# Patient Record
Sex: MALE | Race: WHITE | Employment: OTHER | URBAN - METROPOLITAN AREA
[De-identification: names, ages, dates, MRNs, and addresses within clinical notes are randomized per-mention and may not be internally consistent; named-entity substitution may affect disease eponyms.]

---

## 2020-05-29 LAB
EKG P AXIS: NORMAL DEG
EKG P-R INTERVAL: NORMAL MS
EKG QRS INTERVAL: 72 MS
EKG QTC INTERVAL: NORMAL MS
HEART RATE: 65 BPM

## 2021-07-06 LAB
EKG P AXIS: NORMAL DEG
EKG P-R INTERVAL: NORMAL MS
EKG QRS INTERVAL: 80 MS
EKG QTC INTERVAL: NORMAL MS
HEART RATE: 73 BPM

## 2021-09-22 NOTE — PROGRESS NOTES
[address] [facility]  [address] [place] [address]  Dept: [phone]  Loc: [phone]   Hematology/Oncology Consult (Clinic)        9/232/21     Michelle Logan   1940     Ricardo Coronado MD [facility]  Shay Bergeron DO PCP is in [facility] [place]. 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 Patient cancelled heme appt  due on 9/8 as he wanted all care closer to home. Otolaryngology care per Dr Ruthy Friend at [facility]. Patient had  Covid on 8/26, outpt mnt and recovered. April noted lump L neck under the jaw. Since core bx has been done, he states the mass is much smaller. Since April ,legs a bit weaker bilaterally but no pain except in knees. Overall describes a sense of wellbeing is stable and unchanged. Denies any drenching sweats fevers or weight loss. Denies any midline back pain or pain in his upper arms or legs. Denies any frequent or severe infections. He had Covid vaccination done in April. ROS:  Review of Systems 14 point negative except as detailed above. PMH:   Past Medical History:   Diagnosis Date    CAD (coronary artery disease)     Enlarged lymph nodes     Heart disease     Hyperlipidemia     Hypertension     Stents x 2 2004. No MI,angina ,chf  Denies history of diabetes or stroke.     Social HX:   Social History     Socioeconomic History    Marital status:      Spouse name: Not on file    Number of children: Not on file    Years of education: Not on file    Highest education level: Not on file   Occupational History    Not on file   Tobacco Use    Smoking status: Former Smoker     Packs/day: 1.00     Years: 37.00     Pack years: 37.00     Types: Never done    Flu vaccine (1) Never done   [employer] Visit (AWV)  Never done        Interests:   Not reviewed    Fam HX:   No Cancer hx    Hospitalizations:   None recent    Allergies:  No Known Allergies     Adult Illness: There is no problem list on file for this patient. see above    Surgery:  Past Surgical History:   Procedure Laterality Date    ABSCESS DRAINAGE      SPIDER BITE    CAROTID STENT PLACEMENT      COLONOSCOPY      HERNIA REPAIR  2016        Medications:  Current Outpatient Medications   Medication Sig Dispense Refill    OMEPRAZOLE PO Take by mouth      cetirizine (ZYRTEC) 10 MG tablet Take 10 mg by mouth daily      Multiple Vitamins-Minerals (THERAPEUTIC MULTIVITAMIN-MINERALS) tablet Take 1 tablet by mouth daily      pravastatin (PRAVACHOL) 80 MG tablet Take 80 mg by mouth daily.  metoprolol (LOPRESSOR) 25 MG tablet Take 25 mg by mouth 2 times daily.  lisinopril (PRINIVIL;ZESTRIL) 5 MG tablet Take 5 mg by mouth daily.  clopidogrel (PLAVIX) 75 MG tablet Take 75 mg by mouth daily.  aspirin 81 MG tablet Take 81 mg by mouth daily. No current facility-administered medications for this visit. EXAM:    BP (!) 169/76 (Site: Right Upper Arm, Position: Sitting, Cuff Size: Medium Adult)   Pulse 78   Temp 98.5 °F (36.9 °C) (Oral)   Resp 16   Ht 5' 10\" (1.778 m)   Wt 206 lb 8 oz (93.7 kg)   SpO2 95%   BMI 29.63 kg/m²      ECO  General: Non-ill appearing. Appears younger than his stated age. HEENT: NC/AT,nonicteric, perrla,eom intact, no mucosal lesions, mildly hard of hearing. Neck: normal thyroid, no masses. pulses nl, no bruits,   Nodes: Left neck at the site of the core biopsy of the left mid mandible shows a vague fullness persisting. Inferior to this is another area of vague fullness consistent with adenopathy. Also some prominence in the preauricular area. No other areas of adenopathy.   Lungs/chest: clear, no rales,rhonchi or wheezing, lung bases clear  CV: rrr, no rubs ,gallops or murmurs  Breasts: normal exam  Abd/Rectal: soft, non-tender,bowel sounds normal , no HSM,no masses  Back: normal curvature, No midline tenderness. flanks nontender  : Not Examined  Extremities: no cyanosis,clubbing or edema. Skin: unremarkable  Neuro: A and O x 4, CN exam nonfocal, Motor- no deficits, Sensory- no deficits, gait-nl, speech- fluent, no ataxia. Devices: none      DATA:    LAB:     None found. To be ordered today. IMAGING:  None thus far. PROCEDURES:  Lymph node bx [facility] 8/24/21  Case Report   Surgical Pathology Report                         Case: [id number]                                   Authorizing Provider: Connie Garcia MD              Collected:           08/24/2021 03:19 PM           Ordering Location:     [facility]               Received:            08/24/2021 04:27 PM           Pathologist:           Bishop Britton MD                                                             Specimens:   A) - SURG PATH, left neck level II LN for lymphoma work up                                          B) - SURG PATH, left neck level II LN                                                       Clinical History      Preop Diagnosis:  Please perform core biopsy of left level II lymph node - will need to send tissue for flow for workup for possible lymphoma. Associated Diagnosis:  Follow-up, Z09. Pathologic Diagnosis      A. Lymph node, left neck level II, biopsy:  · Plasma cell neoplasm, see comment.         Electronically signed by Bishop Britton MD on 8/27/2021 at 11:54 AM    Diagnosis Comments      The biopsy shows effaced lymph node with infiltrations of sheets of mature plasma cells admixed with small lymphocytes. Touch print shows similar morphology. The plasma cells are positive for TzOH52v,  and is Kappa+ light chain restricted (Luc Szymanski). They are negative for CD5, Cyclin D1, Cd20, Cd117, and Lambda-WOJCICEH. The background small lymphocytes are positive for T cells (30%), and a fewer B-cells (10%). The Ki67 show approx. 10-20% positivity and most are those reactive T-cells. The findings are consistent with Plasmacytoma. A differential diagnosis of lymphoplasmacytic lymphoma or marginal zone lymphoma is considered but given the background of few B-cells these findings are unlikely. Correlation with IgH and molecular study is recommended. Correlation with systemic examination and serum test is recommended to rule out multiple myeloma.      All controls show appropriate reactivity.     All immunohistochemistry, in situ hybridization, and histochemical tests were developed by and are performed at the [address] [place] [address], [address], [address] [place] [address]. All tests reported here, except those addressing HER2 overexpression as a predictive marker, have not been cleared by or approved by the [employer] and Drug Administration (FDA). The laboratory is regulated under CLIA as qualified to perform high-complexity testing. The tests are used for clinical purposes. They should not be regarded as investigational or for research.          Addendum      IHC for CD3 highlights T-cells similar to those of Cd5. MUM1 are positive on the plasma cells similar to those of Cd138. The Lambda-WOJCIECH show few plasma cells and K:L ratio>20:1. The findings confirm the final diagnosis.      All controls show appropriate reactivity.     All immunohistochemistry, in situ hybridization, and histochemical tests were developed by and are performed at the [address] [place] [address], [address]. All tests reported here, except those addressing HER2 overexpression as a predictive marker, have not been cleared by or approved by the [employer] and Drug Administration (FDA). The laboratory is regulated under CLIA as qualified to perform high-complexity testing.  The tests are used for clinical purposes. They should not be regarded as investigational or for research. Addendum electronically signed by Enrique Barlow MD on 8/31/2021 at  9:28 AM    Addendum 2      IgH PCR shows predominant monoclonal patterns, consistent with the presence of a clonalm B-cell or plasma cell population. The final diagnosis with differential diagnosis remains unchanged. Addendum electronically signed by Enrique Barlow MD on 9/2/2021 at  8:12 AM    Microscopic Description      A microscopic examination was performed. Synoptic report  Specimen Site: Lymph node, left neck level II  Procedure: Biopsies  Diagnosis:  Plasma cell neoplasm, see comment.     Microscopic description: The H&E section reveal a completely effaced lymphoid tissue with diffuse infiltrate with predominantly plasma cells admixed with small lymphoid cells. There are no large atypical lymphocytes present.      --Ancillary Studies--  Flow cytometry: Not performed  Molecular studies: Submitted for IgH PCR and result will be followed in the addendum. Special stains: Not performed  Immunohistochemistry (IHC)/in situ hybridization (WOJCIECH):     Neoplastic cells are positive for: CD19, , and Kappa WOJCIECH and plasma cell antigen (Vs38C). Neoplastic cells are negative for: CD5, CD10, CD20, PAX5, BCL1, Lambda WOJCIECH, and Ki67 (10% by manual quantification method)  Other IHC findings: see comment. IHC for Kappa and Lambda is unremarkable on both B and plasma cells. CD10 is virtually negative on the biopsy. CD20 and PAX5 show a few B-cell clusters (<10%) and they are negative for Cd5, Cd10, and BCL1.       HEME Mutation Panel  Inactivating and splice site TET2 mutations detected. No MYD88, CARD11, CXCR4, KLF2, NOTCH1, NOTCH2 or any other mutations associated wtih specific lymphoma types are noted.     en cleared by or approved by the [employer] and Drug Administration (FDA).  The laboratory is regulated under CLIA as qualified to perform high-complexity testing. The tests are used for clinical purposes. They should not be regarded as investigational or for research. Images          For Immediate Release to Patient's [employer]? Yes  NoAbnormal         [address]   Lab and Collection        PATHOLOGY:   Clinical History 7/22/21      Mass left parotid. *CYTOLOGIC DIAGNOSIS*      A. LEFT PAROTID MASS, FNA (CYTOLOGY AND CELL BLOCK):      FINAL DIAGNOSIS:  · Non Diagnostic  · Normal Salivary Gland Elements Only        FNA Performed By:  Clinician       Electronically signed by Rasta Dempsey MD on 7/23/2021 at  4:57 PM          GENETICS:  See above    MOLECULAR:  See above    ASSESSMENT/PLAN:    1: Diagnosis: 26-year-old gentleman with a left neck mass on core biopsy at [place] favors a plasmacytoma. Patient needs thorough evaluation to rule out myeloma. He is asymptomatic. 2) Prognosis / Disease Status: Excellent of a solitary plasmacytoma. Definition will require less than 10% plasma cells in the marrow. 3) Work-up:    Labs: Complete set of myeloma parameters and serum and urine today   Imaging: PET/CT   Procedures: Bone marrow aspiration biopsy for ruling out myeloma. Consults: Interventional radiology for bone marrow aspiration and biopsy   Other: Bone marrow aspiration biopsy for myeloma including flow cytometry and FISH multiple myeloma prognostic cytogenetics panel. 4) Symptom Management: None needed      5) Supportive care provided. Level of care is appropriate. Teaching done today. Reviewed the differential plasmacytoma. 6) Treatment goal: Cure      Treatment plan:      Pending completion of staging evaluation and diagnostic work-up. If he truly has a solitary plasmacytoma he will have a very high cure rate with radiation alone. 7) Medications reviewed.    Prescriptions today: None            No orders of the defined types were placed in this encounter. OARRS:  Controlled Substance Monitoring:    Acute and Chronic Pain Monitoring:   No flowsheet data found. 8) Research Options:   None at this time      9) Other:        None    10) Follow Up:  Return in about 3 weeks (around 10/14/2021). I spent 60 minutes face-to-face with the patient and family. Greater than 50% of time spent on counseling and coordinating the patient's care. Face-to-face counseling included prognosis, review of risks and benefits of any treatment along with compliance and risk reduction.     Hugo Aburto MD

## 2021-09-23 ENCOUNTER — OFFICE VISIT (OUTPATIENT)
Dept: ONCOLOGY | Age: 81
End: 2021-09-23
Payer: MEDICARE

## 2021-09-23 ENCOUNTER — HOSPITAL ENCOUNTER (OUTPATIENT)
Dept: INFUSION THERAPY | Age: 81
Discharge: HOME | End: 2021-09-23
Payer: MEDICARE

## 2021-09-23 ENCOUNTER — CLINICAL DOCUMENTATION (OUTPATIENT)
Dept: CASE MANAGEMENT | Age: 81
End: 2021-09-23

## 2021-09-23 VITALS
BODY MASS INDEX: 29.56 KG/M2 | DIASTOLIC BLOOD PRESSURE: 76 MMHG | WEIGHT: 206.5 LBS | RESPIRATION RATE: 16 BRPM | SYSTOLIC BLOOD PRESSURE: 169 MMHG | OXYGEN SATURATION: 95 % | HEART RATE: 78 BPM | HEIGHT: 70 IN | TEMPERATURE: 98.5 F

## 2021-09-23 DIAGNOSIS — C90.20 EXTRAMEDULLARY PLASMACYTOMA NOT HAVING ACHIEVED REMISSION (HCC): Primary | ICD-10-CM

## 2021-09-23 DIAGNOSIS — C90.20 EXTRAMEDULLARY PLASMACYTOMA NOT HAVING ACHIEVED REMISSION (HCC): ICD-10-CM

## 2021-09-23 LAB
ABSOLUTE IMMATURE GRANULOCYTE: 0.03 THOU/MM3 (ref 0–0.07)
ALBUMIN SERPL-MCNC: 4.2 G/DL (ref 3.5–5.1)
ALP BLD-CCNC: 100 U/L (ref 38–126)
ALT SERPL-CCNC: 17 U/L (ref 11–66)
ANION GAP SERPL CALCULATED.3IONS-SCNC: 15 MEQ/L (ref 8–16)
AST SERPL-CCNC: 19 U/L (ref 5–40)
BASINOPHIL, AUTOMATED: 0 % (ref 0–3)
BASOPHILS ABSOLUTE: 0 THOU/MM3 (ref 0–0.1)
BILIRUB SERPL-MCNC: 0.2 MG/DL (ref 0.3–1.2)
BUN BLDV-MCNC: 23 MG/DL (ref 7–22)
CALCIUM IONIZED: 1.28 MMOL/L (ref 1.12–1.32)
CALCIUM SERPL-MCNC: 10.1 MG/DL (ref 8.5–10.5)
CHLORIDE BLD-SCNC: 102 MEQ/L (ref 98–111)
CO2: 24 MEQ/L (ref 23–33)
CREAT SERPL-MCNC: 1.3 MG/DL (ref 0.4–1.2)
EOSINOPHILS ABSOLUTE: 0.2 THOU/MM3 (ref 0–0.4)
EOSINOPHILS RELATIVE PERCENT: 4 % (ref 0–4)
GFR SERPL CREATININE-BSD FRML MDRD: 53 ML/MIN/1.73M2
GLUCOSE BLD-MCNC: 118 MG/DL (ref 70–108)
HCT VFR BLD CALC: 37.9 % (ref 42–52)
HEMOGLOBIN: 12.3 GM/DL (ref 14–18)
IMMATURE GRANULOCYTES: 1 %
LD: 169 U/L (ref 100–190)
LYMPHOCYTES # BLD: 21 % (ref 15–47)
LYMPHOCYTES ABSOLUTE: 1.1 THOU/MM3 (ref 1–4.8)
MCH RBC QN AUTO: 30 PG (ref 26–33)
MCHC RBC AUTO-ENTMCNC: 32.5 GM/DL (ref 32.2–35.5)
MCV RBC AUTO: 92 FL (ref 80–94)
MONOCYTES ABSOLUTE: 0.6 THOU/MM3 (ref 0.4–1.3)
MONOCYTES: 11 % (ref 0–12)
PDW BLD-RTO: 14.1 % (ref 11.5–14.5)
PLATELET # BLD: 240 THOU/MM3 (ref 130–400)
PMV BLD AUTO: 10.1 FL (ref 9.4–12.4)
POTASSIUM SERPL-SCNC: 4.1 MEQ/L (ref 3.5–5.2)
RBC # BLD: 4.1 MILL/MM3 (ref 4.7–6.1)
SEG NEUTROPHILS: 64 % (ref 43–75)
SEGMENTED NEUTROPHILS ABSOLUTE COUNT: 3.5 THOU/MM3 (ref 1.8–7.7)
SODIUM BLD-SCNC: 141 MEQ/L (ref 135–145)
TOTAL PROTEIN: 8.5 G/DL (ref 6.1–8)
URIC ACID: 7.6 MG/DL (ref 3.7–7)
WBC # BLD: 5.4 THOU/MM3 (ref 4.8–10.8)

## 2021-09-23 PROCEDURE — 99205 OFFICE O/P NEW HI 60 MIN: CPT | Performed by: INTERNAL MEDICINE

## 2021-09-23 PROCEDURE — 84550 ASSAY OF BLOOD/URIC ACID: CPT

## 2021-09-23 PROCEDURE — 85025 COMPLETE CBC W/AUTO DIFF WBC: CPT

## 2021-09-23 PROCEDURE — 99211 OFF/OP EST MAY X REQ PHY/QHP: CPT

## 2021-09-23 PROCEDURE — 84156 ASSAY OF PROTEIN URINE: CPT

## 2021-09-23 PROCEDURE — 84155 ASSAY OF PROTEIN SERUM: CPT

## 2021-09-23 PROCEDURE — 4040F PNEUMOC VAC/ADMIN/RCVD: CPT | Performed by: INTERNAL MEDICINE

## 2021-09-23 PROCEDURE — 86335 IMMUNFIX E-PHORSIS/URINE/CSF: CPT

## 2021-09-23 PROCEDURE — 83883 ASSAY NEPHELOMETRY NOT SPEC: CPT

## 2021-09-23 PROCEDURE — 1123F ACP DISCUSS/DSCN MKR DOCD: CPT | Performed by: INTERNAL MEDICINE

## 2021-09-23 PROCEDURE — 82232 ASSAY OF BETA-2 PROTEIN: CPT

## 2021-09-23 PROCEDURE — 83615 LACTATE (LD) (LDH) ENZYME: CPT

## 2021-09-23 PROCEDURE — 82784 ASSAY IGA/IGD/IGG/IGM EACH: CPT

## 2021-09-23 PROCEDURE — 84165 PROTEIN E-PHORESIS SERUM: CPT

## 2021-09-23 PROCEDURE — 82330 ASSAY OF CALCIUM: CPT

## 2021-09-23 PROCEDURE — 36415 COLL VENOUS BLD VENIPUNCTURE: CPT

## 2021-09-23 PROCEDURE — 80053 COMPREHEN METABOLIC PANEL: CPT

## 2021-09-23 PROCEDURE — G8427 DOCREV CUR MEDS BY ELIG CLIN: HCPCS | Performed by: INTERNAL MEDICINE

## 2021-09-23 PROCEDURE — 1036F TOBACCO NON-USER: CPT | Performed by: INTERNAL MEDICINE

## 2021-09-23 PROCEDURE — G8417 CALC BMI ABV UP PARAM F/U: HCPCS | Performed by: INTERNAL MEDICINE

## 2021-09-23 PROCEDURE — 86334 IMMUNOFIX E-PHORESIS SERUM: CPT

## 2021-09-23 RX ORDER — CETIRIZINE HYDROCHLORIDE 10 MG/1
10 TABLET ORAL DAILY
COMMUNITY

## 2021-09-23 RX ORDER — M-VIT,TX,IRON,MINS/CALC/FOLIC 27MG-0.4MG
1 TABLET ORAL DAILY
COMMUNITY

## 2021-09-24 LAB
IGA: 259 MG/DL (ref 70–400)
IGG: 1961 MG/DL (ref 700–1600)
IGM: 93 MG/DL (ref 40–230)

## 2021-09-26 LAB
KAPPA/LAMBDA FREE LIGHT CHAINS: NORMAL
PROTEIN ELECTROPHORESIS, URINE: NORMAL

## 2021-09-27 LAB
BETA-2 MICROGLOBULIN: 3.1 MG/L (ref 0.6–2.4)
IMMUNOFIXATION RESULT, SERUM: NORMAL

## 2021-09-29 ENCOUNTER — HOSPITAL ENCOUNTER (OUTPATIENT)
Dept: CT IMAGING | Age: 81
Discharge: HOME | End: 2021-09-29
Payer: MEDICARE

## 2021-09-29 VITALS
HEART RATE: 82 BPM | DIASTOLIC BLOOD PRESSURE: 60 MMHG | SYSTOLIC BLOOD PRESSURE: 124 MMHG | HEIGHT: 70 IN | RESPIRATION RATE: 118 BRPM | TEMPERATURE: 97.5 F | BODY MASS INDEX: 28.6 KG/M2 | WEIGHT: 199.8 LBS | OXYGEN SATURATION: 96 %

## 2021-09-29 DIAGNOSIS — C90.20 EXTRAMEDULLARY PLASMACYTOMA NOT HAVING ACHIEVED REMISSION (HCC): ICD-10-CM

## 2021-09-29 DIAGNOSIS — C90.30 PLASMACYTOMA, UNSPECIFIED PLASMACYTOMA TYPE, UNSPECIFIED WHETHER REMISSION ACHIEVED (HCC): ICD-10-CM

## 2021-09-29 LAB
CREAT SERPL-MCNC: 1.4 MG/DL (ref 0.4–1.2)
ERYTHROCYTE [DISTWIDTH] IN BLOOD BY AUTOMATED COUNT: 14.6 % (ref 11.5–14.5)
ERYTHROCYTE [DISTWIDTH] IN BLOOD BY AUTOMATED COUNT: 50.6 FL (ref 35–45)
GFR SERPL CREATININE-BSD FRML MDRD: 49 ML/MIN/1.73M2
HCT VFR BLD CALC: 41.3 % (ref 42–52)
HEMOGLOBIN: 12.9 GM/DL (ref 14–18)
MCH RBC QN AUTO: 29.6 PG (ref 26–33)
MCHC RBC AUTO-ENTMCNC: 31.2 GM/DL (ref 32.2–35.5)
MCV RBC AUTO: 94.7 FL (ref 80–94)
PLATELET # BLD: 217 THOU/MM3 (ref 130–400)
PMV BLD AUTO: 10.7 FL (ref 9.4–12.4)
PROTEIN ELECTROPHORESIS, SERUM: NORMAL
RBC # BLD: 4.36 MILL/MM3 (ref 4.7–6.1)
WBC # BLD: 7.4 THOU/MM3 (ref 4.8–10.8)

## 2021-09-29 PROCEDURE — 2580000003 HC RX 258: Performed by: RADIOLOGY

## 2021-09-29 PROCEDURE — 38221 DX BONE MARROW BIOPSIES: CPT

## 2021-09-29 PROCEDURE — 85027 COMPLETE CBC AUTOMATED: CPT

## 2021-09-29 PROCEDURE — 88185 FLOWCYTOMETRY/TC ADD-ON: CPT

## 2021-09-29 PROCEDURE — 36415 COLL VENOUS BLD VENIPUNCTURE: CPT

## 2021-09-29 PROCEDURE — 82565 ASSAY OF CREATININE: CPT

## 2021-09-29 PROCEDURE — 88305 TISSUE EXAM BY PATHOLOGIST: CPT

## 2021-09-29 PROCEDURE — 77012 CT SCAN FOR NEEDLE BIOPSY: CPT

## 2021-09-29 PROCEDURE — 88313 SPECIAL STAINS GROUP 2: CPT

## 2021-09-29 PROCEDURE — 6370000000 HC RX 637 (ALT 250 FOR IP): Performed by: RADIOLOGY

## 2021-09-29 PROCEDURE — 88237 TISSUE CULTURE BONE MARROW: CPT

## 2021-09-29 PROCEDURE — 88184 FLOWCYTOMETRY/ TC 1 MARKER: CPT

## 2021-09-29 PROCEDURE — 6360000002 HC RX W HCPCS: Performed by: RADIOLOGY

## 2021-09-29 RX ORDER — SODIUM CHLORIDE 450 MG/100ML
INJECTION, SOLUTION INTRAVENOUS CONTINUOUS
Status: DISCONTINUED | OUTPATIENT
Start: 2021-09-29 | End: 2021-09-30 | Stop reason: HOSPADM

## 2021-09-29 RX ORDER — BACITRACIN, NEOMYCIN, POLYMYXIN B 400; 3.5; 5 [USP'U]/G; MG/G; [USP'U]/G
OINTMENT TOPICAL ONCE
Status: COMPLETED | OUTPATIENT
Start: 2021-09-29 | End: 2021-09-29

## 2021-09-29 RX ORDER — MIDAZOLAM HYDROCHLORIDE 1 MG/ML
1 INJECTION INTRAMUSCULAR; INTRAVENOUS ONCE
Status: COMPLETED | OUTPATIENT
Start: 2021-09-29 | End: 2021-09-29

## 2021-09-29 RX ORDER — FENTANYL CITRATE 50 UG/ML
50 INJECTION, SOLUTION INTRAMUSCULAR; INTRAVENOUS ONCE
Status: COMPLETED | OUTPATIENT
Start: 2021-09-29 | End: 2021-09-29

## 2021-09-29 RX ADMIN — MIDAZOLAM 1 MG: 1 INJECTION INTRAMUSCULAR; INTRAVENOUS at 11:22

## 2021-09-29 RX ADMIN — FENTANYL CITRATE 50 MCG: 0.05 INJECTION, SOLUTION INTRAMUSCULAR; INTRAVENOUS at 11:22

## 2021-09-29 RX ADMIN — BACITRACIN, NEOMYCIN, POLYMYXIN B 1 G: 400; 3.5; 5 OINTMENT TOPICAL at 11:36

## 2021-09-29 RX ADMIN — SODIUM CHLORIDE: 4.5 INJECTION, SOLUTION INTRAVENOUS at 10:00

## 2021-09-29 ASSESSMENT — PAIN SCALES - GENERAL
PAINLEVEL_OUTOF10: 0
PAINLEVEL_OUTOF10: 0

## 2021-09-29 NOTE — H&P
Formulation and discussion of sedation / procedure plans, risks, benefits, side effects and alternatives with patient and/or responsible adult completed.     Electronically signed by Hunter Arellano DO on 9/29/2021 at 11:07 AM

## 2021-09-29 NOTE — PROGRESS NOTES
1150: Patient back from procedure, patient tolerated well. No pain noted. Vitals as charted. Bandaid clean, dry, intact. Provided with snack and beverage. 1215: Vitals as charted. Bandaid clean, dry, intact. 1230: Vitals as charted. Bandaid clean, dry, intact. 1245: Vitals as charted. Bandaid clean, dry, intact. 1300: Vitals as charted. Bandaid clean, dry, intact. 1330: Vitals as charted. Bandaid clean, dry, intact. 1340: Patient discharged in wheelchair.

## 2021-09-29 NOTE — PROGRESS NOTES
Department of Radiology  Post Procedure Progress Note      Pre-Procedure Diagnosis:  suspected multiple myeloma    Procedure Performed:  CT guided bone marrow biopsy    Anesthesia: local / versed and fentanyl    Findings: successful    Immediate Complications:  None    Estimated Blood Loss: minimal    SEE DICTATED PROCEDURE NOTE FOR COMPLETE DETAILS.     Elvira Hough DO DO, MD  9/29/2021 11:46 AM

## 2021-09-29 NOTE — PROGRESS NOTES
0915: Patient arrived ambulatory for bone marrow biopsy. Patient states he has been NPO since last night. Patient has been off aspirin for 5 days. Patient's wife and daughter here. PT RIGHTS AND RESPONSIBILITIES OFFERED TO PT. Blood work collected and sent to lab. IV fluid infusing. AVS reviewed with patient, voiced understanding.                      _m___ Safety:       (Environmental)   [place] to environment   Ensure ID band is correct and in place/ allergy band as needed   Assess for fall risk   Initiate fall precautions as applicable (fall band, side rails, etc.)   Call light within reach   Bed in low position/ wheels locked    _m___ Pain:        Assess pain level and characteristics   Administer analgesics as ordered   Assess effectiveness of pain management and report to MD as needed    _m___ Knowledge Deficit:   Assess baseline knowledge   Provide teaching at level of understanding   Provide teaching via preferred learning method   Evaluate teaching effectiveness    _m___ Hemodynamic/Respiratory Status:       (Pre and Post Procedure Monitoring)   Assess/Monitor vital signs and LOC   Assess Baseline SpO2 prior to any sedation   Obtain weight/height   Assess vital signs/ LOC until patient meets discharge criteria   Monitor procedure site and notify MD of any issues

## 2021-09-29 NOTE — PROGRESS NOTES
1055 Pt arrived to CT holding. Skin natural for race, warm, dry. Respirations even and unlabored. Denies c/o pain at this time  1058 Dr. Yuvonne Castleman in to evaluate pt and explain procedure. Consent obtained. 1108 Pt positioned prone on CT table. Monitor applied. 1110 Pre scans complete. 9 [facility] present in [facility] Procedure started with Dr. Yuvonne Castleman  [phone] Procedure complete. Bandaid to lower back puncture site. 1139 Post scans complete. 1148 Pt transported to [facility] via cart. Report called to Dilip Cummings RN. Skin natural for race, warm, dry. Respirations even and unlabored. Pt denies c/o pain at this time.

## 2021-09-29 NOTE — H&P
[address] [address]  Sedation/Analgesia History & Physical    Pt Name: Gil Pool  MRN: [id number]  YOB: 1940  Provider Performing Procedure: Remberto Frank DO, MD  Primary Care Physician: Greta Saab DO    PRE-PROCEDURE   DNR-CCA/DNR-CC []Yes [x]No  Brief History/Pre-Procedure Diagnosis: suspected multiple myeloma          MEDICAL HISTORY  [x]CAD/Valve  []Liver Disease  []Lung Disease []Diabetes  [x]Hypertension []Renal Disease  [x]Additional information:       has a past medical history of CAD (coronary artery disease), Enlarged lymph nodes, Heart disease, Hyperlipidemia, and Hypertension. SURGICAL HISTORY   has a past surgical history that includes Abscess Drainage; Colonoscopy; hernia repair (05/18/2016); and Carotid stent placement (2004). Additional information:       ALLERGIES   Allergies as of 09/29/2021    (No Known Allergies)     Additional information:       MEDICATIONS   Coumadin Use Last 5 Days [x]No []Yes  Antiplatelet drug therapy use last 5 days  [x]No []Yes  Other anticoagulant use last 5 days  [x]No []Yes    Current Outpatient Medications:     OMEPRAZOLE PO, Take by mouth, Disp: , Rfl:     cetirizine (ZYRTEC) 10 MG tablet, Take 10 mg by mouth daily, Disp: , Rfl:     Multiple Vitamins-Minerals (THERAPEUTIC MULTIVITAMIN-MINERALS) tablet, Take 1 tablet by mouth daily, Disp: , Rfl:     pravastatin (PRAVACHOL) 80 MG tablet, Take 80 mg by mouth daily. , Disp: , Rfl:     metoprolol (LOPRESSOR) 25 MG tablet, Take 25 mg by mouth 2 times daily. , Disp: , Rfl:     lisinopril (PRINIVIL;ZESTRIL) 5 MG tablet, Take 5 mg by mouth daily. , Disp: , Rfl:     clopidogrel (PLAVIX) 75 MG tablet, Take 75 mg by mouth daily. , Disp: , Rfl:     aspirin 81 MG tablet, Take 81 mg by mouth daily. , Disp: , Rfl:     Current Facility-Administered Medications:     0.45 % sodium chloride infusion, , IntraVENous, Continuous, Remberto Frank DO, Last Rate: 20 mL/hr at 09/29/21 1000, New Bag at 09/29/21 1000    fentaNYL (SUBLIMAZE) injection 50 mcg, 50 mcg, IntraVENous, Once, Hal Rajan DO    midazolam (VERSED) injection 1 mg, 1 mg, IntraVENous, Once, Hal Rajan DO  Prior to Admission medications    Medication Sig Start Date End Date Taking? Authorizing Provider   OMEPRAZOLE PO Take by mouth    Historical Provider, MD   cetirizine (ZYRTEC) 10 MG tablet Take 10 mg by mouth daily    Historical Provider, MD   Multiple Vitamins-Minerals (THERAPEUTIC MULTIVITAMIN-MINERALS) tablet Take 1 tablet by mouth daily    Historical Provider, MD   pravastatin (PRAVACHOL) 80 MG tablet Take 80 mg by mouth daily. Historical Provider, MD   metoprolol (LOPRESSOR) 25 MG tablet Take 25 mg by mouth 2 times daily. Historical Provider, MD   lisinopril (PRINIVIL;ZESTRIL) 5 MG tablet Take 5 mg by mouth daily. Historical Provider, MD   clopidogrel (PLAVIX) 75 MG tablet Take 75 mg by mouth daily. Historical Provider, MD   aspirin 81 MG tablet Take 81 mg by mouth daily.     Historical Provider, MD     Additional information:       VITAL SIGNS   Vitals:    09/29/21 0915   BP: (!) 171/80   Pulse: 85   Resp: 16   Temp: 97.5 °F (36.4 °C)   SpO2: 97%       PHYSICAL:   Heart:  [x]Regular rate and rhythm  []Other:    Lungs:  [x]Clear    []Other:    Abdomen: [x]Soft    []Other:    Mental Status: [x]Alert & Oriented  []Other:      PLANNED PROCEDURE   [x]Biospy []Arteriogram              []Drainage   []Mediport Insertion  []Fistulogram []IV access       []Vertebroplasty / Augmentation  []IVC filter []Dialysis catheter []Biliary drainage  []Other: []CAPD Catheter []Nephrostomy Tube / Stent  SEDATION  Planned agent:[x]Midazolam []Meperidine [x]Sublimaze []Dilaudid []Morphine     []Diazepam  []Other:     ASA Classification:  []1 [x]2 []3 []4 []5  Class 1: A normal healthy patient  Class 2: Pt with mild to moderate systemic disease  Class 3: Severe systemic disease or disturbance  Class 4: Severe systemic disorders that are already life threatening. Class 5: Moribund pt with little chances of survival, for more than 24 hours. Mallampati I Airway Classification:   []1 []2 [x]3 []4    [x]Pre-procedure diagnostic studies complete and results available. Comment:    [x]Previous sedation/anesthesia experiences assessed. Comment:    [x]The patient is an appropriate candidate to undergo the planned procedure sedation and anesthesia. (Refer to nursing sedation/analgesia documentation record)  [x]Formulation and discussion of sedation/procedure plan, risks, and expectations with patient and/or responsible adult completed. [x]Patient examined immediately prior to the procedure.  (Refer to nursing sedation/analgesia documentation record)    Rachel Pallas, DO, DO, MD  Electronically signed 9/29/2021 at 11:05 AM

## 2021-09-30 NOTE — PROGRESS NOTES
0 Spoke to pt's wife for follow-up bone marrow biopsy-states \"He is doing real well. \" Denies any problems at biopsy site. Offers no complaints.

## 2021-10-02 LAB — LEUKEMIA/LYMPHOMA PHENO BONE MARROW: NORMAL

## 2021-10-07 ENCOUNTER — HOSPITAL ENCOUNTER (OUTPATIENT)
Dept: PET IMAGING | Age: 81
Discharge: HOME | End: 2021-10-07
Payer: MEDICARE

## 2021-10-07 DIAGNOSIS — C90.20 EXTRAMEDULLARY PLASMACYTOMA NOT HAVING ACHIEVED REMISSION (HCC): ICD-10-CM

## 2021-10-07 PROCEDURE — 3430000000 HC RX DIAGNOSTIC RADIOPHARMACEUTICAL: Performed by: INTERNAL MEDICINE

## 2021-10-07 PROCEDURE — A9552 F18 FDG: HCPCS | Performed by: INTERNAL MEDICINE

## 2021-10-07 PROCEDURE — 78815 PET IMAGE W/CT SKULL-THIGH: CPT

## 2021-10-07 RX ORDER — FLUDEOXYGLUCOSE F 18 200 MCI/ML
15.6 INJECTION, SOLUTION INTRAVENOUS
Status: COMPLETED | OUTPATIENT
Start: 2021-10-07 | End: 2021-10-07

## 2021-10-07 RX ADMIN — FLUDEOXYGLUCOSE F 18 15.6 MILLICURIE: 200 INJECTION, SOLUTION INTRAVENOUS at 08:51

## 2021-10-13 ENCOUNTER — OFFICE VISIT (OUTPATIENT)
Dept: ONCOLOGY | Age: 81
End: 2021-10-13
Payer: MEDICARE

## 2021-10-13 ENCOUNTER — HOSPITAL ENCOUNTER (OUTPATIENT)
Dept: INFUSION THERAPY | Age: 81
Discharge: HOME | End: 2021-10-13
Payer: MEDICARE

## 2021-10-13 VITALS
TEMPERATURE: 98.4 F | DIASTOLIC BLOOD PRESSURE: 73 MMHG | OXYGEN SATURATION: 96 % | WEIGHT: 205 LBS | HEIGHT: 70 IN | SYSTOLIC BLOOD PRESSURE: 159 MMHG | BODY MASS INDEX: 29.35 KG/M2 | RESPIRATION RATE: 16 BRPM | HEART RATE: 71 BPM

## 2021-10-13 DIAGNOSIS — R59.0 MEDIASTINAL LYMPHADENOPATHY: ICD-10-CM

## 2021-10-13 DIAGNOSIS — C90.20 EXTRAMEDULLARY PLASMACYTOMA NOT HAVING ACHIEVED REMISSION (HCC): Primary | ICD-10-CM

## 2021-10-13 PROCEDURE — 99211 OFF/OP EST MAY X REQ PHY/QHP: CPT

## 2021-10-13 PROCEDURE — 1123F ACP DISCUSS/DSCN MKR DOCD: CPT | Performed by: INTERNAL MEDICINE

## 2021-10-13 PROCEDURE — 1036F TOBACCO NON-USER: CPT | Performed by: INTERNAL MEDICINE

## 2021-10-13 PROCEDURE — G8417 CALC BMI ABV UP PARAM F/U: HCPCS | Performed by: INTERNAL MEDICINE

## 2021-10-13 PROCEDURE — G8484 FLU IMMUNIZE NO ADMIN: HCPCS | Performed by: INTERNAL MEDICINE

## 2021-10-13 PROCEDURE — 4040F PNEUMOC VAC/ADMIN/RCVD: CPT | Performed by: INTERNAL MEDICINE

## 2021-10-13 PROCEDURE — 99214 OFFICE O/P EST MOD 30 MIN: CPT | Performed by: INTERNAL MEDICINE

## 2021-10-13 PROCEDURE — G8427 DOCREV CUR MEDS BY ELIG CLIN: HCPCS | Performed by: INTERNAL MEDICINE

## 2021-10-13 NOTE — PROGRESS NOTES
[address] CANCER [facility] [address] [facility]  Dept: [phone]  Loc: [phone]   Hematology/Oncology Consult (Clinic)        10/13/2021    Shellye Pages   1940     No ref. provider found [facility]  Shay Bergeron DO PCP is in [place]. 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 Bone marrow biopsy-uninvolved. IgA 259/normal [id number]/minimally elevated, IgM 93/normal  Beta-2 microglobulin 3.1/mildly elevated  Serum immunofixation shows normal pattern with no monoclonal proteins. Serum free kappa light chains 84.82, lambda 37.03, ratio elevated at 2.29 (0.26-1.65)    -Abnormal PET scan (10/7/2021):  with multiple FDG avid mediastinal nodes. Representative subcarinal node 1.9 cm short axis with SUV of 4.9. Nonenlarged left hilar node with SUV of 3.2. Left inguinal node measuring 1 cm short axis with SUV of 4.6. Not palpable on exam.  Indeterminate 9 mm left apical lung nodule. No bone lesions.     -Indeterminate 9 mm left apex lung nodule mildly FDG avid with SUV of 1.8. TREATMENT:  -If solitary plasmacytoma confirmed he will receive radiation to that site.  -Need [facility] EBUS for histology of the mediastinal nodes. Subcarinal node likely the best target. Depending on these results he may need thoracic surgery for lymph node biopsy. PARAMETERS:  -PET/CT 10/7/2021  -Left neck exam; node excised. -Myeloma parameters ; serum light chains. SUBJECTIVE: Patient is asymptomatic and feels great. Here for results of his recent bone marrow biopsy and PET scan.     HPI: 79 yo male with L neck mass with recent [facility] bx reveaaling a plasma cell neoplasm/ possible plasmacytoma. Patient cancelled heme appt  due on  as he wanted all care closer to home. Otolaryngology care per Dr Hughes Patient at [facility]. Patient had  Covid on , outpt mngmt and recovered. April noted lump L neck under the jaw. Since core bx has been done, he states the mass is much smaller. Since April ,legs a bit weaker bilaterally but no pain except in knees. Overall describes a sense of wellbeing is stable and unchanged. Denies any drenching sweats fevers or weight loss. Denies any midline back pain or pain in his upper arms or legs. Denies any frequent or severe infections. He had Covid vaccination done in April. ROS:  Review of Systems 14 point negative except as detailed above. PMH:   Past Medical History:   Diagnosis Date    CAD (coronary artery disease)     Enlarged lymph nodes     Heart disease     Hyperlipidemia     Hypertension     Stents x 2 . No MI,angina ,chf  Denies history of diabetes or stroke. Social HX:   Social History     Socioeconomic History    Marital status:      Spouse name: Not on file    Number of children: Not on file    Years of education: Not on file    Highest education level: Not on file   Occupational History    Not on file   Tobacco Use    Smoking status: Former Smoker     Packs/day: 1.00     Years: 37.00     Pack years: 37.00     Types: Cigarettes     Start date: 1964     Quit date: 2001     Years since quittin.0    Smokeless tobacco: Never Used   Substance and Sexual Activity    Alcohol use: Yes    Drug use: Never    Sexual activity: Not on file   Other Topics Concern    Not on file   Social History Narrative    Not on file     Social Determinants of Health     Financial Resource Strain:     Difficulty of Paying Living Expenses:    Food Insecurity:     Worried About Running Out of Food in the Last Year:     [address] in the Last Year:    Transportation Needs:     Lack of Transportation (Medical):      Lack of Transportation (Non-Medical):    Physical Activity:     Days of Exercise per Week:     Minutes of Exercise per Session:    Stress:     Feeling of Stress :    Social Connections:     Frequency of Communication with Friends and Family:     Frequency of Social Gatherings with Friends and Family:     Attends Baptism Services:     Active Member of Clubs or Organizations:     Attends Club or Organization Meetings:     Marital Status:    Intimate Partner Violence:     Fear of Current or Ex-Partner:     Emotionally Abused:     Physically Abused:     Sexually Abused:       [address] [facility] [address]. 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 Tahmina Estrada    Phone: [phone] [facility] [id number]     Employment:  Retired [employer]    Immunizations: There is no immunization history on file for this patient. Covid and pneumonia vaccine    Health Screenings:    C-Scope: 15 yrs  Prostate: ok          Health Maintenance Due   Topic Date Due    Hepatitis C screen  Never done    Lipid screen  Never done    COVID-19 Vaccine (1) Never done    DTaP/Tdap/Td vaccine (1 - Tdap) Never done    Shingles Vaccine (1 of 2) Never done    Pneumococcal 65+ yrs at Risk Vaccine (1 of 2 - PCV13) Never done    Flu vaccine (1) Never done   [employer] Visit (AWV)  Never done        Interests:   Not reviewed    Fam HX:   No Cancer hx    Hospitalizations:   None recent    Allergies:  No Known Allergies     Adult Illness: There is no problem list on file for this patient.    see above    Surgery:  Past Surgical History:   Procedure Laterality Date    ABSCESS DRAINAGE      SPIDER BITE    CAROTID STENT PLACEMENT  2004    COLONOSCOPY      CT BONE MARROW BIOPSY  9/29/2021    CT BONE MARROW BIOPSY 9/29/2021 [facility] CT SCAN    HERNIA REPAIR  05/18/2016        Medications:  Current Outpatient Medications   Medication Sig Dispense Refill    OMEPRAZOLE PO Take by mouth  cetirizine (ZYRTEC) 10 MG tablet Take 10 mg by mouth daily      Multiple Vitamins-Minerals (THERAPEUTIC MULTIVITAMIN-MINERALS) tablet Take 1 tablet by mouth daily      pravastatin (PRAVACHOL) 80 MG tablet Take 80 mg by mouth daily.  metoprolol (LOPRESSOR) 25 MG tablet Take 25 mg by mouth 2 times daily.  lisinopril (PRINIVIL;ZESTRIL) 5 MG tablet Take 5 mg by mouth daily.  clopidogrel (PLAVIX) 75 MG tablet Take 75 mg by mouth daily.  aspirin 81 MG tablet Take 81 mg by mouth daily. No current facility-administered medications for this visit. EXAM:    BP (!) 159/73 (Site: Left Upper Arm, Position: Sitting, Cuff Size: Medium Adult)   Pulse 71   Temp 98.4 °F (36.9 °C) (Oral)   Resp 16   Ht 5' 10\" (1.778 m)   Wt 205 lb (93 kg)   SpO2 96%   BMI 29.41 kg/m²      ECO  General: Non-ill appearing. Appears younger than his stated age. HEENT: NC/AT,nonicteric, perrla,eom intact, no mucosal lesions, mildly hard of hearing. Neck: normal thyroid, no masses. pulses nl, no bruits,   Nodes: Left neck at the site of the core biopsy of the left mid mandible shows a vague fullness persisting. Inferior to this is another area of vague fullness consistent with adenopathy. Also some prominence in the preauricular area. No other areas of adenopathy. Lungs/chest: clear, no rales,rhonchi or wheezing, lung bases clear  CV: rrr, no rubs ,gallops or murmurs  Breasts: normal exam  Abd/Rectal: soft, non-tender,bowel sounds normal , no HSM,no masses  Back: normal curvature, No midline tenderness. flanks nontender  : Not Examined  Extremities: no cyanosis,clubbing or edema. Skin: unremarkable  Neuro: A and O x 4, CN exam nonfocal, Motor- no deficits, Sensory- no deficits, gait-nl, speech- fluent, no ataxia. Devices: none      DATA:    LAB:     None found. To be ordered today.         IMAGING:  PROCEDURE: PET CT SKULL BASE TO MID THIGH    10/7/21       CLINICAL INFORMATION: 51-year-old man with extra medullary plasmacytoma in the left neck; initial treatment strategy.       Radiopharmaceutical: 15.6 mCi F-18 FDG, intravenously.       TECHNIQUE: PET/CT imaging was performed from the skull base to the midthigh levels using routine PET acquisition.       Injection site: Left antecubital fossa.       Time of FDG injection: 8:51 AM.       Serum glucose: 110 mg/dL at 8:49 AM.       Time of imagin:58 AM.       COMPARISON: None       FINDINGS:        Neck: Confusion confluent left-sided level 2 cervical lymph nodes measure 1.2 cm in short axis diameter and are associated with a maximum SUV of 2.3 (image 50).     Chest: Cardiac size is normal. Atherosclerotic calcifications are present in the thoracic aorta and coronary arteries. 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 There is no pleural or    pericardial effusion. Calcified granulomas are present in the bilateral lungs. There is scarring at the bilateral lung apices. An indistinct 0.9 cm density in the left lung apex has a maximum SUV of 1.8 (image 94). Minimal alveolar opacities at the    bilateral lung bases are likely infectious or inflammatory. There are multiple FDG avid mediastinal lymph nodes. A representative subcarinal lymph node measures 1.9 cm in short axis diameter and has a maximum SUV of 4.9 (image 115). A nonenlarged left    hilar lymph node has a maximum SUV of 3.2 (image 108). There is no FDG avid axillary lymphadenopathy. Activity associated with a small hiatal hernia may be infectious or inflammatory. Degenerative changes are present in the thoracic spine without    evidence of hypermetabolic osseous metastatic disease. Periarticular activity at the bilateral shoulders is likely degenerative.       Abdomen/pelvis: Physiologic activity is present in the liver, spleen, urinary collecting system and gastrointestinal tract.  There is a calcification in the lumen of the gallbladder. There is mild fatty replacement of the pancreas. Atherosclerotic    calcific lesions are present in the abdominal aorta without evidence of aneurysm. There are diverticula in sigmoid colon without evidence of diverticulitis. The prostate gland is enlarged and contains calcifications. There is no FDG avid mesenteric,    retroperitoneal or pelvic lymphadenopathy. A left inguinal lymph node which measures 1.0 cm in short axis diameter has a maximum SUV of 4.6 (image 268). Degenerative changes are seen in the lumbar spine and pelvis without evidence of hypermetabolic    osseous metastatic disease.           Impression   1. Confluent mildly FDG avid left cervical lymphadenopathy which likely corresponds to known malignancy. 2. FDG avid mediastinal, left hilar and left inguinal lymphadenopathy suspicious for metastatic disease. 3. Small, mildly FDG avid density at the left lung apex of indeterminant etiology.  Malignancy cannot be excluded.       Final report electronically signed by Dr. 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 THERAPY HYPERTHYROID SUBSEQUENT [address] (STR)  Imaging result study Professional   Order Report     Order Details        PROCEDURES:  Lymph node bx [facility] 8/24/21  Case Report   Surgical Pathology Report                         Case: [id number]                                   Authorizing Provider: Tunde Guzman MD              Collected:           08/24/2021 03:19 PM           Ordering Location:     [facility]               Received:            08/24/2021 04:27 PM           Pathologist:           Anneliese Alejandra MD                                                             Specimens:   A) - SURG PATH, left neck level II LN for lymphoma work up                                          B) - SURG PATH, left neck level II LN                                                       Clinical History      Preop Diagnosis:  Please perform core biopsy of left level II lymph node - will need to send tissue for flow for workup for possible lymphoma. Associated Diagnosis:  Follow-up, Z09. Pathologic Diagnosis      A. Lymph node, left neck level II, biopsy:  · Plasma cell neoplasm, see comment.         Electronically signed by Anneliese Alejandra MD on 8/27/2021 at 11:54 AM    Diagnosis Comments      The biopsy shows effaced lymph node with infiltrations of sheets of mature plasma cells admixed with small lymphocytes. Touch print shows similar morphology. The plasma cells are positive for QaWL28w,  and is Kappa+ light chain restricted (Lucendia Ditch). They are negative for CD5, Cyclin D1, Cd20, Cd117, and Lambda-WOJCIECH. The background small lymphocytes are positive for T cells (30%), and a fewer B-cells (10%). The Ki67 show approx. 10-20% positivity and most are those reactive T-cells. The findings are consistent with Plasmacytoma. A differential diagnosis of lymphoplasmacytic lymphoma or marginal zone lymphoma is considered but given the background of few B-cells these findings are unlikely. Correlation with IgH and molecular study is recommended.   Correlation with systemic examination and serum test is recommended to rule out multiple myeloma.      All controls show appropriate reactivity.     All immunohistochemistry, in situ hybridization, and histochemical tests were developed by and are performed at the [facility], [facility] [address]. All tests reported here, except those addressing HER2 overexpression as a predictive marker, have not been cleared by or approved by the [employer] and Drug Administration (FDA). The laboratory is regulated under CLIA as qualified to perform high-complexity testing. The tests are used for clinical purposes. They should not be regarded as investigational or for research.          Addendum      IHC for CD3 highlights T-cells similar to those of Cd5. MUM1 are positive on the plasma cells similar to those of Cd138. The Lambda-WOJCIECH show few plasma cells and K:L ratio>20:1. The findings confirm the final diagnosis.      All controls show appropriate reactivity.     All immunohistochemistry, in situ hybridization, and histochemical tests were developed by and are performed at the [address] [place] [address], [address]. All tests reported here, except those addressing HER2 overexpression as a predictive marker, have not been cleared by or approved by the [employer] and Drug Administration (FDA). The laboratory is regulated under CLIA as qualified to perform high-complexity testing. The tests are used for clinical purposes. They should not be regarded as investigational or for research. Addendum electronically signed by Sergio Nava MD on 8/31/2021 at  9:28 AM    Addendum 2      IgH PCR shows predominant monoclonal patterns, consistent with the presence of a clonalm B-cell or plasma cell population. The final diagnosis with differential diagnosis remains unchanged. Addendum electronically signed by Sergio Nava MD on 9/2/2021 at  8:12 AM    Microscopic Description      A microscopic examination was performed.    Synoptic report  Specimen Site: Lymph node, left neck level II  Procedure: Biopsies  Diagnosis:  Plasma cell neoplasm, see comment.     Microscopic description: The H&E section reveal a completely effaced lymphoid tissue with diffuse infiltrate with predominantly plasma cells admixed with small lymphoid cells. There are no large atypical lymphocytes present.      --Ancillary Studies--  Flow cytometry: Not performed  Molecular studies: Submitted for IgH PCR and result will be followed in the addendum. Special stains: Not performed  Immunohistochemistry (IHC)/in situ hybridization (WOJCIECH):     Neoplastic cells are positive for: CD19, , and Kappa WOJCIECH and plasma cell antigen (Vs38C). Neoplastic cells are negative for: CD5, CD10, CD20, PAX5, BCL1, Lambda WOJCIECH, and Ki67 (10% by manual quantification method)  Other IHC findings: see comment. IHC for Kappa and Lambda is unremarkable on both B and plasma cells. CD10 is virtually negative on the biopsy. CD20 and PAX5 show a few B-cell clusters (<10%) and they are negative for Cd5, Cd10, and BCL1.       HEME Mutation Panel  Inactivating and splice site TET2 mutations detected. No MYD88, CARD11, CXCR4, KLF2, NOTCH1, NOTCH2 or any other mutations associated wtih specific lymphoma types are noted.     en cleared by or approved by the [employer] and Drug Administration (FDA). The laboratory is regulated under CLIA as qualified to perform high-complexity testing. The tests are used for clinical purposes. They should not be regarded as investigational or for research. Images          For Immediate Release to Patient's MyChart? Yes  NoAb[address]   Lab and Collection        PATHOLOGY:   Clinical History 7/22/21      Mass left parotid. *CYTOLOGIC DIAGNOSIS*      A.  LEFT PAROTID MASS, FNA (CYTOLOGY AND CELL BLOCK):      FINAL DIAGNOSIS:  · Non Diagnostic  · Normal Salivary Gland Elements Only        FNA Performed By:  Clinician       Electronically signed by Ariadne Meza MD on 7/23/2021 at  4:57 PM      Clinical Information: EXTRAMEDULLARY PLASMACYTOMA NOT HAVING ACHIEVED   REMISSION, C90.20 9/29/21    FINAL DIAGNOSIS:   Bone marrow core, clot, and aspirate smear, site unspecified:    Normocellular bone marrow (30-40%) with orderly trilineage   hematopoiesis      and 2% polyclonal mature plasma cells.    No significant morphologic or flow cytometric evidence of a monoclonal       plasma cell neoplasm.    Adequate iron stores.    Peripheral blood: mild anemia. GENETICS:  See above    MOLECULAR:  See above    ASSESSMENT/PLAN:    1: Diagnosis: 47-year-old gentleman with a left neck mass on core biopsy at [facility] favors a plasmacytoma. Patient needs thorough evaluation to rule out myeloma. He is asymptomatic. Bone marrow biopsy is uninvolved histologically and by flow cytometry. PET scan does not show findings suggestive of myeloma in particular no bone lesions. The findings of mediastinal node uptake is unexpected and needs [facility] EBUS to work this up further. I suspect that is not related to the plasmacytoma histology. Indeterminate 9 mm left apical lesion as well. 2) Prognosis / Disease Status: Excellent of a solitary plasmacytoma. Definition will require less than 10% plasma cells in the marrow. Depending the results of his mediastinal lymph node subcarinal biopsy he may and be referred for radiation to the extra medullary plasmacytoma in the neck. Soft tissue plasmacytomas have a higher cure rate than those involving bone. 3) Work-up:    Labs: Reviewed above. None drawn today. Imaging: PET/CT reviewed with radiology today. Procedures: Bone marrow aspiration biopsy for ruling out myeloma. Results reviewed and are negative. Consults: Dr. Azalea Iniguez pulmonary for bronchoscopy EBUS specifically to biopsy the subcarinal node. Other:     4) Symptom Management: None needed      5) Supportive care provided. Level of care is appropriate. Teaching done today. Reviewed the differential plasmacytoma. Reviewed the findings on the PET scan.   It is my opinion at this point that the mediastinal nodes are not plasmacytomas as this would be quite unusual.      6) Treatment goal: Cure      Treatment plan:      Pending completion of staging evaluation and diagnostic work-up. If he truly has a solitary plasmacytoma he will have a very high cure rate with radiation alone. 7) Medications reviewed. Prescriptions today: None            No orders of the defined types were placed in this encounter. OARRS:  Controlled Substance Monitoring:    Acute and Chronic Pain Monitoring:   No flowsheet data found. 8) Research Options:   None at this time      9) Other:        None    10) Follow Up:  Await results of [facility] EBUS. Follow-up with me in 3 weeks. Consult radiation oncology after above work-up done at next follow-up with me.         Porsha Owusu MD

## 2021-10-15 ENCOUNTER — TELEPHONE (OUTPATIENT)
Dept: PULMONOLOGY | Age: 81
End: 2021-10-15

## 2021-10-15 ENCOUNTER — OFFICE VISIT (OUTPATIENT)
Dept: PULMONOLOGY | Age: 81
End: 2021-10-15
Payer: MEDICARE

## 2021-10-15 VITALS
BODY MASS INDEX: 29.2 KG/M2 | SYSTOLIC BLOOD PRESSURE: 122 MMHG | HEART RATE: 69 BPM | WEIGHT: 204 LBS | TEMPERATURE: 97.9 F | DIASTOLIC BLOOD PRESSURE: 80 MMHG | HEIGHT: 70 IN | OXYGEN SATURATION: 98 %

## 2021-10-15 DIAGNOSIS — R59.0 LAD (LYMPHADENOPATHY), MEDIASTINAL: Primary | ICD-10-CM

## 2021-10-15 DIAGNOSIS — D49.89 PLASMA CELL NEOPLASM: ICD-10-CM

## 2021-10-15 DIAGNOSIS — J84.10 GRANULOMATOUS LUNG DISEASE (HCC): ICD-10-CM

## 2021-10-15 DIAGNOSIS — R59.0 LAD (LYMPHADENOPATHY) OF LEFT CERVICAL REGION: ICD-10-CM

## 2021-10-15 DIAGNOSIS — Z87.891 PERSONAL HISTORY OF SMOKING: ICD-10-CM

## 2021-10-15 PROCEDURE — 99204 OFFICE O/P NEW MOD 45 MIN: CPT | Performed by: INTERNAL MEDICINE

## 2021-10-15 PROCEDURE — G8417 CALC BMI ABV UP PARAM F/U: HCPCS | Performed by: INTERNAL MEDICINE

## 2021-10-15 PROCEDURE — G8484 FLU IMMUNIZE NO ADMIN: HCPCS | Performed by: INTERNAL MEDICINE

## 2021-10-15 PROCEDURE — G8427 DOCREV CUR MEDS BY ELIG CLIN: HCPCS | Performed by: INTERNAL MEDICINE

## 2021-10-15 PROCEDURE — 1036F TOBACCO NON-USER: CPT | Performed by: INTERNAL MEDICINE

## 2021-10-15 PROCEDURE — 4040F PNEUMOC VAC/ADMIN/RCVD: CPT | Performed by: INTERNAL MEDICINE

## 2021-10-15 PROCEDURE — 1123F ACP DISCUSS/DSCN MKR DOCD: CPT | Performed by: INTERNAL MEDICINE

## 2021-10-15 ASSESSMENT — ENCOUNTER SYMPTOMS
GASTROINTESTINAL NEGATIVE: 1
EYES NEGATIVE: 1
RESPIRATORY NEGATIVE: 1
ALLERGIC/IMMUNOLOGIC NEGATIVE: 1

## 2021-10-15 NOTE — TELEPHONE ENCOUNTER
I called Dr. Jonathon Reilly office to ask if it's ok to stop plavix for 5 days prior to patients EBUS. Dr. Natalie Burns is cadence til October 26 2021. I called Dr. Ej Vasquez office and asked if it was ok to stop his plavix 5 days prior, they said they would call us back today.

## 2021-10-15 NOTE — TELEPHONE ENCOUNTER
Lorelei from Dr Bergeron's office returned call. She said they have not seen the patient recently and do not prescribe the plavix. They recommended the pulmonary office reach out to whomever prescribes it or the [facility] ENT/Hematology/Oncology department? Call her is need anything further.

## 2021-10-15 NOTE — PROGRESS NOTES
Subjective:      Patient ID: Derek Fernando is a 80 y.o. male. CC: Mediastinal LAD    HPI   Ruth Valle is referred by Dr Aliya Nelson for EBUS. Developed asymptomatic enlargement of station II L cervical LN while in [place], had excision (8/24/21): ---Plasma cell neoplasm    W/U include - PET-CT revealing FDG avid mediastinal LNs: 1.9 cm subcarinal node max SUV 4.9, non enlarged L hilar LN max SUV 3.2, also 1 cm L inguinal LN max SUV 4.6 cm  -Bone marrow biopsy: not revealing    PET-CT also shows extensive calcified mediastinal LAD and calcified parenchymal granulomas. Smoked 1/2 ppd from 5 to 1992. 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 CAD PCI 2 stents, carotid stent placement  On Plavix and ASA. No personal h/o cancer    Feels good, denies any systemic or local symptoms    Review of Systems   Constitutional: Negative. HENT: Negative. Eyes: Negative. Respiratory: Negative. Cardiovascular: Negative. Gastrointestinal: Negative. Endocrine: Negative. Musculoskeletal: Negative. Allergic/Immunologic: Negative. Neurological: Negative.           All other systems reviewed and are negative    Lung Nodule Screening     [] Qualifies    [] Does not qualify   [] Declined   [] Completed  Past Medical History:   Diagnosis Date    CAD (coronary artery disease)     Enlarged lymph nodes     Heart disease     Hyperlipidemia     Hypertension      Past Surgical History:   Procedure Laterality Date    ABSCESS DRAINAGE      SPIDER BITE    CAROTID STENT PLACEMENT  2004    COLONOSCOPY      CT BONE MARROW BIOPSY  9/29/2021    CT BONE MARROW BIOPSY 9/29/2021 [facility] CT SCAN    HERNIA REPAIR  05/18/2016     Social History     Tobacco Use    Smoking status: Former Smoker     Packs/day: 1.00     Years: 37.00     Pack years: 37.00     Types: Cigarettes     Start date: 1964     Quit date: 2001     Years since quittin.0    Smokeless tobacco: Never Used   Substance Use Topics    Alcohol use: Yes    Drug use: Never      No Known Allergies   Family History   Problem Relation Age of Onset    Arthritis Mother     Heart Disease Father      Current Outpatient Medications   Medication Sig Dispense Refill    OMEPRAZOLE PO Take by mouth      cetirizine (ZYRTEC) 10 MG tablet Take 10 mg by mouth daily      Multiple Vitamins-Minerals (THERAPEUTIC MULTIVITAMIN-MINERALS) tablet Take 1 tablet by mouth daily      pravastatin (PRAVACHOL) 80 MG tablet Take 80 mg by mouth daily.  metoprolol (LOPRESSOR) 25 MG tablet Take 25 mg by mouth 2 times daily.  lisinopril (PRINIVIL;ZESTRIL) 5 MG tablet Take 5 mg by mouth daily.  clopidogrel (PLAVIX) 75 MG tablet Take 75 mg by mouth daily.  aspirin 81 MG tablet Take 81 mg by mouth daily. No current facility-administered medications for this visit. LABS - none   There were no vitals taken for this visit. Wt Readings from Last 3 Encounters:   10/13/21 205 lb (93 kg)   21 199 lb 12.8 oz (90.6 kg)   21 206 lb 8 oz (93.7 kg)     Neck Circumference - 16.75 inches   Mallampati Score - 2        Objective:   Physical Exam  Constitutional:       Appearance: Normal appearance. HENT:      Head: Normocephalic. Nose: Nose normal.      Mouth/Throat:      Mouth: Mucous membranes are moist.   Eyes:      Pupils: Pupils are equal, round, and reactive to light. Cardiovascular:      Rate and Rhythm: Normal rate and regular rhythm. Heart sounds: Normal heart sounds. Pulmonary:      Effort: Pulmonary effort is normal.      Breath sounds: Normal breath sounds. Abdominal:      General: Abdomen is flat. Bowel sounds are normal.   Musculoskeletal:         General: Normal range of motion. Cervical back: Normal range of motion and neck supple.    Skin:     Capillary Refill: Capillary refill takes less than 2 seconds. Findings: Rash:      Neurological:      Mental Status: He is alert. FINDINGS:        Neck: Confusion confluent left-sided level 2 cervical lymph nodes measure 1.2 cm in short axis diameter and are associated with a maximum SUV of 2.3 (image 50).     Chest: Cardiac size is normal. Atherosclerotic calcifications are present in the thoracic aorta and coronary arteries. 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 There is no pleural or    pericardial effusion. Calcified granulomas are present in the bilateral lungs. There is scarring at the bilateral lung apices. An indistinct 0.9 cm density in the left lung apex has a maximum SUV of 1.8 (image 94). Minimal alveolar opacities at the    bilateral lung bases are likely infectious or inflammatory. There are multiple FDG avid mediastinal lymph nodes. A representative subcarinal lymph node measures 1.9 cm in short axis diameter and has a maximum SUV of 4.9 (image 115). A nonenlarged left    hilar lymph node has a maximum SUV of 3.2 (image 108). There is no FDG avid axillary lymphadenopathy. Activity associated with a small hiatal hernia may be infectious or inflammatory. Degenerative changes are present in the thoracic spine without    evidence of hypermetabolic osseous metastatic disease. Periarticular activity at the bilateral shoulders is likely degenerative.       Abdomen/pelvis: Physiologic activity is present in the liver, spleen, urinary collecting system and gastrointestinal tract. There is a calcification in the lumen of the gallbladder. There is mild fatty replacement of the pancreas. Atherosclerotic    calcific lesions are present in the abdominal aorta without evidence of aneurysm. There are diverticula in sigmoid colon without evidence of diverticulitis. The prostate gland is enlarged and contains calcifications.  There is no FDG avid mesenteric,    retroperitoneal or pelvic lymphadenopathy. A left inguinal lymph node which measures 1.0 cm in short axis diameter has a maximum SUV of 4.6 (image 268). Degenerative changes are seen in the lumbar spine and pelvis without evidence of hypermetabolic    osseous metastatic disease.           Impression   1. Confluent mildly FDG avid left cervical lymphadenopathy which likely corresponds to known malignancy. 2. FDG avid mediastinal, left hilar and left inguinal lymphadenopathy suspicious for metastatic disease. 3. Small, mildly FDG avid density at the left lung apex of indeterminant etiology. Malignancy cannot be excluded.       Final report electronically signed by Dr. Amy Lynn on 10/7/2021 11:13 AM           Assessment:       Diagnosis Orders   1. LAD (lymphadenopathy), mediastinal  [facility] EBUS GUIDED SAMPL 1/2 NODE STATION/STRUX   2. LAD (lymphadenopathy) of left cervical region  PA [facility] EBUS GUIDED SAMPL 1/2 NODE STATION/STRUX   3. Plasma cell neoplasm     4. Personal history of smoking     5. 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 Plan:      Orders Placed This Encounter   Procedures    TREATMENT CONSENT     Consent for: Bronchoscopy with EBUS and biopsies     Standing Status:   Future     Standing Expiration Date:   10/15/2022    PA [address] EBUS GUIDED SAMPL 1/2 NODE STATION/STRUX     Standing Status:   Future     Standing Expiration Date:   10/15/2022      Viridiana Kim will check with his cardiologist to see if ok to stop Plavix before procedure.

## 2021-10-22 ENCOUNTER — ANESTHESIA EVENT (OUTPATIENT)
Dept: ENDOSCOPY | Age: 81
End: 2021-10-22
Payer: MEDICARE

## 2021-10-22 ENCOUNTER — HOSPITAL ENCOUNTER (OUTPATIENT)
Age: 81
Setting detail: OUTPATIENT SURGERY
Discharge: HOME | End: 2021-10-22
Attending: INTERNAL MEDICINE | Admitting: INTERNAL MEDICINE
Payer: MEDICARE

## 2021-10-22 ENCOUNTER — ANESTHESIA (OUTPATIENT)
Dept: ENDOSCOPY | Age: 81
End: 2021-10-22
Payer: MEDICARE

## 2021-10-22 ENCOUNTER — APPOINTMENT (OUTPATIENT)
Dept: GENERAL RADIOLOGY | Age: 81
End: 2021-10-22
Attending: INTERNAL MEDICINE
Payer: MEDICARE

## 2021-10-22 VITALS
TEMPERATURE: 96.6 F | SYSTOLIC BLOOD PRESSURE: 148 MMHG | WEIGHT: 201.8 LBS | HEIGHT: 70 IN | HEART RATE: 68 BPM | RESPIRATION RATE: 16 BRPM | OXYGEN SATURATION: 99 % | BODY MASS INDEX: 28.89 KG/M2 | DIASTOLIC BLOOD PRESSURE: 74 MMHG

## 2021-10-22 VITALS — TEMPERATURE: 98.6 F | SYSTOLIC BLOOD PRESSURE: 173 MMHG | DIASTOLIC BLOOD PRESSURE: 73 MMHG | OXYGEN SATURATION: 100 %

## 2021-10-22 PROCEDURE — 87102 FUNGUS ISOLATION CULTURE: CPT

## 2021-10-22 PROCEDURE — 3700000001 HC ADD 15 MINUTES (ANESTHESIA): Performed by: INTERNAL MEDICINE

## 2021-10-22 PROCEDURE — 71045 X-RAY EXAM CHEST 1 VIEW: CPT

## 2021-10-22 PROCEDURE — 89051 BODY FLUID CELL COUNT: CPT

## 2021-10-22 PROCEDURE — 7100000011 HC PHASE II RECOVERY - ADDTL 15 MIN: Performed by: INTERNAL MEDICINE

## 2021-10-22 PROCEDURE — 31652 BRONCH EBUS SAMPLNG 1/2 NODE: CPT | Performed by: INTERNAL MEDICINE

## 2021-10-22 PROCEDURE — 2580000003 HC RX 258: Performed by: INTERNAL MEDICINE

## 2021-10-22 PROCEDURE — 87070 CULTURE OTHR SPECIMN AEROBIC: CPT

## 2021-10-22 PROCEDURE — 87116 MYCOBACTERIA CULTURE: CPT

## 2021-10-22 PROCEDURE — 88177 CYTP FNA EVAL EA ADDL: CPT

## 2021-10-22 PROCEDURE — 86356 MONONUCLEAR CELL ANTIGEN: CPT

## 2021-10-22 PROCEDURE — 6360000002 HC RX W HCPCS

## 2021-10-22 PROCEDURE — 87077 CULTURE AEROBIC IDENTIFY: CPT

## 2021-10-22 PROCEDURE — 3700000000 HC ANESTHESIA ATTENDED CARE: Performed by: INTERNAL MEDICINE

## 2021-10-22 PROCEDURE — 7100000001 HC PACU RECOVERY - ADDTL 15 MIN: Performed by: INTERNAL MEDICINE

## 2021-10-22 PROCEDURE — 88173 CYTOPATH EVAL FNA REPORT: CPT

## 2021-10-22 PROCEDURE — 3609020000 HC BRONCHOSCOPY W/EBUS FNA: Performed by: INTERNAL MEDICINE

## 2021-10-22 PROCEDURE — 88172 CYTP DX EVAL FNA 1ST EA SITE: CPT

## 2021-10-22 PROCEDURE — 7100000010 HC PHASE II RECOVERY - FIRST 15 MIN: Performed by: INTERNAL MEDICINE

## 2021-10-22 PROCEDURE — 87205 SMEAR GRAM STAIN: CPT

## 2021-10-22 PROCEDURE — 7100000000 HC PACU RECOVERY - FIRST 15 MIN: Performed by: INTERNAL MEDICINE

## 2021-10-22 PROCEDURE — 2500000003 HC RX 250 WO HCPCS

## 2021-10-22 PROCEDURE — 88305 TISSUE EXAM BY PATHOLOGIST: CPT

## 2021-10-22 PROCEDURE — 88112 CYTOPATH CELL ENHANCE TECH: CPT

## 2021-10-22 PROCEDURE — 31624 DX BRONCHOSCOPE/LAVAGE: CPT | Performed by: INTERNAL MEDICINE

## 2021-10-22 PROCEDURE — 87186 SC STD MICRODIL/AGAR DIL: CPT

## 2021-10-22 PROCEDURE — 2720000010 HC SURG SUPPLY STERILE: Performed by: INTERNAL MEDICINE

## 2021-10-22 RX ORDER — SODIUM CHLORIDE 0.9 % (FLUSH) 0.9 %
5-40 SYRINGE (ML) INJECTION EVERY 12 HOURS SCHEDULED
Status: CANCELLED | OUTPATIENT
Start: 2021-10-22

## 2021-10-22 RX ORDER — ONDANSETRON 2 MG/ML
INJECTION INTRAMUSCULAR; INTRAVENOUS PRN
Status: DISCONTINUED | OUTPATIENT
Start: 2021-10-22 | End: 2021-10-22 | Stop reason: SDUPTHER

## 2021-10-22 RX ORDER — SODIUM CHLORIDE 450 MG/100ML
INJECTION, SOLUTION INTRAVENOUS CONTINUOUS
Status: DISCONTINUED | OUTPATIENT
Start: 2021-10-22 | End: 2021-10-22 | Stop reason: HOSPADM

## 2021-10-22 RX ORDER — ACETAMINOPHEN 325 MG/1
650 TABLET ORAL EVERY 6 HOURS PRN
COMMUNITY

## 2021-10-22 RX ORDER — FENTANYL CITRATE 50 UG/ML
INJECTION, SOLUTION INTRAMUSCULAR; INTRAVENOUS PRN
Status: DISCONTINUED | OUTPATIENT
Start: 2021-10-22 | End: 2021-10-22 | Stop reason: SDUPTHER

## 2021-10-22 RX ORDER — SODIUM CHLORIDE 9 MG/ML
25 INJECTION, SOLUTION INTRAVENOUS PRN
Status: DISCONTINUED | OUTPATIENT
Start: 2021-10-22 | End: 2021-10-22 | Stop reason: HOSPADM

## 2021-10-22 RX ORDER — PROPOFOL 10 MG/ML
INJECTION, EMULSION INTRAVENOUS PRN
Status: DISCONTINUED | OUTPATIENT
Start: 2021-10-22 | End: 2021-10-22 | Stop reason: SDUPTHER

## 2021-10-22 RX ORDER — SODIUM CHLORIDE 0.9 % (FLUSH) 0.9 %
5-40 SYRINGE (ML) INJECTION EVERY 12 HOURS SCHEDULED
Status: DISCONTINUED | OUTPATIENT
Start: 2021-10-22 | End: 2021-10-22 | Stop reason: HOSPADM

## 2021-10-22 RX ORDER — SODIUM CHLORIDE 0.9 % (FLUSH) 0.9 %
5-40 SYRINGE (ML) INJECTION EVERY 12 HOURS SCHEDULED
Status: DISCONTINUED | OUTPATIENT
Start: 2021-10-22 | End: 2021-10-22 | Stop reason: SDUPTHER

## 2021-10-22 RX ORDER — SUCCINYLCHOLINE/SOD CL,ISO/PF 200MG/10ML
SYRINGE (ML) INTRAVENOUS PRN
Status: DISCONTINUED | OUTPATIENT
Start: 2021-10-22 | End: 2021-10-22 | Stop reason: SDUPTHER

## 2021-10-22 RX ORDER — ROCURONIUM BROMIDE 10 MG/ML
INJECTION, SOLUTION INTRAVENOUS PRN
Status: DISCONTINUED | OUTPATIENT
Start: 2021-10-22 | End: 2021-10-22 | Stop reason: SDUPTHER

## 2021-10-22 RX ORDER — LIDOCAINE HYDROCHLORIDE 20 MG/ML
INJECTION, SOLUTION INTRAVENOUS PRN
Status: DISCONTINUED | OUTPATIENT
Start: 2021-10-22 | End: 2021-10-22 | Stop reason: SDUPTHER

## 2021-10-22 RX ORDER — DEXAMETHASONE SODIUM PHOSPHATE 4 MG/ML
INJECTION, SOLUTION INTRA-ARTICULAR; INTRALESIONAL; INTRAMUSCULAR; INTRAVENOUS; SOFT TISSUE PRN
Status: DISCONTINUED | OUTPATIENT
Start: 2021-10-22 | End: 2021-10-22 | Stop reason: SDUPTHER

## 2021-10-22 RX ORDER — SODIUM CHLORIDE 0.9 % (FLUSH) 0.9 %
5-40 SYRINGE (ML) INJECTION PRN
Status: DISCONTINUED | OUTPATIENT
Start: 2021-10-22 | End: 2021-10-22 | Stop reason: HOSPADM

## 2021-10-22 RX ADMIN — PROPOFOL 150 MG: 10 INJECTION, EMULSION INTRAVENOUS at 13:05

## 2021-10-22 RX ADMIN — ONDANSETRON HYDROCHLORIDE 4 MG: 4 INJECTION, SOLUTION INTRAMUSCULAR; INTRAVENOUS at 14:00

## 2021-10-22 RX ADMIN — LIDOCAINE HYDROCHLORIDE 100 MG: 20 INJECTION, SOLUTION INTRAVENOUS at 12:57

## 2021-10-22 RX ADMIN — SODIUM CHLORIDE: 4.5 INJECTION, SOLUTION INTRAVENOUS at 11:45

## 2021-10-22 RX ADMIN — DEXAMETHASONE SODIUM PHOSPHATE 8 MG: 4 INJECTION, SOLUTION INTRAMUSCULAR; INTRAVENOUS at 13:10

## 2021-10-22 RX ADMIN — Medication 120 MG: at 12:57

## 2021-10-22 RX ADMIN — FENTANYL CITRATE 50 MCG: 50 INJECTION, SOLUTION INTRAMUSCULAR; INTRAVENOUS at 12:50

## 2021-10-22 RX ADMIN — PROPOFOL 150 MG: 10 INJECTION, EMULSION INTRAVENOUS at 12:57

## 2021-10-22 RX ADMIN — SUGAMMADEX 200 MG: 100 INJECTION, SOLUTION INTRAVENOUS at 14:00

## 2021-10-22 RX ADMIN — FENTANYL CITRATE 50 MCG: 50 INJECTION, SOLUTION INTRAMUSCULAR; INTRAVENOUS at 13:15

## 2021-10-22 RX ADMIN — ROCURONIUM BROMIDE 30 MG: 10 INJECTION INTRAVENOUS at 13:05

## 2021-10-22 ASSESSMENT — PULMONARY FUNCTION TESTS
PIF_VALUE: 24
PIF_VALUE: 30
PIF_VALUE: 24
PIF_VALUE: 18
PIF_VALUE: 16
PIF_VALUE: 2
PIF_VALUE: 21
PIF_VALUE: 15
PIF_VALUE: 24
PIF_VALUE: 16
PIF_VALUE: 19
PIF_VALUE: 16
PIF_VALUE: 23
PIF_VALUE: 24
PIF_VALUE: 18
PIF_VALUE: 19
PIF_VALUE: 29
PIF_VALUE: 23
PIF_VALUE: 22
PIF_VALUE: 23
PIF_VALUE: 17
PIF_VALUE: 22
PIF_VALUE: 23
PIF_VALUE: 16
PIF_VALUE: 18
PIF_VALUE: 21
PIF_VALUE: 17
PIF_VALUE: 22
PIF_VALUE: 23
PIF_VALUE: 24
PIF_VALUE: 24
PIF_VALUE: 13
PIF_VALUE: 24
PIF_VALUE: 3
PIF_VALUE: 22
PIF_VALUE: 20
PIF_VALUE: 17
PIF_VALUE: 19
PIF_VALUE: 0
PIF_VALUE: 19
PIF_VALUE: 19
PIF_VALUE: 17
PIF_VALUE: 21
PIF_VALUE: 29
PIF_VALUE: 18
PIF_VALUE: 25
PIF_VALUE: 15
PIF_VALUE: 25
PIF_VALUE: 17
PIF_VALUE: 24
PIF_VALUE: 21
PIF_VALUE: 16
PIF_VALUE: 22
PIF_VALUE: 19
PIF_VALUE: 24
PIF_VALUE: 26
PIF_VALUE: 16
PIF_VALUE: 24
PIF_VALUE: 16
PIF_VALUE: 15
PIF_VALUE: 1
PIF_VALUE: 14
PIF_VALUE: 23
PIF_VALUE: 19
PIF_VALUE: 29
PIF_VALUE: 18
PIF_VALUE: 24
PIF_VALUE: 1
PIF_VALUE: 17

## 2021-10-22 ASSESSMENT — PAIN SCALES - GENERAL
PAINLEVEL_OUTOF10: 0

## 2021-10-22 ASSESSMENT — PAIN - FUNCTIONAL ASSESSMENT: PAIN_FUNCTIONAL_ASSESSMENT: 0-10

## 2021-10-22 NOTE — PROGRESS NOTES
ebus completed ebus scope 277 used     120 passes done in station 7    Switched to bronchcoscopy and BAL collected    Bronch scope bf 001 used    Specimens and slides sent with pathologist     Patient taken to pacu and report given to pacu rn    Tolerated well

## 2021-10-22 NOTE — PROGRESS NOTES
1415  Pt. Resting quietly on adm. To pacu. Pt. Responds to name easily. Pt. Oriented. Pt. Denies pain. 1420  Pt. Awake and oriented. o2 discontinued. 1445  pacu criteria met. Transfer to endoscopy.

## 2021-10-22 NOTE — PROGRESS NOTES
Bruce admitted to [facility] for EBUS with Dr. Victor Hugo Barillas. Consent form signed and verified.

## 2021-10-22 NOTE — ANESTHESIA PRE PROCEDURE
Department of Anesthesiology  Preprocedure Note       Name:  Pasha Saavedra   Age:  80 y.o.  :  1940                                          MRN:  [id number]         Date:  10/22/2021      Surgeon: Jessie Mohs):  Husam Lazcano MD    Procedure: Procedure(s):  BRONCHOSCOPY W/EBUS FNA    Medications prior to admission:   Prior to Admission medications    Medication Sig Start Date End Date Taking? Authorizing Provider   acetaminophen (TYLENOL) 325 MG tablet Take 650 mg by mouth every 6 hours as needed for Pain   Yes Historical Provider, MD   OMEPRAZOLE PO Take by mouth   Yes Historical Provider, MD   cetirizine (ZYRTEC) 10 MG tablet Take 10 mg by mouth daily   Yes Historical Provider, MD   Multiple Vitamins-Minerals (THERAPEUTIC MULTIVITAMIN-MINERALS) tablet Take 1 tablet by mouth daily   Yes Historical Provider, MD   pravastatin (PRAVACHOL) 80 MG tablet Take 80 mg by mouth daily. Yes Historical Provider, MD   metoprolol (LOPRESSOR) 25 MG tablet Take 25 mg by mouth 2 times daily. Yes Historical Provider, MD   lisinopril (PRINIVIL;ZESTRIL) 5 MG tablet Take 5 mg by mouth daily. Yes Historical Provider, MD   aspirin 81 MG tablet Take 81 mg by mouth daily.     Historical Provider, MD       Current medications:    Current Facility-Administered Medications   Medication Dose Route Frequency Provider Last Rate Last Admin    0.45 % sodium chloride infusion   IntraVENous Continuous Husam Lazcano  mL/hr at 10/22/21 1145 New Bag at 10/22/21 1145    sodium chloride flush 0.9 % injection 5-40 mL  5-40 mL IntraVENous 2 times per day Husam aLzcano MD        sodium chloride flush 0.9 % injection 5-40 mL  5-40 mL IntraVENous PRN Husam Lazcano MD        0.9 % sodium chloride infusion  25 mL IntraVENous PRN Husam Lazcano MD        0.45 % sodium chloride infusion   IntraVENous Continuous Husam Lazcano MD         Facility-Administered Medications Ordered in Other Encounters   Medication Dose Route Frequency Provider Last Rate Last Admin    fentaNYL (SUBLIMAZE) injection   IntraVENous PRN Tray Priestly, APRN - CRNA   50 mcg at 10/22/21 1315    propofol injection   IntraVENous PRN Tray Priestly, APRN - CRNA   150 mg at 10/22/21 1305    rocuronium (ZEMURON) injection   IntraVENous PRN Tray Priestly, APRN - CRNA   30 mg at 10/22/21 1305    Dexamethasone Sodium Phosphate injection   IntraVENous PRN Tray Priestly, APRN - CRNA   8 mg at 10/22/21 1310       Allergies:  No Known Allergies    Problem List:  There is no problem list on file for this patient.       Past Medical History:        Diagnosis Date    CAD (coronary artery disease)     Enlarged lymph nodes     Heart disease     Hyperlipidemia     Hypertension        Past Surgical History:        Procedure Laterality Date    ABSCESS DRAINAGE      SPIDER BITE    CAROTID STENT PLACEMENT      COLONOSCOPY      CT BONE MARROW BIOPSY  2021    CT BONE MARROW BIOPSY 2021 [facility] CT SCAN    HERNIA REPAIR  2016       Social History:    Social History     Tobacco Use    Smoking status: Former Smoker     Packs/day: 1.00     Years: 37.00     Pack years: 37.00     Types: Cigarettes     Start date: 1964     Quit date: 2001     Years since quittin.0    Smokeless tobacco: Never Used   Substance Use Topics    Alcohol use: Yes     Comment: beer every so often                                 Counseling given: Not Answered      Vital Signs (Current):   Vitals:    10/22/21 1120   BP: (!) 159/74   Pulse: 72   Resp: 18   Temp: 36.2 °C (97.1 °F)   TempSrc: Temporal   SpO2: 98%   Weight: 201 lb 12.8 oz (91.5 kg)   Height: 5' 10\" (1.778 m)                                              BP Readings from Last 3 Encounters:   10/22/21 (!) 159/74   10/22/21 (!) 190/91   10/15/21 122/80       NPO Status: Time of last liquid consumption: 2200                        Time of last solid consumption: 1700 Date of last liquid consumption: 10/21/21                        Date of last solid food consumption: 10/21/21    BMI:   Wt Readings from Last 3 Encounters:   10/22/21 201 lb 12.8 oz (91.5 kg)   10/15/21 204 lb (92.5 kg)   10/13/21 205 lb (93 kg)     Body mass index is 28.96 kg/m². CBC:   Lab Results   Component Value Date    WBC 7.4 09/29/2021    RBC 4.36 09/29/2021    HGB 12.9 09/29/2021    HCT 41.3 09/29/2021    MCV 94.7 09/29/2021    RDW 14.1 09/23/2021     09/29/2021       CMP:   Lab Results   Component Value Date     09/23/2021    K 4.1 09/23/2021     09/23/2021    CO2 24 09/23/2021    BUN 23 09/23/2021    CREATININE 1.4 09/29/2021    LABGLOM 49 09/29/2021    GLUCOSE 118 09/23/2021    PROT 8.5 09/23/2021    CALCIUM 10.1 09/23/2021    BILITOT 0.2 09/23/2021    ALKPHOS 100 09/23/2021    AST 19 09/23/2021    ALT 17 09/23/2021       POC Tests: No results for input(s): POCGLU, POCNA, POCK, POCCL, POCBUN, POCHEMO, POCHCT in the last 72 hours. Coags: No results found for: PROTIME, INR, APTT    HCG (If Applicable): No results found for: PREGTESTUR, PREGSERUM, HCG, HCGQUANT     ABGs: No results found for: PHART, PO2ART, FNH3SUY, PRX1SJQ, BEART, P0WHWBQA     Type & Screen (If Applicable):  No results found for: LABABO, LABRH    Drug/Infectious Status (If Applicable):  No results found for: HIV, HEPCAB    COVID-19 Screening (If Applicable): No results found for: COVID19        Anesthesia Evaluation  Nursing notes reviewed  Airway: Mallampati: II  TM distance: >3 FB   Neck ROM: full  Mouth opening: > = 3 FB Dental:          Pulmonary:Negative Pulmonary ROS and normal exam                               Cardiovascular:    (+) hypertension:, CAD:,                   Neuro/Psych:   Negative Neuro/Psych ROS              GI/Hepatic/Renal: Neg GI/Hepatic/Renal ROS            Endo/Other: Negative Endo/Other ROS                    Abdominal:             Vascular: negative vascular ROS. Other Findings:             Anesthesia Plan      general     ASA 3       Induction: intravenous. Anesthetic plan and risks discussed with patient. Use of blood products discussed with patient whom. Plan discussed with attending.     Attending anesthesiologist reviewed and agrees with Preprocedure content              Ulis Cockayne, APRN - [place]   10/22/2021

## 2021-10-22 NOTE — H&P
[facility]    Patient - Severiano Larsen   MRN -  [id number]   [facility]   - 1940      Date of Admission -  10/22/2021 10:56 AM  Primary Care Physician - Eric Bergeron DO  Date of evaluation -  10/22/2021  Room - [facility] END[facility] POOL RM/NONE   HD - 0  Chief Complaint   Mediastinal LAD  HPI   CC: Mediastinal LAD     HPI   Neoma Goody is referred by Dr Misha Garcia for EBUS. Developed asymptomatic enlargement of station II L cervical LN while in [place], had excision (21): ---Plasma cell neoplasm     W/U include - PET-CT revealing FDG avid mediastinal LNs: 1.9 cm subcarinal node max SUV 4.9, non enlarged L hilar LN max SUV 3.2, also 1 cm L inguinal LN max SUV 4.6 cm  -Bone marrow biopsy: not revealing     PET-CT also shows extensive calcified mediastinal LAD and calcified parenchymal granulomas.     Smoked 1/2 ppd from  to . 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 CAD PCI 2 stents, carotid stent placement  On Plavix and ASA. No personal h/o cancer     Past Medical History         Diagnosis Date    CAD (coronary artery disease)     Enlarged lymph nodes     Heart disease     Hyperlipidemia     Hypertension       Past Surgical History           Procedure Laterality Date    ABSCESS DRAINAGE      SPIDER BITE    CAROTID STENT PLACEMENT      COLONOSCOPY      CT BONE MARROW BIOPSY  2021    CT BONE MARROW BIOPSY 2021 [facility] CT SCAN    HERNIA REPAIR  2016     Allergies    Patient has no known allergies.   Social History      Social History     Socioeconomic History    Marital status:      Spouse name: Not on file    Number of children: Not on file    Years of education: Not on file    Highest education level: Not on file   Occupational History    Not on file   Tobacco Use    Smoking status: Former Smoker     Packs/day: 1.00     Years: 37.00     Pack years: 37.00     Types: Cigarettes     Start date: 1964     Quit date: 2001     Years since quittin.0    Smokeless tobacco: Never Used   Substance and Sexual Activity    Alcohol use: Yes     Comment: beer every so often     Drug use: Never    Sexual activity: Not on file   Other Topics Concern    Not on file   Social History Narrative    Not on file     Social Determinants of Health     Financial Resource Strain:     Difficulty of Paying Living Expenses:    Food Insecurity:     Worried About Running Out of Food in the Last Year:     [address] in the Last Year:    Transportation Needs:     Lack of Transportation (Medical):  Lack of Transportation (Non-Medical):    Physical Activity:     Days of Exercise per Week:     Minutes of Exercise per Session:    Stress:     Feeling of Stress :    Social Connections:     Frequency of Communication with Friends and Family:     Frequency of Social Gatherings with Friends and Family:     Attends [employer] Services:     Active Member of Clubs or Organizations:     Attends Club or Organization Meetings:     Marital Status:    Intimate Partner Violence:     Fear of Current or Ex-Partner:     Emotionally Abused:     Physically Abused:     Sexually Abused:       Family History          Problem Relation Age of Onset    Arthritis Mother     Heart Disease Father        ROS   Feels good, denies any systemic or local symptoms     Review of Systems   Constitutional: Negative. HENT: Negative. Eyes: Negative. Respiratory: Negative. Cardiovascular: Negative. Gastrointestinal: Negative. Endocrine: Negative. Musculoskeletal: Negative. Allergic/Immunologic: Negative.     Neurological: Negative.       All other systems reviewed and are negative  Meds       sodium chloride flush  5-40 mL IntraVENous 2 times per day     sodium chloride flush, sodium chloride   sodium chloride 100 mL/hr at 10/22/21 1145    sodium chloride      sodium chloride       Medications Prior to Admission: acetaminophen (TYLENOL) 325 MG tablet, Take 650 mg by mouth every 6 hours as needed for Pain  OMEPRAZOLE PO, Take by mouth  cetirizine (ZYRTEC) 10 MG tablet, Take 10 mg by mouth daily  Multiple Vitamins-Minerals (THERAPEUTIC MULTIVITAMIN-MINERALS) tablet, Take 1 tablet by mouth daily  pravastatin (PRAVACHOL) 80 MG tablet, Take 80 mg by mouth daily. metoprolol (LOPRESSOR) 25 MG tablet, Take 25 mg by mouth 2 times daily. lisinopril (PRINIVIL;ZESTRIL) 5 MG tablet, Take 5 mg by mouth daily. aspirin 81 MG tablet, Take 81 mg by mouth daily. [DISCONTINUED] clopidogrel (PLAVIX) 75 MG tablet, Take 75 mg by mouth daily. Vitals     height is 5' 10\" (1.778 m) and weight is 201 lb 12.8 oz (91.5 kg). His temporal temperature is 97.1 °F (36.2 °C). His blood pressure is 159/74 (abnormal) and his pulse is 72. His respiration is 18 and oxygen saturation is 98%. I/O    No intake or output data in the 24 hours ending 10/22/21 1222  Exam    Physical Exam  Constitutional:       Appearance: Normal appearance. HENT:      Head: Normocephalic. Nose: Nose normal.      Mouth/Throat:      Mouth: Mucous membranes are moist.   Eyes:      Pupils: Pupils are equal, round, and reactive to light. Cardiovascular:      Rate and Rhythm: Normal rate and regular rhythm. Heart sounds: Normal heart sounds. Pulmonary:      Effort: Pulmonary effort is normal.      Breath sounds: Normal breath sounds. Abdominal:      General: Abdomen is flat. Bowel sounds are normal.   Musculoskeletal:         General: Normal range of motion. Cervical back: Normal range of motion and neck supple. Skin:     Capillary Refill: Capillary refill takes less than 2 seconds.       Findings: Rash:   Labs   ABG  No results found for: PH, PO2, PCO2, HCO3, O2SAT  No results found for: IFIO2, MODE, SETTIDVOL, SETPEEP  CBC  No results 3.2 (image 108). There is no FDG avid axillary lymphadenopathy. Activity associated with a small hiatal hernia may be infectious or inflammatory. Degenerative changes are present in the thoracic spine without    evidence of hypermetabolic osseous metastatic disease. Periarticular activity at the bilateral shoulders is likely degenerative.       Abdomen/pelvis: Physiologic activity is present in the liver, spleen, urinary collecting system and gastrointestinal tract. There is a calcification in the lumen of the gallbladder. There is mild fatty replacement of the pancreas. Atherosclerotic    calcific lesions are present in the abdominal aorta without evidence of aneurysm. There are diverticula in sigmoid colon without evidence of diverticulitis. The prostate gland is enlarged and contains calcifications. There is no FDG avid mesenteric,    retroperitoneal or pelvic lymphadenopathy. A left inguinal lymph node which measures 1.0 cm in short axis diameter has a maximum SUV of 4.6 (image 268). Degenerative changes are seen in the lumbar spine and pelvis without evidence of hypermetabolic    osseous metastatic disease.           Impression   1. Confluent mildly FDG avid left cervical lymphadenopathy which likely corresponds to known malignancy. 2. FDG avid mediastinal, left hilar and left inguinal lymphadenopathy suspicious for metastatic disease. 3. Small, mildly FDG avid density at the left lung apex of indeterminant etiology. Malignancy cannot be excluded.       Final report electronically signed by Dr. Jony Ryder on 10/7/2021 11:13 AM              Active Hospital Problem List    There are no active hospital problems to display for this patient.     Assessment and Plan     Extramedullary cervical plasmacytoma  Calcified mediastinal LAD  Granulomatous lung disease    Will proceed wit Diagnostic bronchoscopy with EBUS  Informed consent obtained        Electronically signed by     Xena Calderon MD on 10/22/2021 at 12:22 PM

## 2021-10-22 NOTE — PROGRESS NOTES
Recovery mode, denies discomfort. Taking fluids. Dr. David Mckeon discussed findings with pt and . Discharge instructions provided and understanding verbalized.

## 2021-10-22 NOTE — ANESTHESIA POSTPROCEDURE EVALUATION
Department of Anesthesiology  Postprocedure Note    Patient: Mansoor Tolentino  MRN: [id number]  YOB: 1940  Date of evaluation: 10/22/2021  Time:  2:26 PM     Procedure Summary     Date: 10/22/21 Room / Location: [address] [place] [address]    Anesthesia Start: 5556 Anesthesia Stop: 4996    Procedure: BRONCHOSCOPY W/EBUS FNA (N/A ) Diagnosis: (MEDIASTINAL AND CERVICAL LYMPHADENOPATHY)    Surgeons: Kelsey Michele MD Responsible Provider: Daniel Gutiérrez MD    Anesthesia Type: general ASA Status: 3          Anesthesia Type: general    Hodan Phase I: Hodan Score: 10    Hodan Phase II:      Last vitals: Reviewed and per EMR flowsheets.        Anesthesia Post Evaluation    Patient location during evaluation: PACU  Patient participation: waiting for patient participation  Level of consciousness: sleepy but conscious  Pain score: 0  Nausea & Vomiting: no nausea and no vomiting  Complications: no  Cardiovascular status: blood pressure returned to baseline and hemodynamically stable  Respiratory status: acceptable, face mask and airway suctioned  Hydration status: stable

## 2021-10-22 NOTE — PROCEDURES
Bronchoscopy with EBUS (Endobronchial ultrasound guided) Procedure Note    Patient: Derek Fernando  : 1940      Operation:     -Flexible fiberoptic bronchoscopy, endobronchial ultrasound, transbronchial biopsy of lymph node/s   Flexible diagnostic fiberoptic bronchoscopy with BAL of RML    Date of Operation: 10/22/2021    Indication for procedure: Mediastinal lymphadenopathy, recent diagnosis of solitary extramedullary plasma cell neoplasm, mildly FDG avid mediastinal LAD      Pre operative diagnoses: Mediastinal lymphadenopathy, recent diagnosis of solitary extramedullary plasma cell neoplasm, mildly FDG avid mediastinal LAD        Pre operative diagnoses: Same, normal airways, mediastinal LAD      Surgeon: Brooke Harrison MD    Assistants: N/A    Consent: Derek Fernando is a 80 y.o. male . The risks, benefits, complications, treatment options and expected outcomes were discussed with the patient. Consent obtained from the patient after explaining the risks and complications of the procedure. The possibilities of reaction to medication, pulmonary aspiration, perforation of a airway, bleeding, failure to diagnose a condition and creating a complication requiring transfusion,respiratory failure and operation were discussed with the patient who freely signed the consent. Anesthesia: General endotracheal anesthesia/MAC anesthesia. Description of procedure:  A time out was taken. Please see anesthesiologist note for details. EBUS (Endobronchial ultrasound): After achieving adequate sedation the EBUS scope was passed through the ET tube. The standard Flexible fiberoptic bronchoscope was passed into the trachea. The natalie is sharp with no growths. Careful inspection of the tracheal lumen was accomplished with no growths.     The scope was  advanced into the right main bronchus and then into the Right upper lobe, Right middle lobe, and Right lower lobe bronchi and segmental bronchi. The scope was sequentially passed into the left main and then left upper and lower bronchi and segmental bronchi. Bronchioalveolar lavage performed from the RML. A good amount of Bronchioalveolar lavage I.e 50  ml was obtained after instilling 240 ml of sterile 0.9NS and sent to lab for further analysis. EBUS:     Direct visualization of the lymph nodes was obtained using endobronchial ultrasound (EBUS) guidance. A complete ultrasound lymph node exam was performed for lymph node stations 2, 4, 7, 10 and 11. Station 7 LNs were heterogenous in appearance, irregular, enlarged and with calcifications. Specimen/s:      7- 12 passes              Endobronchial findings: Normal airways  Trachea: Normal mucosa. Fabi: Normal mucosa  Right main bronchus: Normal mucosa  Right upper lobe bronchus: Normal mucosa  Right Middle lobe bronchus: Normal mucosa  Right Lower lobe bronchus:Normal mucosa  Left main bronchus: Normal mucosa  Left upper lobe bronchus: Normal mucosa  Left lower lobe bronchus: Normal mucosa      The Patient was taken to the Endoscopy Recovery area in satisfactory condition. Findings: Abnormal appearance of station 7 LNs, normal lymphocytes seen by NAT, no malignancy seen. Estimated Blood Loss: Less than 10 ml. Drains: N/A     Complications: None. Recommendation/Plan:    Follow post procedure portable chest Xray to rule out Pneumothorax prior to discharge patient . Follow up:  is with me in approximately three to five days. Patient is instructed to call with concerns and if follow up has not already been scheduled. In the event of severe symptoms or if the patient is unable to reach my office, instructions are given to proceed to the emergency department. Attestation: I performed the procedure.     Margret Clay MD

## 2021-10-23 LAB
BAL CHARACTER: CLEAR
BAL COLLECTION SITE: ABNORMAL
BAL COLOR: COLORLESS
CILIATED/EPITHELIAL CELLS BAL: 4 % (ref 0–5)
EOSINOPHILS BAL: 4 % (ref 0–1)
LYMPHOCYTES, BAL: 32 % (ref 10–15)
MACROPHAGE/MONOCYTE BAL: 40 % (ref 86–100)
PATHOLOGIST REVIEW: ABNORMAL
RBC BAL: 84 /CUMM
SEGMENTED NEUTROPHILS, BAL: 20 % (ref 0–3)
TOTAL NUCLEATED CELLS BAL: 145 /CUMM
TOTAL VOLUME RECEIVED BAL: 60 ML

## 2021-10-25 LAB
GRAM STAIN RESULT: ABNORMAL
ORGANISM: ABNORMAL
RESPIRATORY CULTURE: ABNORMAL
RESPIRATORY CULTURE: ABNORMAL

## 2021-10-29 LAB — MISC. #1 REFERENCE GROUP TEST: NORMAL

## 2021-11-01 ENCOUNTER — OFFICE VISIT (OUTPATIENT)
Dept: PULMONOLOGY | Age: 81
End: 2021-11-01
Payer: MEDICARE

## 2021-11-01 VITALS
HEART RATE: 72 BPM | OXYGEN SATURATION: 96 % | BODY MASS INDEX: 29.06 KG/M2 | WEIGHT: 203 LBS | TEMPERATURE: 97.5 F | DIASTOLIC BLOOD PRESSURE: 68 MMHG | HEIGHT: 70 IN | SYSTOLIC BLOOD PRESSURE: 132 MMHG

## 2021-11-01 DIAGNOSIS — J84.10 GRANULOMATOUS LUNG DISEASE (HCC): ICD-10-CM

## 2021-11-01 DIAGNOSIS — R59.0 LAD (LYMPHADENOPATHY), MEDIASTINAL: Primary | ICD-10-CM

## 2021-11-01 LAB
FUNGUS IDENTIFIED: ABNORMAL
FUNGUS SMEAR: ABNORMAL
ORGANISM: ABNORMAL

## 2021-11-01 PROCEDURE — 1036F TOBACCO NON-USER: CPT | Performed by: INTERNAL MEDICINE

## 2021-11-01 PROCEDURE — 4040F PNEUMOC VAC/ADMIN/RCVD: CPT | Performed by: INTERNAL MEDICINE

## 2021-11-01 PROCEDURE — G8484 FLU IMMUNIZE NO ADMIN: HCPCS | Performed by: INTERNAL MEDICINE

## 2021-11-01 PROCEDURE — 1123F ACP DISCUSS/DSCN MKR DOCD: CPT | Performed by: INTERNAL MEDICINE

## 2021-11-01 PROCEDURE — G8427 DOCREV CUR MEDS BY ELIG CLIN: HCPCS | Performed by: INTERNAL MEDICINE

## 2021-11-01 PROCEDURE — 99213 OFFICE O/P EST LOW 20 MIN: CPT | Performed by: INTERNAL MEDICINE

## 2021-11-01 PROCEDURE — G8417 CALC BMI ABV UP PARAM F/U: HCPCS | Performed by: INTERNAL MEDICINE

## 2021-11-01 ASSESSMENT — ENCOUNTER SYMPTOMS
RESPIRATORY NEGATIVE: 1
EYES NEGATIVE: 1
GASTROINTESTINAL NEGATIVE: 1
ALLERGIC/IMMUNOLOGIC NEGATIVE: 1

## 2021-11-01 NOTE — PROGRESS NOTES
Neck Circumference -16.75     Mallampati -2     Lung Nodule Screening     [] Qualifies    [x] Does not qualify   [] Declined    [] Completed

## 2021-11-01 NOTE — PROGRESS NOTES
Subjective:      Patient ID: Sandra Guo is a 80 y.o. male. CC: Mediastinal LAD    HPI     EBUS completed with sampling of subcarinal LN (station 7) which reveals normal lymphocytes, no evidence of abnormal plasma cells or other malignancy. Multiple passes taken, good lymphoid samples obtained. BAL CD4/CD8 ratio 2.19   Fungal/AFBs cultures negative. BAL cultures grew Serratia but no evidence of acute or chronic bacterial infections---will not treat    Findings discussed with Vania Vega and his wife        JERARDO CRAMERARNOL SANCHEZ II.[facility]     AMAYA Martin                 [id number]   Assoc.                                              Page 1 of 3 [phone] II.[place], [place] [address]                                                       PROC: 10/22/2021   [facility]                                    RECV: 10/25/2021   [address]                                    RPTD: 10/26/2021   JERARDO ROONEYENECHRISTOPHER II.[place], [place] [address]                       MRN:  [id number]    LOC: EN                       ACCT: [de-identified]  SEX: M                       : 1940  AGE: 80 Y                          PATHOLOGY REPORT                       ATTN: SUZANNE MCCOLLUM                       REQ: SUZANNE MCCOLLUM         Clinical Information:  MEDIASTINAL AND CERVICAL LYMPHADENOPATHY       RAPID ONSITE EVALUATION:  B.  Lymph node, station 7, fine-needle   aspirate: #1-3-blood, benign bronchial cells.  #4-5-limited lymphoid   sample.  #6-8-blood.  #9-benign bronchial cells.  #10-11-lymphoid   sample.  #12-blood. SIO. FINAL RESULTS:    A.  Bronchoalveolar lavage:                  No malignant cells seen.                  Alveolar macrophages.                 B.  Lymph node, station 7, fine-needle aspirate:                  No malignant cells seen.                  Benign appearing lymphoid sample. Continues asymptomatic, lump in left neck has resolved, denies systemic symptoms        Previous notes    Vania Vega is referred by Dr Laury Tillman for EBUS.   Developed asymptomatic enlargement of station II L cervical LN while in [place], had excision (8/24/21): ---Plasma cell neoplasm    W/U include - PET-CT revealing FDG avid mediastinal LNs: 1.9 cm subcarinal node max SUV 4.9, non enlarged L hilar LN max SUV 3.2, also 1 cm L inguinal LN max SUV 4.6 cm  -Bone marrow biopsy: not revealing    PET-CT also shows extensive calcified mediastinal LAD and calcified parenchymal granulomas. Smoked 1/2 ppd from 5 to 1992. 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 CAD PCI 2 stents, carotid stent placement  On Plavix and ASA. No personal h/o cancer    Feels good, denies any systemic or local symptoms    Review of Systems   Constitutional: Negative. HENT: Negative. Eyes: Negative. Respiratory: Negative. Cardiovascular: Negative. Gastrointestinal: Negative. Endocrine: Negative. Musculoskeletal: Negative. Allergic/Immunologic: Negative. Neurological: Negative.           All other systems reviewed and are negative    Lung Nodule Screening     [] Qualifies    [] Does not qualify   [] Declined   [] Completed  Past Medical History:   Diagnosis Date    CAD (coronary artery disease)     Enlarged lymph nodes     Heart disease     Hyperlipidemia     Hypertension      Past Surgical History:   Procedure Laterality Date    ABSCESS DRAINAGE      SPIDER BITE    BRONCHOSCOPY N/A 10/22/2021    BRONCHOSCOPY W/EBUS FNA performed by Guanako Kapoor MD at [place] [address]  2004    COLONOSCOPY      CT BONE MARROW BIOPSY  9/29/2021    CT BONE MARROW BIOPSY 9/29/2021 [facility] CT SCAN    HERNIA REPAIR  05/18/2016     Social History     Tobacco Use    Smoking status: Former Smoker     Packs/day: 1.00     Years: 37.00     Pack years: 37.00     Types: Cigarettes     Start date: 9/23/1964     Quit date: 9/23/2001     Years since quittin.1    Smokeless tobacco: Never Used   Substance Use Topics    Alcohol use: Yes     Comment: beer every so often     Drug use: Never      No Known Allergies   Family History   Problem Relation Age of Onset    Arthritis Mother     Heart Disease Father      Current Outpatient Medications   Medication Sig Dispense Refill    acetaminophen (TYLENOL) 325 MG tablet Take 650 mg by mouth every 6 hours as needed for Pain      OMEPRAZOLE PO Take by mouth      cetirizine (ZYRTEC) 10 MG tablet Take 10 mg by mouth daily      Multiple Vitamins-Minerals (THERAPEUTIC MULTIVITAMIN-MINERALS) tablet Take 1 tablet by mouth daily      pravastatin (PRAVACHOL) 80 MG tablet Take 80 mg by mouth daily.  metoprolol (LOPRESSOR) 25 MG tablet Take 25 mg by mouth 2 times daily.  lisinopril (PRINIVIL;ZESTRIL) 5 MG tablet Take 5 mg by mouth daily.  aspirin 81 MG tablet Take 81 mg by mouth daily. No current facility-administered medications for this visit. LABS - none   /68 (Site: Left Upper Arm, Position: Sitting, Cuff Size: Medium Adult)   Pulse 72   Temp 97.5 °F (36.4 °C) (Temporal)   Ht 5' 10\" (1.778 m)   Wt 203 lb (92.1 kg)   SpO2 96% Comment: r/a  BMI 29.13 kg/m²    Wt Readings from Last 3 Encounters:   21 203 lb (92.1 kg)   10/22/21 201 lb 12.8 oz (91.5 kg)   10/15/21 204 lb (92.5 kg)     Neck Circumference - 16.75 inches   Mallampati Score - 2        Objective:   Physical Exam  Constitutional:       Appearance: Normal appearance. HENT:      Head: Normocephalic. Nose: Nose normal.      Mouth/Throat:      Mouth: Mucous membranes are moist.   Eyes:      Pupils: Pupils are equal, round, and reactive to light. Cardiovascular:      Rate and Rhythm: Normal rate and regular rhythm. Heart sounds: Normal heart sounds. Pulmonary:      Effort: Pulmonary effort is normal.      Breath sounds: Normal breath sounds. Abdominal:      General: Abdomen is flat.  Bowel sounds are normal.   Musculoskeletal:         General: Normal range of motion. Cervical back: Normal range of motion and neck supple. Skin:     Capillary Refill: Capillary refill takes less than 2 seconds. Findings: Rash:      Neurological:      Mental Status: He is alert. BAL culture;    AFB Culture & Smear 10/22/2021  2:00  [address]   SEE BELOW    Comment:   Specimen Description       SEE BELOW   BRONCHIAL ALVEOLAR LAVAGE   Direct Exam                SEE BELOW   NO ACID FAST BACILLI SEEN (CONCENTRATED SMEAR)   Culture                    SEE BELOW   NO GROWTH 8 DAYS   [employer] [address] [facility], [address] [place] [address] [phone]   Report Status                PENDING          FINDINGS:        Neck: Confusion confluent left-sided level 2 cervical lymph nodes measure 1.2 cm in short axis diameter and are associated with a maximum SUV of 2.3 (image 50).     Chest: Cardiac size is normal. Atherosclerotic calcifications are present in the thoracic aorta and coronary arteries. 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 There is no pleural or    pericardial effusion. Calcified granulomas are present in the bilateral lungs. There is scarring at the bilateral lung apices. An indistinct 0.9 cm density in the left lung apex has a maximum SUV of 1.8 (image 94). Minimal alveolar opacities at the    bilateral lung bases are likely infectious or inflammatory. There are multiple FDG avid mediastinal lymph nodes. A representative subcarinal lymph node measures 1.9 cm in short axis diameter and has a maximum SUV of 4.9 (image 115). A nonenlarged left    hilar lymph node has a maximum SUV of 3.2 (image 108). There is no FDG avid axillary lymphadenopathy. Activity associated with a small hiatal hernia may be infectious or inflammatory.  Degenerative changes are present in the thoracic spine without    evidence of hypermetabolic osseous metastatic disease. Periarticular activity at the bilateral shoulders is likely degenerative.       Abdomen/pelvis: Physiologic activity is present in the liver, spleen, urinary collecting system and gastrointestinal tract. There is a calcification in the lumen of the gallbladder. There is mild fatty replacement of the pancreas. Atherosclerotic    calcific lesions are present in the abdominal aorta without evidence of aneurysm. There are diverticula in sigmoid colon without evidence of diverticulitis. The prostate gland is enlarged and contains calcifications. There is no FDG avid mesenteric,    retroperitoneal or pelvic lymphadenopathy. A left inguinal lymph node which measures 1.0 cm in short axis diameter has a maximum SUV of 4.6 (image 268). Degenerative changes are seen in the lumbar spine and pelvis without evidence of hypermetabolic    osseous metastatic disease.           Impression   1. Confluent mildly FDG avid left cervical lymphadenopathy which likely corresponds to known malignancy. 2. FDG avid mediastinal, left hilar and left inguinal lymphadenopathy suspicious for metastatic disease. 3. Small, mildly FDG avid density at the left lung apex of indeterminant etiology. Malignancy cannot be excluded.       Final report electronically signed by Dr. David Rios on 10/7/2021 11:13 AM           Assessment:      I suspect the calcified mediastinal LAD/parenchymal granulomas are related to occupational exposure to concrete dust, EBUS cytology from subcarinal LN reveals normal lymphocytes. Extramedullary plasmacytoma.       Plan:      Keep upcoming appointment with Dr Kathy Yanes to discuss how to proceed  RTC in April with CT chest.    Orders Placed This Encounter   Procedures    CT CHEST WO CONTRAST     Standing Status:   Future     Standing Expiration Date:   11/1/2022     Order Specific Question:   Reason for exam:     Answer:   Lung Nodules

## 2021-11-03 ENCOUNTER — HOSPITAL ENCOUNTER (OUTPATIENT)
Dept: INFUSION THERAPY | Age: 81
Discharge: HOME | End: 2021-11-03
Payer: MEDICARE

## 2021-11-03 ENCOUNTER — OFFICE VISIT (OUTPATIENT)
Dept: ONCOLOGY | Age: 81
End: 2021-11-03
Payer: MEDICARE

## 2021-11-03 VITALS
HEART RATE: 74 BPM | DIASTOLIC BLOOD PRESSURE: 82 MMHG | RESPIRATION RATE: 16 BRPM | WEIGHT: 206.8 LBS | OXYGEN SATURATION: 96 % | BODY MASS INDEX: 29.6 KG/M2 | SYSTOLIC BLOOD PRESSURE: 168 MMHG | HEIGHT: 70 IN | TEMPERATURE: 98.1 F

## 2021-11-03 DIAGNOSIS — C90.20 EXTRAMEDULLARY PLASMACYTOMA NOT HAVING ACHIEVED REMISSION (HCC): Primary | ICD-10-CM

## 2021-11-03 PROCEDURE — 4040F PNEUMOC VAC/ADMIN/RCVD: CPT | Performed by: INTERNAL MEDICINE

## 2021-11-03 PROCEDURE — G8417 CALC BMI ABV UP PARAM F/U: HCPCS | Performed by: INTERNAL MEDICINE

## 2021-11-03 PROCEDURE — G8484 FLU IMMUNIZE NO ADMIN: HCPCS | Performed by: INTERNAL MEDICINE

## 2021-11-03 PROCEDURE — 99211 OFF/OP EST MAY X REQ PHY/QHP: CPT

## 2021-11-03 PROCEDURE — 1123F ACP DISCUSS/DSCN MKR DOCD: CPT | Performed by: INTERNAL MEDICINE

## 2021-11-03 PROCEDURE — G8427 DOCREV CUR MEDS BY ELIG CLIN: HCPCS | Performed by: INTERNAL MEDICINE

## 2021-11-03 PROCEDURE — 99214 OFFICE O/P EST MOD 30 MIN: CPT | Performed by: INTERNAL MEDICINE

## 2021-11-03 PROCEDURE — 1036F TOBACCO NON-USER: CPT | Performed by: INTERNAL MEDICINE

## 2021-11-03 NOTE — PATIENT INSTRUCTIONS
Fu 2 wk w me   Need the 10/22 Bronch/ebus Final path/cytology report please. Consult [facility].

## 2021-11-03 NOTE — PROGRESS NOTES
[address] CANCER [address] [place] [address]  Dept: [phone]  Loc: [phone]   Hematology/Oncology Consult (Clinic)        11/3/2021    Mike Campos   1940     No ref. provider found [facility]  Shay Bergeron DO PCP is in [place]. 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 Bone marrow biopsy-uninvolved. IgA 259/normal [id number]/minimally elevated, IgM 93/normal  Beta-2 microglobulin 3.1/mildly elevated  Serum immunofixation shows normal pattern with no monoclonal proteins. Serum free kappa light chains 84.82, lambda 37.03, ratio elevated at 2.29 (0.26-1.65)    -Abnormal PET scan (10/7/2021):  with multiple FDG avid mediastinal nodes. Representative subcarinal node 1.9 cm short axis with SUV of 4.9. Nonenlarged left hilar node with SUV of 3.2. Left inguinal node measuring 1 cm short axis with SUV of 4.6. Not palpable on exam.  Indeterminate 9 mm left apical lung nodule. No bone lesions. 10/22/2021 Dr. Chikis Mejia EBUS-unremarkable although final cytology from station 7 core biopsy pending     -Indeterminate 9 mm left apex lung nodule mildly FDG avid with SUV of 1.8. TREATMENT:  -If solitary plasmacytoma confirmed he will receive radiation to that site.  -Need [facility] EBUS for histology of the mediastinal nodes. Subcarinal node likely the best target. Path pending. Depending on these results he may need thoracic surgery for mediastinoscopy/lymph node biopsy. PARAMETERS:  -PET/CT 10/7/2021  -Left neck exam; node excised. -Myeloma parameters ; serum light chains with elevated ratio. SUBJECTIVE: Patient is asymptomatic and feels great.   Here for results of his recent [facility] EBUS. Cytology unfortunately is still pending from the subcarinal node biopsy. HPI: 79 yo male with L neck mass with recent [facility] bx reveaaling a plasma cell neoplasm/ possible plasmacytoma. Patient cancelled heme appt  due on  as he wanted all care closer to home. Otolaryngology care per Dr Roland De Jesus at [address] [place] [address]. Patient had  Covid on , outpt mngmt and recovered. April noted lump L neck under the jaw. Since core bx has been done, he states the mass is much smaller. Since April ,legs a bit weaker bilaterally but no pain except in knees. Overall describes a sense of wellbeing is stable and unchanged. Denies any drenching sweats fevers or weight loss. Denies any midline back pain or pain in his upper arms or legs. Denies any frequent or severe infections. He had Covid vaccination done in April. ROS:  Review of Systems 14 point negative except as detailed above. PMH:   Past Medical History:   Diagnosis Date    CAD (coronary artery disease)     Enlarged lymph nodes     Heart disease     Hyperlipidemia     Hypertension     Stents x 2 . No MI,angina ,chf  Denies history of diabetes or stroke.     Social HX:   Social History     Socioeconomic History    Marital status:      Spouse name: Not on file    Number of children: Not on file    Years of education: Not on file    Highest education level: Not on file   Occupational History    Not on file   Tobacco Use    Smoking status: Former Smoker     Packs/day: 1.00     Years: 37.00     Pack years: 37.00     Types: Cigarettes     Start date: 1964     Quit date: 2001     Years since quittin.1    Smokeless tobacco: Never Used   Substance and Sexual Activity    Alcohol use: Yes     Comment: beer every so often     Drug use: Never    Sexual activity: Not on file   Other Topics Concern    Not on file   Social History Narrative    Not on file     Social Determinants of Health     Financial Resource Strain:     Difficulty of Paying Living Expenses:    Food Insecurity:     Worried About Running Out of Food in the Last Year:     [address] in the Last Year:    Transportation Needs:     Lack of Transportation (Medical):  Lack of Transportation (Non-Medical):    Physical Activity:     Days of Exercise per Week:     Minutes of Exercise per Session:    Stress:     Feeling of Stress :    Social Connections:     Frequency of Communication with Friends and Family:     Frequency of Social Gatherings with Friends and Family:     Attends Catholic Services:     Active Member of Clubs or Organizations:     Attends Club or Organization Meetings:     Marital Status:    Intimate Partner Violence:     Fear of Current or Ex-Partner:     Emotionally Abused:     Physically Abused:     Sexually Abused:       [address] [facility] [address]. 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 SpouseZackary Sep    Phone: [phone] [facility] [address]     Employment:  Retired [employer]    Immunizations: There is no immunization history on file for this patient. Covid and pneumonia vaccine    Health Screenings:    C-Scope: 15 yrs  Prostate: ok          Health Maintenance Due   Topic Date Due    Hepatitis C screen  Never done    Lipid screen  Never done    COVID-19 Vaccine (1) Never done    DTaP/Tdap/Td vaccine (1 - Tdap) Never done    Shingles Vaccine (1 of 2) Never done    Pneumococcal 65+ yrs at Risk Vaccine (1 of 2 - PCV13) Never done    Flu vaccine (1) Never done   [employer] Visit (AWV)  Never done        Interests:   Not reviewed    Fam HX:   No Cancer hx    Hospitalizations:   None recent    Allergies:  No Known Allergies     Adult Illness: There is no problem list on file for this patient.    see above    Surgery:  Past Surgical History:   Procedure Laterality Date    ABSCESS DRAINAGE      SPIDER BITE    BRONCHOSCOPY N/A 10/22/2021 BRONCHOSCOPY W/EBUS FNA performed by Dorian Edward [facility] at [facility] 10      COLONOSCOPY      CT BONE MARROW BIOPSY  2021    CT BONE MARROW BIOPSY 2021 [facility] CT SCAN    HERNIA REPAIR  2016        Medications:  Current Outpatient Medications   Medication Sig Dispense Refill    acetaminophen (TYLENOL) 325 MG tablet Take 650 mg by mouth every 6 hours as needed for Pain      OMEPRAZOLE PO Take by mouth      cetirizine (ZYRTEC) 10 MG tablet Take 10 mg by mouth daily      Multiple Vitamins-Minerals (THERAPEUTIC MULTIVITAMIN-MINERALS) tablet Take 1 tablet by mouth daily      pravastatin (PRAVACHOL) 80 MG tablet Take 80 mg by mouth daily.  metoprolol (LOPRESSOR) 25 MG tablet Take 25 mg by mouth 2 times daily.  lisinopril (PRINIVIL;ZESTRIL) 5 MG tablet Take 5 mg by mouth daily.  aspirin 81 MG tablet Take 81 mg by mouth daily. No current facility-administered medications for this visit. EXAM:    BP (!) 168/82 (Site: Right Upper Arm, Position: Sitting, Cuff Size: Medium Adult)   Pulse 74   Temp 98.1 °F (36.7 °C) (Oral)   Resp 16   Ht 5' 10\" (1.778 m)   Wt 206 lb 12.8 oz (93.8 kg)   SpO2 96%   BMI 29.67 kg/m²      ECO  General: Non-ill appearing. Appears younger than his stated age. HEENT: NC/AT,nonicteric, perrla,eom intact, no mucosal lesions, mildly hard of hearing. Neck: normal thyroid, no masses. pulses nl, no bruits,   Nodes: Left neck at the site of the core biopsy of the left mid mandible shows a vague fullness persisting. Lung: No wheezing , clear  CV: rrr, no rubs ,gallops or murmurs  Breasts: normal exam  Abd/Rectal: soft, non-tender,bowel sounds normal , no HSM,no masses  Back: normal curvature, No midline tenderness. flanks nontender  : Not Examined  Extremities: no cyanosis,clubbing or edema.   Skin: unremarkable  Neuro: A and O x 4, CN exam nonfocal, Motor- no deficits, Sensory- no deficits, gait-nl, or inflammatory. Degenerative changes are present in the thoracic spine without    evidence of hypermetabolic osseous metastatic disease. Periarticular activity at the bilateral shoulders is likely degenerative.       Abdomen/pelvis: Physiologic activity is present in the liver, spleen, urinary collecting system and gastrointestinal tract. There is a calcification in the lumen of the gallbladder. There is mild fatty replacement of the pancreas. Atherosclerotic    calcific lesions are present in the abdominal aorta without evidence of aneurysm. There are diverticula in sigmoid colon without evidence of diverticulitis. The prostate gland is enlarged and contains calcifications. There is no FDG avid mesenteric,    retroperitoneal or pelvic lymphadenopathy. A left inguinal lymph node which measures 1.0 cm in short axis diameter has a maximum SUV of 4.6 (image 268). Degenerative changes are seen in the lumbar spine and pelvis without evidence of hypermetabolic    osseous metastatic disease.           Impression   1. Confluent mildly FDG avid left cervical lymphadenopathy which likely corresponds to known malignancy. 2. FDG avid mediastinal, left hilar and left inguinal lymphadenopathy suspicious for metastatic disease. 3. Small, mildly FDG avid density at the left lung apex of indeterminant etiology.  Malignancy cannot be excluded.       Final report electronically signed by Dr. 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 Normal airways. Mediastinal lymphadenopathy. Complete ultrasound lymph node exam performed including stations 2, 4, 7, 10 and 11. Station 7 lymph nodes were heterogeneous in appearance, irregular and enlarged with calcifications. 12 passes on station 7. Airways are normal.  Abnormal appearance of station 7 lymph nodes, normal lymphocytes seen no malignancy identified. FINAL RESULTS:    A.  Bronchoalveolar lavage:                  No malignant cells seen.                  Alveolar macrophages.                   B.  Lymph node, station 7, fine-needle aspirate:                  No malignant cells seen.                  Benign appearing lymphoid sample. Lymph node bx [facility] 8/24/21  Case Report   Surgical Pathology Report                         Case: [id number]                                   Authorizing Provider: Mariah Gonzalez MD              Collected:           08/24/2021 03:19 PM           Ordering Location:     [facility]               Received:            08/24/2021 04:27 PM           Pathologist:           Silvana Canales MD                                                             Specimens:   A) - SURG PATH, left neck level II LN for lymphoma work up                                          B) - SURG PATH, left neck level II LN                                                       Clinical History      Preop Diagnosis:  Please perform core biopsy of left level II lymph node - will need to send tissue for flow for workup for possible lymphoma. Associated Diagnosis:  Follow-up, Z09. Pathologic Diagnosis      A.  Lymph node, left neck level II, biopsy:  · Plasma cell neoplasm, see comment.         Electronically signed by Silvana Canales MD on 8/27/2021 at 11:54 AM    Diagnosis Comments      The biopsy shows effaced lymph node with infiltrations of sheets of mature plasma cells admixed with small lymphocytes. Touch print shows similar morphology. The plasma cells are positive for YvUE03y,  and is Kappa+ light chain restricted (Janessa Sandra). They are negative for CD5, Cyclin D1, Cd20, Cd117, and Lambda-WOJCIECH. The background small lymphocytes are positive for T cells (30%), and a fewer B-cells (10%). The Ki67 show approx. 10-20% positivity and most are those reactive T-cells. The findings are consistent with Plasmacytoma. A differential diagnosis of lymphoplasmacytic lymphoma or marginal zone lymphoma is considered but given the background of few B-cells these findings are unlikely. Correlation with IgH and molecular study is recommended. Correlation with systemic examination and serum test is recommended to rule out multiple myeloma.      All controls show appropriate reactivity.     All immunohistochemistry, in situ hybridization, and histochemical tests were developed by and are performed at the [address] [place] [address], Luiza Gregory [address] [place] [address], [address] [place] [address]. All tests reported here, except those addressing HER2 overexpression as a predictive marker, have not been cleared by or approved by the [employer] and Drug Administration (FDA). The laboratory is regulated under CLIA as qualified to perform high-complexity testing. The tests are used for clinical purposes. They should not be regarded as investigational or for research.          Addendum      IHC for CD3 highlights T-cells similar to those of Cd5. MUM1 are positive on the plasma cells similar to those of Cd138. The Lambda-WOJCIECH show few plasma cells and K:L ratio>20:1.   The findings confirm the final diagnosis.      All controls show appropriate reactivity.     All immunohistochemistry, in situ hybridization, and histochemical tests were developed by and are performed at the [facility], Luiza Gregory [address] [place] [address], [address] [place] [address]. All tests reported here, except those addressing HER2 overexpression as a predictive marker, have not been cleared by or approved by the [employer] and Drug Administration (FDA). The laboratory is regulated under CLIA as qualified to perform high-complexity testing. The tests are used for clinical purposes. They should not be regarded as investigational or for research. Addendum electronically signed by Marixa Espino MD on 8/31/2021 at  9:28 AM    Addendum 2      IgH PCR shows predominant monoclonal patterns, consistent with the presence of a clonalm B-cell or plasma cell population. The final diagnosis with differential diagnosis remains unchanged. Addendum electronically signed by Marixa Espino MD on 9/2/2021 at  8:12 AM    Microscopic Description      A microscopic examination was performed. Synoptic report  Specimen Site: Lymph node, left neck level II  Procedure: Biopsies  Diagnosis:  Plasma cell neoplasm, see comment.     Microscopic description: The H&E section reveal a completely effaced lymphoid tissue with diffuse infiltrate with predominantly plasma cells admixed with small lymphoid cells. There are no large atypical lymphocytes present.      --Ancillary Studies--  Flow cytometry: Not performed  Molecular studies: Submitted for IgH PCR and result will be followed in the addendum. Special stains: Not performed  Immunohistochemistry (IHC)/in situ hybridization (WOJCIECH):     Neoplastic cells are positive for: CD19, , and Kappa WOJCIECH and plasma cell antigen (Vs38C). Neoplastic cells are negative for: CD5, CD10, CD20, PAX5, BCL1, Lambda WOJCIECH, and Ki67 (10% by manual quantification method)  Other IHC findings: see comment. IHC for Kappa and Lambda is unremarkable on both B and plasma cells. CD10 is virtually negative on the biopsy.  CD20 and PAX5 show a few B-cell clusters (<10%) and they are negative for Cd5, Cd10, and BCL1.       HEME Mutation Panel  Inactivating and splice site TET2 mutations detected. No MYD88, CARD11, CXCR4, KLF2, NOTCH1, NOTCH2 or any other mutations associated wtih specific lymphoma types are noted.     en cleared by or approved by the [employer] and Drug Administration (FDA). The laboratory is regulated under CLIA as qualified to perform high-complexity testing. The tests are used for clinical purposes. They should not be regarded as investigational or for research. Images          For Immediate Release to Patient's ThingWorxt? Yes  NoAbnormal         [address]   Lab and Collection        PATHOLOGY:   Clinical History 7/22/21      Mass left parotid. *CYTOLOGIC DIAGNOSIS*      A. LEFT PAROTID MASS, FNA (CYTOLOGY AND CELL BLOCK):      FINAL DIAGNOSIS:  · Non Diagnostic  · Normal Salivary Gland Elements Only        FNA Performed By:  Clinician       Electronically signed by Jasen Rosales MD on 7/23/2021 at  4:57 PM      Clinical Information: EXTRAMEDULLARY PLASMACYTOMA NOT HAVING ACHIEVED   REMISSION, C90.20      9/29/21    FINAL DIAGNOSIS:   Bone marrow core, clot, and aspirate smear, site unspecified:    Normocellular bone marrow (30-40%) with orderly trilineage   hematopoiesis      and 2% polyclonal mature plasma cells.    No significant morphologic or flow cytometric evidence of a monoclonal       plasma cell neoplasm.    Adequate iron stores.    Peripheral blood: mild anemia. Bronch/EBUS Dr Alexander Baker 10/22/21    GENETICS:  See above    MOLECULAR:  See above    ASSESSMENT/PLAN:    1: Diagnosis: 70-year-old gentleman with a left neck mass on core biopsy at [facility] favors a plasmacytoma. Patient needs thorough evaluation to rule out myeloma. He is asymptomatic. Bone marrow biopsy is uninvolved histologically and by flow cytometry.   PET scan does not show findings suggestive of myeloma in particular no bone lesions. The findings of mediastinal node uptake is unexpected and needs [facility] EBUS to work this up further. Indeterminate 9 mm left apical lesion as well. [facility] EBUS report reviewed and showed normal airways nodes unremarkable other than the station 7 subcarinal node. Both the bronchoalveolar lavage and the needle aspiration of the subcarinal node show no malignancy. Therefore, refer to radiation oncology for treatment of what appears to be an isolated left neck plasmacytoma. 2) Prognosis / Disease Status: Excellent of a solitary plasmacytoma. Definition will require less than 10% plasma cells in the marrow. Depending the results of his mediastinal lymph node subcarinal biopsy he may and be referred for radiation to the extra medullary plasmacytoma in the neck. Soft tissue plasmacytomas have a higher cure rate than those involving bone. 3) Work-up:    Labs: Reviewed above. None drawn today. Imaging: PET/CT reviewed with radiology today. Procedures: Bone marrow aspiration biopsy for ruling out myeloma. Results reviewed and are negative. Consults: Dr. David Mckeon pulmonary for bronchoscopy EBUS specifically to biopsy the subcarinal node. This was done 10/22. See above. Airways normal lymph nodes normal other than station 7. Station 7 biopsy/cytology benign and BAL benign. If negative choices include follow-up chest CT imaging in 2 months versus mediastinoscopy. Preference simply to repeat the chest CT in 2 months. Other: Light chain MGUS. 4) Symptom Management: None needed      5) Supportive care provided. Level of care is appropriate. Teaching done today. Reviewed the differential plasmacytoma. Reviewed the findings on the PET scan. It is my opinion at this point that the mediastinal nodes are not plasmacytomas as this would be quite unusual.      6) Treatment goal: Cure      Treatment plan:     Radiation oncology referral regarding the left neck plasmacytoma. Follow-up imaging in the mediastinum in 2 months versus mediastinoscopy. 7) Medications reviewed. Prescriptions today: None            No orders of the defined types were placed in this encounter. OARRS:  Controlled Substance Monitoring:    Acute and Chronic Pain Monitoring:   No flowsheet data found. 8) Research Options:   None at this time      9) Other:        None    10) Follow Up: Follow-up with me in 2 weeks.       Phani Lujan MD

## 2021-11-10 ENCOUNTER — HOSPITAL ENCOUNTER (OUTPATIENT)
Dept: RADIATION ONCOLOGY | Age: 81
Discharge: HOME | End: 2021-11-10
Payer: MEDICARE

## 2021-11-10 VITALS
DIASTOLIC BLOOD PRESSURE: 79 MMHG | OXYGEN SATURATION: 96 % | RESPIRATION RATE: 18 BRPM | TEMPERATURE: 98.7 F | SYSTOLIC BLOOD PRESSURE: 165 MMHG | HEART RATE: 79 BPM

## 2021-11-10 DIAGNOSIS — C90.20 EXTRAMEDULLARY PLASMACYTOMA, UNSPECIFIED WHETHER REMISSION ACHIEVED (HCC): ICD-10-CM

## 2021-11-10 PROCEDURE — 99202 OFFICE O/P NEW SF 15 MIN: CPT | Performed by: RADIOLOGY

## 2021-11-10 PROCEDURE — 99203 OFFICE O/P NEW LOW 30 MIN: CPT | Performed by: RADIOLOGY

## 2021-11-10 NOTE — PROGRESS NOTES
Sandra Guo  11/10/2021    Chaperone: No    Advance Directives     Power of  Living Will ACP-Advance Directive ACP-Power of     Not on File Not on File Not on File Not on File          Vitals:    11/10/21 1440   BP: (!) 165/79   Pulse: 79   Resp: 18   Temp: 98.7 °F (37.1 °C)   SpO2: 96%       Wt Readings from Last 1 Encounters:   11/03/21 206 lb 12.8 oz (93.8 kg)        Lab Results   Component Value Date    CREATININE 1.4 (H) 09/29/2021     Lab Results   Component Value Date    BUN 23 (H) 09/23/2021       Mediport: no    Pacemaker/ICD: no    Previous XRT: no    Past Medical History:   Diagnosis Date    CAD (coronary artery disease)     Enlarged lymph nodes     Heart disease     Hyperlipidemia     Hypertension     Plasmacytoma ([address] 2021     Past Surgical History:   Procedure Laterality Date    ABSCESS DRAINAGE      SPIDER BITE    BRONCHOSCOPY N/A 10/22/2021    BRONCHOSCOPY W/EBUS FNA performed by Jose Viramontes MD at [place] [address]  2004    COLONOSCOPY      CT BONE MARROW BIOPSY  9/29/2021    CT BONE MARROW BIOPSY 9/29/2021 [facility] CT SCAN    HERNIA REPAIR  05/18/2016       No Known Allergies       Current Outpatient Medications:     acetaminophen (TYLENOL) 325 MG tablet, Take 650 mg by mouth every 6 hours as needed for Pain, Disp: , Rfl:     OMEPRAZOLE PO, Take by mouth, Disp: , Rfl:     cetirizine (ZYRTEC) 10 MG tablet, Take 10 mg by mouth daily, Disp: , Rfl:     Multiple Vitamins-Minerals (THERAPEUTIC MULTIVITAMIN-MINERALS) tablet, Take 1 tablet by mouth daily, Disp: , Rfl:     pravastatin (PRAVACHOL) 80 MG tablet, Take 80 mg by mouth daily. , Disp: , Rfl:     metoprolol (LOPRESSOR) 25 MG tablet, Take 25 mg by mouth 2 times daily. , Disp: , Rfl:     lisinopril (PRINIVIL;ZESTRIL) 5 MG tablet, Take 5 mg by mouth daily. , Disp: , Rfl:     aspirin 81 MG tablet, Take 81 mg by mouth daily. , Disp: , Rfl:       ADDITIONAL COMMENTS: Patient here for consult with Dr. Marito Tanner.        Narciso Devine RN BSN

## 2021-11-10 NOTE — PROGRESS NOTES
[address] [facility] [address], [address]        [facility], [address] [facility]        Ananda Fisher: [phone]        F: [phone]       [email or web address]            INITIAL CONSULTATION    Date of Service: 11/10/2021  Patient ID: Vaishnavi French   : 1940  MRN: [id number]   Acct Number: [de-identified]         Requesting Provider: Dr. Yesi Griffin [facility])  Reason for request: Evaluation for the potential role of definitive radiation therapy. CONSULTANT: Damian Nava MD    CHIEF COMPLAINT: Evaluation for the potential role of definitive radiation therapy. 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 Possible long-term side effects discussed included hyperpigmentation of the skin, increased firmness of the tissues of the neck, permanent dryness of the mouth, permanent change in taste, and damage to the bone (causing necrosis). Patient presents today doing well overall. We discussed his most recent pathologic and imaging findings. At present patient does not have any clinical features of disease in the left neck. Review of recent PET CT scan demonstrating mild lymphadenopathy in the left neck with mild avidity. 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   I will further discuss his plan of care with Dr. Williams Luu 9:58 AM.       COMPARISON: None       FINDINGS:        Neck: Confusion confluent left-sided level 2 cervical lymph nodes measure 1.2 cm in short axis diameter and are associated with a maximum SUV of 2.3 (image 50). Chest: Cardiac size is normal. Atherosclerotic calcifications are present in the thoracic aorta and coronary arteries. 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 There is no pleural or    pericardial effusion. Calcified granulomas are present in the bilateral lungs. There is scarring at the bilateral lung apices. An indistinct 0.9 cm density in the left lung apex has a maximum SUV of 1.8 (image 94). Minimal alveolar opacities at the    bilateral lung bases are likely infectious or inflammatory. There are multiple FDG avid mediastinal lymph nodes. A representative subcarinal lymph node measures 1.9 cm in short axis diameter and has a maximum SUV of 4.9 (image 115). A nonenlarged left    hilar lymph node has a maximum SUV of 3.2 (image 108). There is no FDG avid axillary lymphadenopathy. Activity associated with a small hiatal hernia may be infectious or inflammatory. Degenerative changes are present in the thoracic spine without    evidence of hypermetabolic osseous metastatic disease. Periarticular activity at the bilateral shoulders is likely degenerative. Abdomen/pelvis: Physiologic activity is present in the liver, spleen, urinary collecting system and gastrointestinal tract. There is a calcification in the lumen of the gallbladder. There is mild fatty replacement of the pancreas. Atherosclerotic    calcific lesions are present in the abdominal aorta without evidence of aneurysm. There are diverticula in sigmoid colon without evidence of diverticulitis. The prostate gland is enlarged and contains calcifications. There is no FDG avid mesenteric,    retroperitoneal or pelvic lymphadenopathy.  A left inguinal lymph node which measures 1.0 cm in short axis diameter has a maximum SUV of 4.6 (image 268). Degenerative changes are seen in the lumbar spine and pelvis without evidence of hypermetabolic    osseous metastatic disease. Impression   1. Confluent mildly FDG avid left cervical lymphadenopathy which likely corresponds to known malignancy. 2. FDG avid mediastinal, left hilar and left inguinal lymphadenopathy suspicious for metastatic disease. 3. Small, mildly FDG avid density at the left lung apex of indeterminant etiology. Malignancy cannot be excluded. Final report electronically signed by Dr. John Hardwick on 10/7/2021 11:13 AM     PROCEDURES:   Bronch/EBUS 10/22/21  This included BAL of the right middle lobe. Normal airways. Mediastinal lymphadenopathy. Complete ultrasound lymph node exam performed including stations 2, 4, 7, 10 and 11. Station 7 lymph nodes were heterogeneous in appearance, irregular and enlarged with calcifications. 12 passes on station 7. Airways are normal.  Abnormal appearance of station 7 lymph nodes, normal lymphocytes seen no malignancy identified. FINAL RESULTS:    A.  Bronchoalveolar lavage:                  No malignant cells seen. Alveolar macrophages. B.  Lymph node, station 7, fine-needle aspirate:                  No malignant cells seen. Benign appearing lymphoid sample.       Lymph node bx [facility] 8/24/21  Case Report   Surgical Pathology Report                         Case: [id number]                                   Authorizing Provider:  Sera Dempsey MD              Collected:           08/24/2021 03:19 PM           Ordering Location:     [facility]               Received:            08/24/2021 04:27 PM           Pathologist:           Vera Barreto MD                                                             Specimens:   A) - SURG PATH, left neck level II LN for lymphoma work up for clinical purposes. They should not be regarded as investigational or for research. Addendum       IHC for CD3 highlights T-cells similar to those of Cd5. MUM1 are positive on the plasma cells similar to those of Cd138. The Lambda-WOJCIECH show few plasma cells and K:L ratio>20:1. The findings confirm the final diagnosis. All controls show appropriate reactivity. All immunohistochemistry, in situ hybridization, and histochemical tests were developed by and are performed at the [address] [place] [address], Luiza Gregory [address] [place] [address]. All tests reported here, except those addressing HER2 overexpression as a predictive marker, have not been cleared by or approved by the [employer] and Drug Administration (FDA). The laboratory is regulated under CLIA as qualified to perform high-complexity testing. The tests are used for clinical purposes. They should not be regarded as investigational or for research. Addendum electronically signed by Isabel Darden MD on 8/31/2021 at  9:28 AM     Addendum 2       IgH PCR shows predominant monoclonal patterns, consistent with the presence of a clonalm B-cell or plasma cell population. The final diagnosis with differential diagnosis remains unchanged. Addendum electronically signed by Isabel Darden MD on 9/2/2021 at  8:12 AM     Microscopic Description       A microscopic examination was performed. Synoptic report  Specimen Site: Lymph node, left neck level II  Procedure: Biopsies  Diagnosis:  Plasma cell neoplasm, see comment. Microscopic description: The H&E section reveal a completely effaced lymphoid tissue with diffuse infiltrate with predominantly plasma cells admixed with small lymphoid cells. There are no large atypical lymphocytes present. --Ancillary Studies--  Flow cytometry: Not performed  Molecular studies: Submitted for IgH PCR and result will be followed in the addendum.   Special stains: Not performed  Immunohistochemistry (IHC)/in situ hybridization (WOJCIECH):     Neoplastic cells are positive for: CD19, , and Kappa WOJCIECH and plasma cell antigen (Vs38C). Neoplastic cells are negative for: CD5, CD10, CD20, PAX5, BCL1, Lambda WOJCIECH, and Ki67 (10% by manual quantification method)  Other IHC findings: see comment. IHC for Kappa and Lambda is unremarkable on both B and plasma cells. CD10 is virtually negative on the biopsy. CD20 and PAX5 show a few B-cell clusters (<10%) and they are negative for Cd5, Cd10, and BCL1. HEME Mutation Panel  Inactivating and splice site TET2 mutations detected. No MYD88, CARD11, CXCR4, KLF2, NOTCH1, NOTCH2 or any other mutations associated wtih specific lymphoma types are noted. en cleared by or approved by the [employer] and Drug Administration (FDA). The laboratory is regulated under CLIA as qualified to perform high-complexity testing. The tests are used for clinical purposes. They should not be regarded as investigational or for research. 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 Ronaldo Santos         Mr. Welford Aschoff is a 80 y.o. male with above mentioned oncologic history presenting today for initial consultation regarding the potential role for radiation therapy. Patient presents today doing well overall with no new specific general complaints. Patient has no pain in the left neck, denies any lymphadenopathy as of recent. Patient tolerates normal diet well with no significant issues with eating drinking or swallowing. Patient has no pain at today's visit. Patient has no specific general complaints on a complete review of systems at this time. REVIEW OF SYSTEMS: AS per HPI above.      PHYSICAL EXAMINATION:   VITAL SIGNS: BP (!) 165/79   Pulse 79   Temp 98.7 °F (37.1 °C) (Infrared)   Resp 18   SpO2 96%     ECO - Asymptomatic (Fully active, able to carry on all pre-disease activities without restriction)    PAIN: 0/10    General: NAD, AO x 3, Mentation is clear with appropriate affect. HEENT: Normocephalic, atraumatic  HEENT:  No palpable lymphadenopathy noted in the cervical neck bilaterally.    Thorax:  Unlabored  COR: Regular rate and rhythm  Abdomen:  Soft, NT/ND, no rebound  Neuro:  Cranial nerves grossly intact; no focal deficits    Past Medical History:   Diagnosis Date    CAD (coronary artery disease)     Enlarged lymph nodes     Heart disease     Hyperlipidemia     Hypertension     Plasmacytoma ([address]         Past Surgical History:   Procedure Laterality Date    ABSCESS DRAINAGE      SPIDER BITE    BRONCHOSCOPY N/A 10/22/2021    BRONCHOSCOPY W/EBUS FNA performed by Ernestine Yeboah MD at [place] [address]      COLONOSCOPY      CT BONE MARROW BIOPSY  2021    CT BONE MARROW BIOPSY 2021 [facility] CT SCAN    HERNIA REPAIR  2016       Family History   Problem Relation Age of Onset    Arthritis Mother     Heart Disease Father        Social History     Socioeconomic History    Marital status:      Spouse name: Jaida Diego Number of children: 1    Years of education: Not on file    Highest education level: Not on file   Occupational History    Not on file   Tobacco Use    Smoking status: Former Smoker     Packs/day: 1.00     Years: 37.00     Pack years: 37.00     Types: Cigarettes     Start date: 1964     Quit date: 2001     Years since quittin.1    Smokeless tobacco: Never Used   Vaping Use    Vaping Use: Never used   Substance and Sexual Activity    Alcohol use: Not Currently     Comment: beer every so often     Drug use: Never    Sexual activity: Not on file   Other Topics Concern    Not on file   Social History Narrative    Not on file     Social Determinants of Health     Financial Resource Strain:     Difficulty of Paying Living Expenses: Not on file   Food Insecurity:     Worried About [address] [employer] in the Last Year: Not on file    Amor of Food in the Last Year: Not on file   Transportation Needs:     Lack of Transportation (Medical): Not on file    Lack of Transportation (Non-Medical): Not on file   Physical Activity:     Days of Exercise per Week: Not on file    Minutes of Exercise per Session: Not on file   Stress:     Feeling of Stress : Not on file   Social Connections:     Frequency of Communication with Friends and Family: Not on file    Frequency of Social Gatherings with Friends and Family: Not on file    Attends Cheondoism Services: Not on file    Active Member of [address] [place] [address] or Organizations: Not on file    Attends Club or Organization Meetings: Not on file    Marital Status: Not on file   Intimate Partner Violence:     Fear of Current or Ex-Partner: Not on file    Emotionally Abused: Not on file    Physically Abused: Not on file    Sexually Abused: Not on file   Housing Stability:     Unable to Pay for Housing in the Last Year: Not on file    Number of [place] in the Last Year: Not on file    Unstable Housing in the Last Year: Not on file       No Known Allergies     Current Outpatient Medications   Medication Sig Dispense Refill    acetaminophen (TYLENOL) 325 MG tablet Take 650 mg by mouth every 6 hours as needed for Pain      OMEPRAZOLE PO Take by mouth      cetirizine (ZYRTEC) 10 MG tablet Take 10 mg by mouth daily      Multiple Vitamins-Minerals (THERAPEUTIC MULTIVITAMIN-MINERALS) tablet Take 1 tablet by mouth daily      pravastatin (PRAVACHOL) 80 MG tablet Take 80 mg by mouth daily.  metoprolol (LOPRESSOR) 25 MG tablet Take 25 mg by mouth 2 times daily.  lisinopril (PRINIVIL;ZESTRIL) 5 MG tablet Take 5 mg by mouth daily.  aspirin 81 MG tablet Take 81 mg by mouth daily. No current facility-administered medications for this encounter.        No outpatient medications have been marked as taking for the 11/10/21 encounter [facility] Encounter) with Alan Guerrero MD.       LABORATORY STUDIES:   Onc labs:   Lab Results   Component Value Date     09/23/2021       PATHOLOGY: As per HPI above. RADIOLOGIC STUDIES: As per HPI above. Electronically signed by Mitchell Burnett MD on 11/10/21 at 4:30 PM EST     ATTESTATION: 30 minutes were spent with the patient at today's visit reviewing pertinent information related to their oncologic diagnosis, including any recent labs, imaging, follow ups and plan of care going forward.     CC:Dr. Arnaldo Rashid ([facility])   ACC:[facility]

## 2021-11-24 ENCOUNTER — HOSPITAL ENCOUNTER (OUTPATIENT)
Dept: RADIATION ONCOLOGY | Age: 81
Discharge: HOME | End: 2021-11-24
Attending: RADIOLOGY
Payer: MEDICARE

## 2021-11-24 ENCOUNTER — HOSPITAL ENCOUNTER (OUTPATIENT)
Dept: CT IMAGING | Age: 81
Discharge: HOME | End: 2021-11-24

## 2021-11-24 DIAGNOSIS — C90.20 EXTRAMEDULLARY PLASMACYTOMA NOT HAVING ACHIEVED REMISSION (HCC): ICD-10-CM

## 2021-11-24 PROCEDURE — 77334 RADIATION TREATMENT AID(S): CPT | Performed by: RADIOLOGY

## 2021-11-24 PROCEDURE — 3209999900 CT GUIDE RADIATION THERAPY NO CHARGE

## 2021-11-24 PROCEDURE — 77263 THER RADIOLOGY TX PLNG CPLX: CPT | Performed by: RADIOLOGY

## 2021-11-24 PROCEDURE — 77332 RADIATION TREATMENT AID(S): CPT | Performed by: RADIOLOGY

## 2021-11-24 PROCEDURE — 77290 THER RAD SIMULAJ FIELD CPLX: CPT | Performed by: RADIOLOGY

## 2021-12-06 LAB — AFB CULTURE & SMEAR: NORMAL

## 2021-12-08 ENCOUNTER — HOSPITAL ENCOUNTER (OUTPATIENT)
Dept: RADIATION ONCOLOGY | Age: 81
Discharge: HOME | End: 2021-12-08
Attending: RADIOLOGY
Payer: MEDICARE

## 2021-12-08 PROCEDURE — 77338 DESIGN MLC DEVICE FOR IMRT: CPT | Performed by: RADIOLOGY

## 2021-12-08 PROCEDURE — 77301 RADIOTHERAPY DOSE PLAN IMRT: CPT | Performed by: RADIOLOGY

## 2021-12-08 PROCEDURE — 77300 RADIATION THERAPY DOSE PLAN: CPT | Performed by: RADIOLOGY

## 2021-12-09 ENCOUNTER — HOSPITAL ENCOUNTER (OUTPATIENT)
Dept: RADIATION ONCOLOGY | Age: 81
End: 2021-12-09
Attending: RADIOLOGY
Payer: MEDICARE

## 2021-12-09 PROCEDURE — 77338 DESIGN MLC DEVICE FOR IMRT: CPT | Performed by: RADIOLOGY

## 2021-12-14 ENCOUNTER — HOSPITAL ENCOUNTER (OUTPATIENT)
Dept: RADIATION ONCOLOGY | Age: 81
Discharge: HOME | End: 2021-12-14
Attending: RADIOLOGY
Payer: MEDICARE

## 2021-12-14 DIAGNOSIS — C90.20 EXTRAMEDULLARY PLASMACYTOMA, UNSPECIFIED WHETHER REMISSION ACHIEVED (HCC): Primary | ICD-10-CM

## 2021-12-14 PROCEDURE — 77014 PR CT GUIDANCE PLACEMENT RAD THERAPY FIELDS: CPT | Performed by: RADIOLOGY

## 2021-12-14 PROCEDURE — 77386 HC NTSTY MODUL RAD TX DLVR CPLX: CPT | Performed by: RADIOLOGY

## 2021-12-15 ENCOUNTER — HOSPITAL ENCOUNTER (OUTPATIENT)
Dept: RADIATION ONCOLOGY | Age: 81
Discharge: HOME | End: 2021-12-15
Attending: RADIOLOGY
Payer: MEDICARE

## 2021-12-15 PROCEDURE — 77386 HC NTSTY MODUL RAD TX DLVR CPLX: CPT | Performed by: RADIOLOGY

## 2021-12-15 PROCEDURE — 77014 PR CT GUIDANCE PLACEMENT RAD THERAPY FIELDS: CPT | Performed by: RADIOLOGY

## 2021-12-16 ENCOUNTER — HOSPITAL ENCOUNTER (OUTPATIENT)
Age: 81
Discharge: HOME | End: 2021-12-16
Payer: MEDICARE

## 2021-12-16 ENCOUNTER — HOSPITAL ENCOUNTER (OUTPATIENT)
Dept: RADIATION ONCOLOGY | Age: 81
Discharge: HOME | End: 2021-12-16
Attending: RADIOLOGY
Payer: MEDICARE

## 2021-12-16 DIAGNOSIS — C90.20 EXTRAMEDULLARY PLASMACYTOMA, UNSPECIFIED WHETHER REMISSION ACHIEVED (HCC): ICD-10-CM

## 2021-12-16 LAB
BASOPHILS # BLD: 0.3 %
BASOPHILS ABSOLUTE: 0 THOU/MM3 (ref 0–0.1)
EOSINOPHIL # BLD: 2.7 %
EOSINOPHILS ABSOLUTE: 0.2 THOU/MM3 (ref 0–0.4)
ERYTHROCYTE [DISTWIDTH] IN BLOOD BY AUTOMATED COUNT: 13.2 % (ref 11.5–14.5)
ERYTHROCYTE [DISTWIDTH] IN BLOOD BY AUTOMATED COUNT: 46.2 FL (ref 35–45)
HCT VFR BLD CALC: 42.2 % (ref 42–52)
HEMOGLOBIN: 13.6 GM/DL (ref 14–18)
IMMATURE GRANS (ABS): 0.03 THOU/MM3 (ref 0–0.07)
IMMATURE GRANULOCYTES: 0.5 %
LYMPHOCYTES # BLD: 22.5 %
LYMPHOCYTES ABSOLUTE: 1.4 THOU/MM3 (ref 1–4.8)
MCH RBC QN AUTO: 30.7 PG (ref 26–33)
MCHC RBC AUTO-ENTMCNC: 32.2 GM/DL (ref 32.2–35.5)
MCV RBC AUTO: 95.3 FL (ref 80–94)
MONOCYTES # BLD: 10.3 %
MONOCYTES ABSOLUTE: 0.6 THOU/MM3 (ref 0.4–1.3)
NUCLEATED RED BLOOD CELLS: 0 /100 WBC
PLATELET # BLD: 234 THOU/MM3 (ref 130–400)
PMV BLD AUTO: 11.1 FL (ref 9.4–12.4)
RBC # BLD: 4.43 MILL/MM3 (ref 4.7–6.1)
SEG NEUTROPHILS: 63.7 %
SEGMENTED NEUTROPHILS ABSOLUTE COUNT: 4 THOU/MM3 (ref 1.8–7.7)
WBC # BLD: 6.3 THOU/MM3 (ref 4.8–10.8)

## 2021-12-16 PROCEDURE — 99213 OFFICE O/P EST LOW 20 MIN: CPT | Performed by: RADIOLOGY

## 2021-12-16 PROCEDURE — 36415 COLL VENOUS BLD VENIPUNCTURE: CPT

## 2021-12-16 PROCEDURE — 77014 PR CT GUIDANCE PLACEMENT RAD THERAPY FIELDS: CPT | Performed by: RADIOLOGY

## 2021-12-16 PROCEDURE — 77386 HC NTSTY MODUL RAD TX DLVR CPLX: CPT | Performed by: RADIOLOGY

## 2021-12-16 PROCEDURE — 85025 COMPLETE CBC W/AUTO DIFF WBC: CPT

## 2021-12-17 ENCOUNTER — HOSPITAL ENCOUNTER (OUTPATIENT)
Dept: RADIATION ONCOLOGY | Age: 81
Discharge: HOME | End: 2021-12-17
Attending: RADIOLOGY
Payer: MEDICARE

## 2021-12-17 PROCEDURE — 77014 PR CT GUIDANCE PLACEMENT RAD THERAPY FIELDS: CPT | Performed by: RADIOLOGY

## 2021-12-17 PROCEDURE — 77386 HC NTSTY MODUL RAD TX DLVR CPLX: CPT | Performed by: RADIOLOGY

## 2021-12-20 ENCOUNTER — HOSPITAL ENCOUNTER (OUTPATIENT)
Dept: RADIATION ONCOLOGY | Age: 81
Discharge: HOME | End: 2021-12-20
Attending: RADIOLOGY
Payer: MEDICARE

## 2021-12-20 PROCEDURE — 77427 RADIATION TX MANAGEMENT X5: CPT | Performed by: RADIOLOGY

## 2021-12-20 PROCEDURE — 77386 HC NTSTY MODUL RAD TX DLVR CPLX: CPT | Performed by: RADIOLOGY

## 2021-12-20 PROCEDURE — 77336 RADIATION PHYSICS CONSULT: CPT | Performed by: RADIOLOGY

## 2021-12-20 PROCEDURE — 77014 PR CT GUIDANCE PLACEMENT RAD THERAPY FIELDS: CPT | Performed by: RADIOLOGY

## 2021-12-21 ENCOUNTER — HOSPITAL ENCOUNTER (OUTPATIENT)
Dept: RADIATION ONCOLOGY | Age: 81
Discharge: HOME | End: 2021-12-21
Attending: RADIOLOGY
Payer: MEDICARE

## 2021-12-21 PROCEDURE — 77014 PR CT GUIDANCE PLACEMENT RAD THERAPY FIELDS: CPT | Performed by: RADIOLOGY

## 2021-12-21 PROCEDURE — 77386 HC NTSTY MODUL RAD TX DLVR CPLX: CPT | Performed by: RADIOLOGY

## 2021-12-22 ENCOUNTER — HOSPITAL ENCOUNTER (OUTPATIENT)
Dept: RADIATION ONCOLOGY | Age: 81
Discharge: HOME | End: 2021-12-22
Attending: RADIOLOGY
Payer: MEDICARE

## 2021-12-22 PROCEDURE — 77386 HC NTSTY MODUL RAD TX DLVR CPLX: CPT | Performed by: RADIOLOGY

## 2021-12-22 PROCEDURE — 77014 PR CT GUIDANCE PLACEMENT RAD THERAPY FIELDS: CPT | Performed by: RADIOLOGY

## 2021-12-23 ENCOUNTER — HOSPITAL ENCOUNTER (OUTPATIENT)
Dept: RADIATION ONCOLOGY | Age: 81
Discharge: HOME | End: 2021-12-23
Attending: RADIOLOGY
Payer: MEDICARE

## 2021-12-23 PROCEDURE — 77014 PR CT GUIDANCE PLACEMENT RAD THERAPY FIELDS: CPT | Performed by: RADIOLOGY

## 2021-12-23 PROCEDURE — 77386 HC NTSTY MODUL RAD TX DLVR CPLX: CPT | Performed by: RADIOLOGY

## 2021-12-27 ENCOUNTER — HOSPITAL ENCOUNTER (OUTPATIENT)
Dept: RADIATION ONCOLOGY | Age: 81
Discharge: HOME | End: 2021-12-27
Attending: RADIOLOGY
Payer: MEDICARE

## 2021-12-27 PROCEDURE — 77014 PR CT GUIDANCE PLACEMENT RAD THERAPY FIELDS: CPT | Performed by: RADIOLOGY

## 2021-12-27 PROCEDURE — 77386 HC NTSTY MODUL RAD TX DLVR CPLX: CPT | Performed by: RADIOLOGY

## 2021-12-27 PROCEDURE — 77336 RADIATION PHYSICS CONSULT: CPT | Performed by: RADIOLOGY

## 2021-12-28 ENCOUNTER — HOSPITAL ENCOUNTER (OUTPATIENT)
Dept: RADIATION ONCOLOGY | Age: 81
Discharge: HOME | End: 2021-12-28
Attending: RADIOLOGY
Payer: MEDICARE

## 2021-12-28 PROCEDURE — 77014 PR CT GUIDANCE PLACEMENT RAD THERAPY FIELDS: CPT | Performed by: RADIOLOGY

## 2021-12-28 PROCEDURE — 77386 HC NTSTY MODUL RAD TX DLVR CPLX: CPT | Performed by: RADIOLOGY

## 2021-12-28 PROCEDURE — 77427 RADIATION TX MANAGEMENT X5: CPT | Performed by: RADIOLOGY

## 2021-12-29 ENCOUNTER — HOSPITAL ENCOUNTER (OUTPATIENT)
Dept: RADIATION ONCOLOGY | Age: 81
Discharge: HOME | End: 2021-12-29
Attending: RADIOLOGY
Payer: MEDICARE

## 2021-12-29 PROCEDURE — 77014 PR CT GUIDANCE PLACEMENT RAD THERAPY FIELDS: CPT | Performed by: RADIOLOGY

## 2021-12-29 PROCEDURE — 77386 HC NTSTY MODUL RAD TX DLVR CPLX: CPT | Performed by: RADIOLOGY

## 2021-12-30 ENCOUNTER — HOSPITAL ENCOUNTER (OUTPATIENT)
Dept: RADIATION ONCOLOGY | Age: 81
Discharge: HOME | End: 2021-12-30
Attending: RADIOLOGY
Payer: MEDICARE

## 2021-12-30 PROCEDURE — 77014 PR CT GUIDANCE PLACEMENT RAD THERAPY FIELDS: CPT | Performed by: RADIOLOGY

## 2021-12-30 PROCEDURE — 77386 HC NTSTY MODUL RAD TX DLVR CPLX: CPT | Performed by: RADIOLOGY

## 2022-01-03 ENCOUNTER — HOSPITAL ENCOUNTER (OUTPATIENT)
Dept: RADIATION ONCOLOGY | Age: 82
Discharge: HOME | End: 2022-01-03
Attending: RADIOLOGY
Payer: MEDICARE

## 2022-01-03 PROCEDURE — 77336 RADIATION PHYSICS CONSULT: CPT | Performed by: RADIOLOGY

## 2022-01-03 PROCEDURE — 77386 HC NTSTY MODUL RAD TX DLVR CPLX: CPT | Performed by: RADIOLOGY

## 2022-01-03 PROCEDURE — 77014 PR CT GUIDANCE PLACEMENT RAD THERAPY FIELDS: CPT | Performed by: RADIOLOGY

## 2022-01-04 ENCOUNTER — HOSPITAL ENCOUNTER (OUTPATIENT)
Dept: RADIATION ONCOLOGY | Age: 82
Discharge: HOME | End: 2022-01-04
Attending: RADIOLOGY
Payer: MEDICARE

## 2022-01-04 PROCEDURE — 77014 PR CT GUIDANCE PLACEMENT RAD THERAPY FIELDS: CPT | Performed by: RADIOLOGY

## 2022-01-04 PROCEDURE — 77386 HC NTSTY MODUL RAD TX DLVR CPLX: CPT | Performed by: RADIOLOGY

## 2022-01-05 ENCOUNTER — HOSPITAL ENCOUNTER (OUTPATIENT)
Dept: RADIATION ONCOLOGY | Age: 82
Discharge: HOME | End: 2022-01-05
Attending: RADIOLOGY
Payer: MEDICARE

## 2022-01-05 PROCEDURE — 77386 HC NTSTY MODUL RAD TX DLVR CPLX: CPT | Performed by: RADIOLOGY

## 2022-01-05 PROCEDURE — 77427 RADIATION TX MANAGEMENT X5: CPT | Performed by: RADIOLOGY

## 2022-01-05 PROCEDURE — 77014 PR CT GUIDANCE PLACEMENT RAD THERAPY FIELDS: CPT | Performed by: RADIOLOGY

## 2022-01-06 ENCOUNTER — HOSPITAL ENCOUNTER (OUTPATIENT)
Dept: RADIATION ONCOLOGY | Age: 82
Discharge: HOME | End: 2022-01-06
Attending: RADIOLOGY
Payer: MEDICARE

## 2022-01-06 PROCEDURE — 77386 HC NTSTY MODUL RAD TX DLVR CPLX: CPT | Performed by: RADIOLOGY

## 2022-01-06 PROCEDURE — 77014 PR CT GUIDANCE PLACEMENT RAD THERAPY FIELDS: CPT | Performed by: RADIOLOGY

## 2022-01-07 ENCOUNTER — HOSPITAL ENCOUNTER (OUTPATIENT)
Dept: RADIATION ONCOLOGY | Age: 82
Discharge: HOME | End: 2022-01-07
Attending: RADIOLOGY
Payer: MEDICARE

## 2022-01-07 PROCEDURE — 77014 PR CT GUIDANCE PLACEMENT RAD THERAPY FIELDS: CPT | Performed by: RADIOLOGY

## 2022-01-07 PROCEDURE — 77386 HC NTSTY MODUL RAD TX DLVR CPLX: CPT | Performed by: RADIOLOGY

## 2022-01-10 ENCOUNTER — HOSPITAL ENCOUNTER (OUTPATIENT)
Dept: RADIATION ONCOLOGY | Age: 82
Discharge: HOME | End: 2022-01-10
Attending: RADIOLOGY
Payer: MEDICARE

## 2022-01-10 PROCEDURE — 77014 PR CT GUIDANCE PLACEMENT RAD THERAPY FIELDS: CPT | Performed by: RADIOLOGY

## 2022-01-10 PROCEDURE — 77386 HC NTSTY MODUL RAD TX DLVR CPLX: CPT | Performed by: RADIOLOGY

## 2022-01-10 PROCEDURE — 77336 RADIATION PHYSICS CONSULT: CPT | Performed by: RADIOLOGY

## 2022-01-11 ENCOUNTER — HOSPITAL ENCOUNTER (OUTPATIENT)
Dept: RADIATION ONCOLOGY | Age: 82
Discharge: HOME | End: 2022-01-11
Attending: RADIOLOGY
Payer: MEDICARE

## 2022-01-11 PROCEDURE — 77386 HC NTSTY MODUL RAD TX DLVR CPLX: CPT | Performed by: RADIOLOGY

## 2022-01-11 PROCEDURE — 77014 PR CT GUIDANCE PLACEMENT RAD THERAPY FIELDS: CPT | Performed by: RADIOLOGY

## 2022-01-12 ENCOUNTER — HOSPITAL ENCOUNTER (OUTPATIENT)
Dept: RADIATION ONCOLOGY | Age: 82
Discharge: HOME | End: 2022-01-12
Attending: RADIOLOGY
Payer: MEDICARE

## 2022-01-12 PROCEDURE — 77427 RADIATION TX MANAGEMENT X5: CPT | Performed by: RADIOLOGY

## 2022-01-12 PROCEDURE — 77014 PR CT GUIDANCE PLACEMENT RAD THERAPY FIELDS: CPT | Performed by: RADIOLOGY

## 2022-01-12 PROCEDURE — 77386 HC NTSTY MODUL RAD TX DLVR CPLX: CPT | Performed by: RADIOLOGY

## 2022-01-13 NOTE — PROGRESS NOTES
[address] [facility] [address], [address]        DORISBLAISE PADMINI SANCHEZ II.VI[place],  [facility]        Chen Pooleer: [phone]        F: [phone]       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 male with L neck mass with recent [facility] bx reveaaling a plasma cell neoplasm/ possible plasmacytoma. Patient cancelled heme appt due on  as he wanted all care closer to home. Otolaryngology care per Dr Bishop Tiwari at [facility]. Patient had Covid on , outpt mngmt and recovered. April noted lump L neck under the jaw. Since core bx has been done, he states the mass is much smaller. Since April ,legs a bit weaker bilaterally but no pain except in knees. Overall describes a sense of wellbeing is stable and unchanged. Denies any drenching sweats fevers or weight loss. Denies any midline back pain or pain in his upper arms or legs. Denies any frequent or severe infections. He had Covid vaccination done in April. 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 Radiopharmaceutical: 15.6 mCi F-18 FDG, intravenously. TECHNIQUE: PET/CT imaging was performed from the skull base to the midthigh levels using routine PET acquisition. Injection site: Left antecubital fossa. Time of FDG injection: 8:51 AM.       Serum glucose: 110 mg/dL at 8:49 AM.       Time of imagin:58 AM.       COMPARISON: None       FINDINGS:        Neck: Confusion confluent left-sided level 2 cervical lymph nodes measure 1.2 cm in short axis diameter and are associated with a maximum SUV of 2.3 (image 50). Chest: Cardiac size is normal. Atherosclerotic calcifications are present in the thoracic aorta and coronary arteries. 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 There is no pleural or    pericardial effusion. Calcified granulomas are present in the bilateral lungs. There is scarring at the bilateral lung apices. An indistinct 0.9 cm density in the left lung apex has a maximum SUV of 1.8 (image 94). Minimal alveolar opacities at the    bilateral lung bases are likely infectious or inflammatory. There are multiple FDG avid mediastinal lymph nodes. A representative subcarinal lymph node measures 1.9 cm in short axis diameter and has a maximum SUV of 4.9 (image 115). A nonenlarged left    hilar lymph node has a maximum SUV of 3.2 (image 108). There is no FDG avid axillary lymphadenopathy. Activity associated with a small hiatal hernia may be infectious or inflammatory. Degenerative changes are present in the thoracic spine without    evidence of hypermetabolic osseous metastatic disease. Periarticular activity at the bilateral shoulders is likely degenerative. Abdomen/pelvis: Physiologic activity is present in the liver, spleen, urinary collecting system and gastrointestinal tract. There is a calcification in the lumen of the gallbladder. There is mild fatty replacement of the pancreas.  Atherosclerotic calcific lesions are present in the abdominal aorta without evidence of aneurysm. There are diverticula in sigmoid colon without evidence of diverticulitis. The prostate gland is enlarged and contains calcifications. There is no FDG avid mesenteric,    retroperitoneal or pelvic lymphadenopathy. A left inguinal lymph node which measures 1.0 cm in short axis diameter has a maximum SUV of 4.6 (image 268). Degenerative changes are seen in the lumbar spine and pelvis without evidence of hypermetabolic    osseous metastatic disease. Impression   1. Confluent mildly FDG avid left cervical lymphadenopathy which likely corresponds to known malignancy. 2. FDG avid mediastinal, left hilar and left inguinal lymphadenopathy suspicious for metastatic disease. 3. Small, mildly FDG avid density at the left lung apex of indeterminant etiology. Malignancy cannot be excluded. Final report electronically signed by Dr. Jacquelyn Thomson on 10/7/2021 11:13 AM     PROCEDURES:   Bronch/EBUS 10/22/21  This included BAL of the right middle lobe. Normal airways. Mediastinal lymphadenopathy. Complete ultrasound lymph node exam performed including stations 2, 4, 7, 10 and 11. Station 7 lymph nodes were heterogeneous in appearance, irregular and enlarged with calcifications. 12 passes on station 7. Airways are normal.  Abnormal appearance of station 7 lymph nodes, normal lymphocytes seen no malignancy identified. FINAL RESULTS:    A.  Bronchoalveolar lavage:                  No malignant cells seen. Alveolar macrophages. B.  Lymph node, station 7, fine-needle aspirate:                  No malignant cells seen. Benign appearing lymphoid sample.       Lymph node bx [facility] 8/24/21  Case Report   Surgical Pathology Report                         Case: [id number]                                   Authorizing Provider:  Dalia Kim MD              Collected: 08/24/2021 03:19 PM           Ordering Location:     [facility]               Received:            08/24/2021 04:27 PM           Pathologist:           Deja Kulkarni MD                                                             Specimens:   A) - SURG PATH, left neck level II LN for lymphoma work up                                          B) - [address], left neck level II LN                                                         Clinical History       Preop Diagnosis:  Please perform core biopsy of left level II lymph node - will need to send tissue for flow for workup for possible lymphoma. Associated Diagnosis:  Follow-up, Z09. Pathologic Diagnosis       A. Lymph node, left neck level II, biopsy:  · Plasma cell neoplasm, see comment. Electronically signed by Deja Kulkarni MD on 8/27/2021 at 11:54 AM     Diagnosis Comments       The biopsy shows effaced lymph node with infiltrations of sheets of mature plasma cells admixed with small lymphocytes. Touch print shows similar morphology. The plasma cells are positive for HgSO96t,  and is Kappa+ light chain restricted (Courtney Chamber). They are negative for CD5, Cyclin D1, Cd20, Cd117, and Lambda-WOJCIECH. The background small lymphocytes are positive for T cells (30%), and a fewer B-cells (10%). The Ki67 show approx. 10-20% positivity and most are those reactive T-cells. The findings are consistent with Plasmacytoma. A differential diagnosis of lymphoplasmacytic lymphoma or marginal zone lymphoma is considered but given the background of few B-cells these findings are unlikely. Correlation with IgH and molecular study is recommended. Correlation with systemic examination and serum test is recommended to rule out multiple myeloma. All controls show appropriate reactivity.      All immunohistochemistry, in situ hybridization, and histochemical tests were developed by and are performed at the [facility], [address]. All tests reported here, except those addressing HER2 overexpression as a predictive marker, have not been cleared by or approved by the [employer] and Drug Administration (FDA). The laboratory is regulated under CLIA as qualified to perform high-complexity testing. The tests are used for clinical purposes. They should not be regarded as investigational or for research. Addendum       IHC for CD3 highlights T-cells similar to those of Cd5. MUM1 are positive on the plasma cells similar to those of Cd138. The Lambda-WOJCIECH show few plasma cells and K:L ratio>20:1. The findings confirm the final diagnosis. All controls show appropriate reactivity. All immunohistochemistry, in situ hybridization, and histochemical tests were developed by and are performed at the [address] [place] [address] Laboratory, [address]. All tests reported here, except those addressing HER2 overexpression as a predictive marker, have not been cleared by or approved by the [employer] and Drug Administration (FDA). The laboratory is regulated under CLIA as qualified to perform high-complexity testing. The tests are used for clinical purposes. They should not be regarded as investigational or for research. Addendum electronically signed by Snow Manriquez MD on 8/31/2021 at  9:28 AM     Addendum 2       IgH PCR shows predominant monoclonal patterns, consistent with the presence of a clonalm B-cell or plasma cell population. The final diagnosis with differential diagnosis remains unchanged. Addendum electronically signed by Snow Manriquez MD on 9/2/2021 at  8:12 AM     Microscopic Description       A microscopic examination was performed. Synoptic report  Specimen Site: Lymph node, left neck level II  Procedure: Biopsies  Diagnosis:  Plasma cell neoplasm, see comment.      Microscopic description: The H&E section reveal a completely effaced lymphoid tissue with diffuse infiltrate with predominantly plasma cells admixed with small lymphoid cells. There are no large atypical lymphocytes present. --Ancillary Studies--  Flow cytometry: Not performed  Molecular studies: Submitted for IgH PCR and result will be followed in the addendum. Special stains: Not performed  Immunohistochemistry (IHC)/in situ hybridization (WOJCIECH):     Neoplastic cells are positive for: CD19, , and Kappa WOJCIECH and plasma cell antigen (Vs38C). Neoplastic cells are negative for: CD5, CD10, CD20, PAX5, BCL1, Lambda WOJCIECH, and Ki67 (10% by manual quantification method)  Other IHC findings: see comment. IHC for Kappa and Lambda is unremarkable on both B and plasma cells. CD10 is virtually negative on the biopsy. CD20 and PAX5 show a few B-cell clusters (<10%) and they are negative for Cd5, Cd10, and BCL1. HEME Mutation Panel  Inactivating and splice site TET2 mutations detected. No MYD88, CARD11, CXCR4, KLF2, NOTCH1, NOTCH2 or any other mutations associated wtih specific lymphoma types are noted. en cleared by or approved by the [employer] and Drug Administration (FDA). The laboratory is regulated under CLIA as qualified to perform high-complexity testing. The tests are used for clinical purposes. They should not be regarded as investigational or for research.           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 Paige Kerns     12/14/2021 - 1/12/2022 Radiation    Treatment Course Number: 1    Treatment Site (s) Modality Dose (cGy) Fractions Elapsed Days   Left Neck Plasmacytoma IMRT 4000 20 29     Cumulative Dose: 4000 cGy    Radiation therapy delivered under the care of Dr. Tona Parikh MD MS ([facility])          Treatment Tolerance/Response:     Comfort Alteration  Fatigue:Able to perform daily activities with rest periods    Pain Location: n/a  Pain Intensity (Current): 0 No Pain  Pain Treatment: No treatment scheduled  Pain Relief: n/a    Emotional Alteration:   Coping: effective    Nutritional Alteration  Anorexia: none   Nausea: No nausea noted  Vomiting: No vomiting   Salivary Glands- Acute: No changes over baseline  Taste Disturbance (Dysgeusia): Normal   Dyspepsia/Heartburn: None  Dysphagia/Esophagitis: None    Skin Alteration   Skin reaction: No changes noted    Mucous Membrane Alteration  Mucositis XRT Related: None  Pharynx and Esophagus- Acute: No change over baseline  Voice Changes: Normal    Ventilation Alteration  Cough: None  Hemoptysis: None  Dyspnea: Normal  Mucous Quantity/Quality: n/a    CNS Alteration  Level of Consciousness: Alert, responds briskly, appropriately with minimal stimulus  Seizure Activity: None  Insomnia: Normal   Orientation: Oriented in 3 spheres of person, place, and time  Comment: n/a  Speech Impairment: No  Ataxia: Normal    Additional Comments: Using CeraVe and Aquaphor daily. ECO - Symptomatic but completely ambulatory (Restricted in physically strenuous activity but ambulatory and able to carry out work of a light or sedentary nature. For example, light housework, office work)    Mohinder Mask tolerated radiation therapy well with no significant treatment related symptoms as detailed above. Radiation therapy was completed as planned without any significant treatment breaks or suspensions. Any treatment effects experienced at completion of therapy should improve or resolve in the next 2-3 weeks. Continue symptom management, if required, as instructed on the last day of treatment. Systemic Therapy: None    Follow Up Plan: Patient tolerated radiation therapy well overall with no significant side effects noted above. Continue regular follow up with all other treating physicians. The patient will return to clinic in 1 month or sooner if clinically indicated.  They have our clinic number to call with any questions or concerns if needed. Thank you for allowing us to be a part of their care.     Electronically signed by Christi Siegel MD on 1/13/2022 at 10:37 AM  Radiation Oncology    CC:  Dr. Azam Urias ([facility])   ACC: Tumor Registry: [address] [facility]

## 2022-03-01 ENCOUNTER — HOSPITAL ENCOUNTER (OUTPATIENT)
Dept: RADIATION ONCOLOGY | Age: 82
Discharge: HOME | End: 2022-03-01
Attending: RADIOLOGY
Payer: MEDICARE

## 2022-03-01 VITALS
TEMPERATURE: 98 F | SYSTOLIC BLOOD PRESSURE: 140 MMHG | OXYGEN SATURATION: 95 % | WEIGHT: 208.2 LBS | RESPIRATION RATE: 18 BRPM | HEART RATE: 78 BPM | DIASTOLIC BLOOD PRESSURE: 70 MMHG | BODY MASS INDEX: 29.87 KG/M2

## 2022-03-01 PROCEDURE — 99212 OFFICE O/P EST SF 10 MIN: CPT | Performed by: RADIOLOGY

## 2022-03-03 NOTE — PROGRESS NOTES
[facility] OF [facility])         [address], [address]        JERARDO PADMINI SANCHEZ II.VIERTEL,  [facility]        Audrey Ervin: [phone]        F: [phone]       [email or web address]          FOLLOW UP    Date of Service: 3/1/2022  Patient ID: Franklin Hernandez   : 1940  MRN: [id number]   Acct Number: [de-identified]       DATE OF SERVICE: 3/1/2022   LOCATION: [facility]  PROVIDER: Berenice Hamilton MD    FOLLOW UP PHYSICIANS: Dr. 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)  Staging form: Plasma Cell Myeloma And Plasma Cell Disorders, AJCC 8th Edition  - Clinical stage from 10/7/2021: Beta-2-microglobulin (mg/L): 3.1, Albumin (g/dL): 4.2, ISS: Stage I, High-risk cytogenetics: Unknown, LDH: Normal - Signed by Karen Painting MD on 11/10/2021      Patient presents today doing well overall with no significant complaints related to his recent treatment of his left neck plasmacytoma radiation completed 2022 this is now approximately 6 weeks out post treatment. Patient states he has good p.o. with no limitations denies any issues with swallowing has good taste. Patient notes only mild xerostomia overall. Patient to continue regular follow-up visits with medical oncology. There is no clinical evidence for recurrent disease at this time, will continue to follow with routine physical exam and surveillance imaging as needed. The patient will return to clinic in 6 months or sooner if clinically indicated. They have our clinic number to call with any questions or concerns if needed. Thank you for allowing us to be a part of their care. HPI:  Oncology History Overview Note   As per [facility] on 21:  Franklin Hernandez is a 80 y.o. male with L neck mass with recent [facility] bx reveaaling a plasma cell neoplasm/ possible plasmacytoma. Patient cancelled heme appt due on  as he wanted all care closer to home.  Otolaryngology care per  Fannie Shields at [facility]. Patient had Covid on , outpt mngmt and recovered. April noted lump L neck under the jaw. Since core bx has been done, he states the mass is much smaller. Since April ,legs a bit weaker bilaterally but no pain except in knees. Overall describes a sense of wellbeing is stable and unchanged. Denies any drenching sweats fevers or weight loss. Denies any midline back pain or pain in his upper arms or legs. Denies any frequent or severe infections. He had Covid vaccination done in April. 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 Radiopharmaceutical: 15.6 mCi F-18 FDG, intravenously. TECHNIQUE: PET/CT imaging was performed from the skull base to the midthigh levels using routine PET acquisition. Injection site: Left antecubital fossa. Time of FDG injection: 8:51 AM.       Serum glucose: 110 mg/dL at 8:49 AM.       Time of imagin:58 AM.       COMPARISON: None       FINDINGS:        Neck: Confusion confluent left-sided level 2 cervical lymph nodes measure 1.2 cm in short axis diameter and are associated with a maximum SUV of 2.3 (image 50). Chest: Cardiac size is normal. Atherosclerotic calcifications are present in the thoracic aorta and coronary arteries. 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 There is no pleural or    pericardial effusion. Calcified granulomas are present in the bilateral lungs. There is scarring at the bilateral lung apices. An indistinct 0.9 cm density in the left lung apex has a maximum SUV of 1.8 (image 94).  Minimal alveolar opacities at the    bilateral lung bases are likely infectious or inflammatory. There are multiple FDG avid mediastinal lymph nodes. A representative subcarinal lymph node measures 1.9 cm in short axis diameter and has a maximum SUV of 4.9 (image 115). A nonenlarged left    hilar lymph node has a maximum SUV of 3.2 (image 108). There is no FDG avid axillary lymphadenopathy. Activity associated with a small hiatal hernia may be infectious or inflammatory. Degenerative changes are present in the thoracic spine without    evidence of hypermetabolic osseous metastatic disease. Periarticular activity at the bilateral shoulders is likely degenerative. Abdomen/pelvis: Physiologic activity is present in the liver, spleen, urinary collecting system and gastrointestinal tract. There is a calcification in the lumen of the gallbladder. There is mild fatty replacement of the pancreas. Atherosclerotic    calcific lesions are present in the abdominal aorta without evidence of aneurysm. There are diverticula in sigmoid colon without evidence of diverticulitis. The prostate gland is enlarged and contains calcifications. There is no FDG avid mesenteric,    retroperitoneal or pelvic lymphadenopathy. A left inguinal lymph node which measures 1.0 cm in short axis diameter has a maximum SUV of 4.6 (image 268). Degenerative changes are seen in the lumbar spine and pelvis without evidence of hypermetabolic    osseous metastatic disease. Impression   1. Confluent mildly FDG avid left cervical lymphadenopathy which likely corresponds to known malignancy. 2. FDG avid mediastinal, left hilar and left inguinal lymphadenopathy suspicious for metastatic disease. 3. Small, mildly FDG avid density at the left lung apex of indeterminant etiology. Malignancy cannot be excluded.        Final report electronically signed by Dr. Snow Castellanos on 10/7/2021 11:13 AM     PROCEDURES:   Bronch/EBUS 10/22/21  This included BAL of the right middle lobe. Normal airways. Mediastinal lymphadenopathy. Complete ultrasound lymph node exam performed including stations 2, 4, 7, 10 and 11. Station 7 lymph nodes were heterogeneous in appearance, irregular and enlarged with calcifications. 12 passes on station 7. Airways are normal.  Abnormal appearance of station 7 lymph nodes, normal lymphocytes seen no malignancy identified. FINAL RESULTS:    A.  Bronchoalveolar lavage:                  No malignant cells seen. Alveolar macrophages. B.  Lymph node, station 7, fine-needle aspirate:                  No malignant cells seen. Benign appearing lymphoid sample. Lymph node bx [facility] 8/24/21  Case Report   Surgical Pathology Report                         Case: [id number]                                   Authorizing Provider:  Ruthie Larkin MD              Collected:           08/24/2021 03:19 PM           Ordering Location:     [facility]               Received:            08/24/2021 04:27 PM           Pathologist:           Katie Zambrano MD                                                             Specimens:   A) - SURG PATH, left neck level II LN for lymphoma work up                                          B) - [address], left neck level II LN                                                         Clinical History       Preop Diagnosis:  Please perform core biopsy of left level II lymph node - will need to send tissue for flow for workup for possible lymphoma. Associated Diagnosis:  Follow-up, Z09. Pathologic Diagnosis       A. Lymph node, left neck level II, biopsy:  · Plasma cell neoplasm, see comment. Electronically signed by Katie Zambrano MD on 8/27/2021 at 11:54 AM     Diagnosis Comments       The biopsy shows effaced lymph node with infiltrations of sheets of mature plasma cells admixed with small lymphocytes. Touch print shows similar morphology.   The plasma cells are positive for ZnLN02y,  and is Kappa+ light chain restricted (Clarisa Joaquin). They are negative for CD5, Cyclin D1, Cd20, Cd117, and Lambda-WOJCIECH. The background small lymphocytes are positive for T cells (30%), and a fewer B-cells (10%). The Ki67 show approx. 10-20% positivity and most are those reactive T-cells. The findings are consistent with Plasmacytoma. A differential diagnosis of lymphoplasmacytic lymphoma or marginal zone lymphoma is considered but given the background of few B-cells these findings are unlikely. Correlation with IgH and molecular study is recommended. Correlation with systemic examination and serum test is recommended to rule out multiple myeloma. All controls show appropriate reactivity. All immunohistochemistry, in situ hybridization, and histochemical tests were developed by and are performed at the [address] [place] [address], [address]. All tests reported here, except those addressing HER2 overexpression as a predictive marker, have not been cleared by or approved by the [employer] and Drug Administration (FDA). The laboratory is regulated under CLIA as qualified to perform high-complexity testing. The tests are used for clinical purposes. They should not be regarded as investigational or for research. Addendum       IHC for CD3 highlights T-cells similar to those of Cd5. MUM1 are positive on the plasma cells similar to those of Cd138. The Lambda-WOJCIECH show few plasma cells and K:L ratio>20:1. The findings confirm the final diagnosis. All controls show appropriate reactivity. All immunohistochemistry, in situ hybridization, and histochemical tests were developed by and are performed at the [address] [place] [address].  All tests reported here, except those addressing HER2 overexpression as a predictive marker, have not been cleared by or approved by the [employer] and Drug Administration (FDA). The laboratory is regulated under CLIA as qualified to perform high-complexity testing. The tests are used for clinical purposes. They should not be regarded as investigational or for research. Addendum electronically signed by Ronal Sarmiento MD on 8/31/2021 at  9:28 AM     Addendum 2       IgH PCR shows predominant monoclonal patterns, consistent with the presence of a clonalm B-cell or plasma cell population. The final diagnosis with differential diagnosis remains unchanged. Addendum electronically signed by Ronal Sarmiento MD on 9/2/2021 at  8:12 AM     Microscopic Description       A microscopic examination was performed. Synoptic report  Specimen Site: Lymph node, left neck level II  Procedure: Biopsies  Diagnosis:  Plasma cell neoplasm, see comment. Microscopic description: The H&E section reveal a completely effaced lymphoid tissue with diffuse infiltrate with predominantly plasma cells admixed with small lymphoid cells. There are no large atypical lymphocytes present. --Ancillary Studies--  Flow cytometry: Not performed  Molecular studies: Submitted for IgH PCR and result will be followed in the addendum. Special stains: Not performed  Immunohistochemistry (IHC)/in situ hybridization (WOJCIECH):     Neoplastic cells are positive for: CD19, , and Kappa WOJCIECH and plasma cell antigen (Vs38C). Neoplastic cells are negative for: CD5, CD10, CD20, PAX5, BCL1, Lambda WOJCIECH, and Ki67 (10% by manual quantification method)  Other IHC findings: see comment. IHC for Kappa and Lambda is unremarkable on both B and plasma cells. CD10 is virtually negative on the biopsy. CD20 and PAX5 show a few B-cell clusters (<10%) and they are negative for Cd5, Cd10, and BCL1. HEME Mutation Panel  Inactivating and splice site TET2 mutations detected.        No MYD88, CARD11, CXCR4, KLF2, NOTCH1, NOTCH2 or any other mutations associated wtih specific lymphoma types are noted. en cleared by or approved by the [employer] and Drug Administration (FDA). The laboratory is regulated under CLIA as qualified to perform high-complexity testing. The tests are used for clinical purposes. They should not be regarded as investigational or for research. 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 Frank Moran     12/14/2021 - 1/12/2022 Radiation    Treatment Course Number: 1    Treatment Site (s) Modality Dose (cGy) Fractions Elapsed Days   Left Neck Plasmacytoma IMRT 4000 20 29     Cumulative Dose: 4000 cGy    Radiation therapy delivered under the care of Dr. Rylee Stokes MD MS ([facility])          INTERVAL HISTORY:  Shae Viera is a [de-identified] y.o. male is presenting today for regularly scheduled follow up regarding the above mentioned oncologic history. Patient presents today doing well overall. Patient states that he maintains a good diet tolerating p.o. intake well with no significant limitations overall. Patient denies any choking or gagging with swallowing and states that he has no pain with swallowing. Patient states that his taste is good, however he continues to note intermittent mild dry mouth. Patient otherwise well and has no other specific general complaints on complete review of systems at this time. CHAPERONE: na    ROS:  A complete review of systems was performed and found to be negative except as presented above.     LABORATORY STUDIES:    Onc labs:   Lab Results   Component Value Date     09/23/2021       MEDICATIONS:   Current Outpatient Medications   Medication Sig Dispense Refill    acetaminophen (TYLENOL) 325 MG tablet Take 650 mg by mouth every 6 hours as needed for Pain      OMEPRAZOLE PO Take by mouth      cetirizine (ZYRTEC) 10 MG tablet Take 10 mg by mouth daily      Multiple Vitamins-Minerals (THERAPEUTIC MULTIVITAMIN-MINERALS) tablet Take 1 tablet by mouth daily      pravastatin (PRAVACHOL) 80 MG tablet Take 80 mg by mouth daily.  metoprolol (LOPRESSOR) 25 MG tablet Take 25 mg by mouth 2 times daily.  lisinopril (PRINIVIL;ZESTRIL) 5 MG tablet Take 5 mg by mouth daily.  aspirin 81 MG tablet Take 81 mg by mouth daily. No current facility-administered medications for this encounter. EXAMINATION:  VITAL SIGNS: BP (!) 140/70   Pulse 78   Temp 98 °F (36.7 °C) (Infrared)   Resp 18   Wt 208 lb 3.2 oz (94.4 kg)   SpO2 95%   BMI 29.87 kg/m²     ECO - Asymptomatic (Fully active, able to carry on all pre-disease activities without restriction)    PAIN: 0/10    General: NAD, AO x 3, Mentation is clear with appropriate affect. HEENT:  EOMI, oral mucosa without lesion or exudate, tongue mid-line  HEENT:  No palpable cervical lymphadenopathy noted bilaterally. Thorax:  Unlabored  COR: Regular rate and rhythm  Abdomen:  Non-distended  Neuro:  Cranial nerves grossly intact; no focal deficits    Electronically signed by Sandy Cintron MD on 3/3/22 at 5:27 PM EST     ATTESTATION: 30 minutes were spent with the patient at today's visit reviewing pertinent information related to their oncologic diagnosis, including any recent labs, imaging, follow ups and plan of care going forward.     CC:Dr. Gloria Martin ([facility])  ACC:[facility]

## 2022-04-26 ENCOUNTER — OFFICE VISIT (OUTPATIENT)
Dept: ONCOLOGY | Age: 82
End: 2022-04-26
Payer: MEDICARE

## 2022-04-26 ENCOUNTER — HOSPITAL ENCOUNTER (OUTPATIENT)
Dept: INFUSION THERAPY | Age: 82
Discharge: HOME | End: 2022-04-26
Payer: MEDICARE

## 2022-04-26 VITALS
RESPIRATION RATE: 18 BRPM | HEART RATE: 67 BPM | OXYGEN SATURATION: 97 % | TEMPERATURE: 97.5 F | DIASTOLIC BLOOD PRESSURE: 70 MMHG | HEIGHT: 70 IN | WEIGHT: 208 LBS | BODY MASS INDEX: 29.78 KG/M2 | SYSTOLIC BLOOD PRESSURE: 132 MMHG

## 2022-04-26 VITALS
HEART RATE: 67 BPM | DIASTOLIC BLOOD PRESSURE: 70 MMHG | TEMPERATURE: 97.5 F | SYSTOLIC BLOOD PRESSURE: 132 MMHG | RESPIRATION RATE: 18 BRPM

## 2022-04-26 DIAGNOSIS — C90.20 EXTRAMEDULLARY PLASMACYTOMA NOT HAVING ACHIEVED REMISSION (HCC): ICD-10-CM

## 2022-04-26 DIAGNOSIS — R59.0 MEDIASTINAL ADENOPATHY: Primary | ICD-10-CM

## 2022-04-26 LAB
ABSOLUTE IMMATURE GRANULOCYTE: 0.01 THOU/MM3 (ref 0–0.07)
ALBUMIN SERPL-MCNC: 4.3 G/DL (ref 3.5–5.1)
ALP BLD-CCNC: 80 U/L (ref 38–126)
ALT SERPL-CCNC: 11 U/L (ref 11–66)
AST SERPL-CCNC: 17 U/L (ref 5–40)
BASINOPHIL, AUTOMATED: 0 % (ref 0–3)
BASOPHILS ABSOLUTE: 0 THOU/MM3 (ref 0–0.1)
BILIRUB SERPL-MCNC: 0.3 MG/DL (ref 0.3–1.2)
BILIRUBIN DIRECT: < 0.2 MG/DL (ref 0–0.3)
BUN, WHOLE BLOOD: 22 MG/DL (ref 8–26)
CHLORIDE, WHOLE BLOOD: 109 MEQ/L (ref 98–109)
CREATININE, WHOLE BLOOD: 1.6 MG/DL (ref 0.5–1.2)
EOSINOPHILS ABSOLUTE: 0.1 THOU/MM3 (ref 0–0.4)
EOSINOPHILS RELATIVE PERCENT: 2 % (ref 0–4)
GFR, ESTIMATED: 44 ML/MIN/1.73M2
GLUCOSE, WHOLE BLOOD: 83 MG/DL (ref 70–108)
HCT VFR BLD CALC: 36.7 % (ref 42–52)
HEMOGLOBIN: 12 GM/DL (ref 14–18)
IMMATURE GRANULOCYTES: 0 %
IONIZED CALCIUM, WHOLE BLOOD: 1.15 MMOL/L (ref 1.12–1.32)
LYMPHOCYTES # BLD: 22 % (ref 15–47)
LYMPHOCYTES ABSOLUTE: 1.3 THOU/MM3 (ref 1–4.8)
MCH RBC QN AUTO: 30 PG (ref 26–33)
MCHC RBC AUTO-ENTMCNC: 32.7 GM/DL (ref 32.2–35.5)
MCV RBC AUTO: 92 FL (ref 80–94)
MONOCYTES ABSOLUTE: 0.6 THOU/MM3 (ref 0.4–1.3)
MONOCYTES: 10 % (ref 0–12)
PDW BLD-RTO: 13.5 % (ref 11.5–14.5)
PLATELET # BLD: 219 THOU/MM3 (ref 130–400)
PMV BLD AUTO: 9.8 FL (ref 9.4–12.4)
POTASSIUM, WHOLE BLOOD: 4.4 MEQ/L (ref 3.5–4.9)
RBC # BLD: 4 MILL/MM3 (ref 4.7–6.1)
SEG NEUTROPHILS: 65 % (ref 43–75)
SEGMENTED NEUTROPHILS ABSOLUTE COUNT: 3.8 THOU/MM3 (ref 1.8–7.7)
SODIUM, WHOLE BLOOD: 142 MEQ/L (ref 138–146)
TOTAL CO2, WHOLE BLOOD: 23 MEQ/L (ref 23–33)
TOTAL PROTEIN: 7.4 G/DL (ref 6.1–8)
WBC # BLD: 5.7 THOU/MM3 (ref 4.8–10.8)

## 2022-04-26 PROCEDURE — 82784 ASSAY IGA/IGD/IGG/IGM EACH: CPT

## 2022-04-26 PROCEDURE — 80047 BASIC METABLC PNL IONIZED CA: CPT

## 2022-04-26 PROCEDURE — 80076 HEPATIC FUNCTION PANEL: CPT

## 2022-04-26 PROCEDURE — 99211 OFF/OP EST MAY X REQ PHY/QHP: CPT

## 2022-04-26 PROCEDURE — 36415 COLL VENOUS BLD VENIPUNCTURE: CPT

## 2022-04-26 PROCEDURE — 1123F ACP DISCUSS/DSCN MKR DOCD: CPT | Performed by: INTERNAL MEDICINE

## 2022-04-26 PROCEDURE — G8417 CALC BMI ABV UP PARAM F/U: HCPCS | Performed by: INTERNAL MEDICINE

## 2022-04-26 PROCEDURE — 84165 PROTEIN E-PHORESIS SERUM: CPT

## 2022-04-26 PROCEDURE — 84155 ASSAY OF PROTEIN SERUM: CPT

## 2022-04-26 PROCEDURE — 85025 COMPLETE CBC W/AUTO DIFF WBC: CPT

## 2022-04-26 PROCEDURE — 4040F PNEUMOC VAC/ADMIN/RCVD: CPT | Performed by: INTERNAL MEDICINE

## 2022-04-26 PROCEDURE — 83883 ASSAY NEPHELOMETRY NOT SPEC: CPT

## 2022-04-26 PROCEDURE — G8427 DOCREV CUR MEDS BY ELIG CLIN: HCPCS | Performed by: INTERNAL MEDICINE

## 2022-04-26 PROCEDURE — 86334 IMMUNOFIX E-PHORESIS SERUM: CPT

## 2022-04-26 PROCEDURE — 99214 OFFICE O/P EST MOD 30 MIN: CPT | Performed by: INTERNAL MEDICINE

## 2022-04-26 PROCEDURE — 1036F TOBACCO NON-USER: CPT | Performed by: INTERNAL MEDICINE

## 2022-04-26 NOTE — PROGRESS NOTES
[place] [address] [facility] [place] [address]  Dept: [phone]  Loc: [phone]   Hematology/Oncology Consult (Clinic)        4/26/2022    Lindbergh Hensley   1940     No ref. provider found [facility]  Shay Bergeron DO PCP is in [place]. 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 No chief complaint on file. DIAGNOSIS:  -Extramedullary plasmacytoma left neck (biopsy and t path at [facility]) 8/24/2021. Bone marrow biopsy-uninvolved. IgA 259/normal [id number]/minimally elevated, IgM 93/normal  Beta-2 microglobulin 3.1/mildly elevated  Serum immunofixation shows normal pattern with no monoclonal proteins. Serum free kappa light chains 84.82, lambda 37.03, ratio elevated at 2.29 (0.26-1.65)    -Abnormal PET scan (10/7/2021):  with multiple FDG avid mediastinal nodes. Representative subcarinal node 1.9 cm short axis with SUV of 4.9. Nonenlarged left hilar node with SUV of 3.2. Left inguinal node measuring 1 cm short axis with SUV of 4.6. Not palpable on exam.  Indeterminate 9 mm left apical lung nodule. No bone lesions. 10/22/2021 Dr. Veronique Martin EBUS-unremarkable although final cytology from station 7 core biopsy pending     -Indeterminate 9 mm left apex lung nodule mildly FDG avid with SUV of 1.8. TREATMENT:  - completed radiation to the left neck per Dr. Jarret Wharton 1/12/2022.  -s/p  [facility] EBUS for histology of the mediastinal nodes. Subcarinal node likely the best target. Path including subcarinal node all benign. PARAMETERS:  -PET/CT 10/7/2021  -Left neck exam; node excised. -Myeloma parameters ; serum light chains with elevated ratio. SUBJECTIVE: Patient is asymptomatic and feels great. Here for routine follow-up. No night sweats fevers or weight loss. Sense of wellbeing is great.     HPI: 81 yo male with L neck mass with recent [facility] bx reveaaling a plasma cell neoplasm/ possible plasmacytoma. Patient cancelled heme appt  due on  as he wanted all care closer to home. Otolaryngology care per Dr Karen Gomez at [address] [place] [address]. Patient had  Covid on , outpt mnt and recovered. April noted lump L neck under the jaw. Since core bx has been done, he states the mass is much smaller. Since April ,legs a bit weaker bilaterally but no pain except in knees. Overall describes a sense of wellbeing is stable and unchanged. Denies any drenching sweats fevers or weight loss. Denies any midline back pain or pain in his upper arms or legs. Denies any frequent or severe infections. He had Covid vaccination done in April. ROS:  Review of Systems 14 point negative except as detailed above. PMH:   Past Medical History:   Diagnosis Date    CAD (coronary artery disease)     Enlarged lymph nodes     Heart disease     Hyperlipidemia     Hypertension     Plasmacytoma ([address]     Stents x 2 . No MI,angina ,chf  Denies history of diabetes or stroke.     Social HX:   Social History     Socioeconomic History    Marital status:      Spouse name: Yemi Mcclure Number of children: 1    Years of education: Not on file    Highest education level: Not on file   Occupational History    Not on file   Tobacco Use    Smoking status: Former Smoker     Packs/day: 1.00     Years: 37.00     Pack years: 37.00     Types: Cigarettes     Start date: 1964     Quit date: 2001     Years since quittin.6    Smokeless tobacco: Never Used   Vaping Use    Vaping Use: Never used   Substance and Sexual Activity    Alcohol use: Not Currently     Comment: beer every so often     Drug use: Never    Sexual activity: Not on file   Other Topics Concern    Not on file   Social History Narrative    Not on file     Social Determinants of Health     Financial Resource Strain:     Difficulty of Paying Living Expenses: Not on file   Food Insecurity:     Worried About [address] in the Last Year: Not on file    Ran Out of Food in the Last Year: Not on file   Transportation Needs:     Lack of Transportation (Medical): Not on file    Lack of Transportation (Non-Medical): Not on file   Physical Activity:     Days of Exercise per Week: Not on file    Minutes of Exercise per Session: Not on file   Stress:     Feeling of Stress : Not on file   Social Connections:     Frequency of Communication with Friends and Family: Not on file    Frequency of Social Gatherings with Friends and Family: Not on file    Attends Ep[employer] Services: Not on file    Active Member of [address] [place] [address] [employer] or Organizations: Not on file    Attends Club or Organization Meetings: Not on file    Marital Status: Not on file   Intimate Partner Violence:     Fear of Current or Ex-Partner: Not on file    Emotionally Abused: Not on file    Physically Abused: Not on file    Sexually Abused: Not on file   Housing Stability:     Unable to Pay for Housing in the Last Year: Not on file    Number of [place] in the Last Year: Not on file    Unstable Housing in the Last Year: Not on file      [address] [facility] [address]. 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 Rudi Haider    Phone: [phone] [facility] [address]     Employment:  Retired [employer]    Immunizations: There is no immunization history on file for this patient.    Covid and pneumonia vaccine    Health Screenings:    C-Scope: 15 yrs  Prostate: ok          Health Maintenance Due   Topic Date Due    Pneumococcal 65+ years Vaccine (1 - PCV) Never done    Lipids  Never done    Depression Screen  Never done    COVID-19 Vaccine (1) Never done    DTaP/Tdap/Td vaccine (1 - Tdap) Never done    Shingles Vaccine (1 of 2) Never done   [employer] Visit (AWV)  Never done        Interests:   Not reviewed    Fam HX:   No Cancer hx    Hospitalizations:   None recent    Allergies:  No Known Allergies     Adult Illness:  Patient Active Problem List   Diagnosis    Extramedullary plasmacytoma ([address]    see above    Surgery:  Past Surgical History:   Procedure Laterality Date    ABSCESS DRAINAGE      SPIDER BITE    BRONCHOSCOPY N/A 10/22/2021    BRONCHOSCOPY W/EBUS FNA performed by Sheryle Sill, [place] at [place] [address]      COLONOSCOPY      CT BONE MARROW BIOPSY  2021    CT BONE MARROW BIOPSY 2021 [facility] CT SCAN    HERNIA REPAIR  2016        Medications:  Current Outpatient Medications   Medication Sig Dispense Refill    acetaminophen (TYLENOL) 325 MG tablet Take 650 mg by mouth every 6 hours as needed for Pain      OMEPRAZOLE PO Take by mouth      cetirizine (ZYRTEC) 10 MG tablet Take 10 mg by mouth daily      Multiple Vitamins-Minerals (THERAPEUTIC MULTIVITAMIN-MINERALS) tablet Take 1 tablet by mouth daily      pravastatin (PRAVACHOL) 80 MG tablet Take 80 mg by mouth daily.  metoprolol (LOPRESSOR) 25 MG tablet Take 25 mg by mouth 2 times daily.  lisinopril (PRINIVIL;ZESTRIL) 5 MG tablet Take 5 mg by mouth daily.  aspirin 81 MG tablet Take 81 mg by mouth daily. No current facility-administered medications for this visit. EXAM:    There were no vitals taken for this visit. ECO  General: Non-ill appearing. Appears younger than his stated age. HEENT: NC/AT,nonicteric, perrla,eom intact, no mucosal lesions, mildly hard of hearing. Neck: normal thyroid, no masses. pulses nl, no bruits,   Nodes: Left neck at the site of the core biopsy of the left mid mandible shows a vague fullness persisting. Lung: No wheezing , clear  CV: rrr, no rubs ,gallops or murmurs  Breasts: normal exam  Abd/Rectal: soft, non-tender,bowel sounds normal , no HSM,no masses  Back: normal curvature, No midline tenderness. flanks nontender  : Not Examined  Extremities: no cyanosis,clubbing or edema.   Skin: unremarkable  Neuro: A and O x 4, CN exam nonfocal, Motor- no deficits, Sensory- no deficits, gait-nl, speech- fluent, no ataxia. Devices: none      DATA:    LAB:     See 923 labs above including IgA 259, IgG 1961, IgM 93, beta-2 microglobulin 3.1  SPEP and serum immune fixation-no monoclonal protein  Serum free kappa light chains 84.8, lambda 37.0, ratio 2.29 (0.26-1.65)    IMAGING:  PROCEDURE: PET CT SKULL BASE TO MID THIGH    10/7/21       CLINICAL INFORMATION: 53-year-old man with extra medullary plasmacytoma in the left neck; initial treatment strategy.       Radiopharmaceutical: 15.6 mCi F-18 FDG, intravenously.       TECHNIQUE: PET/CT imaging was performed from the skull base to the midthigh levels using routine PET acquisition.       Injection site: Left antecubital fossa.       Time of FDG injection: 8:51 AM.       Serum glucose: 110 mg/dL at 8:49 AM.       Time of imagin:58 AM.       COMPARISON: None       FINDINGS:        Neck: Confusion confluent left-sided level 2 cervical lymph nodes measure 1.2 cm in short axis diameter and are associated with a maximum SUV of 2.3 (image 50).     Chest: Cardiac size is normal. Atherosclerotic calcifications are present in the thoracic aorta and coronary arteries. 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 There is no pleural or    pericardial effusion. Calcified granulomas are present in the bilateral lungs. There is scarring at the bilateral lung apices. An indistinct 0.9 cm density in the left lung apex has a maximum SUV of 1.8 (image 94). Minimal alveolar opacities at the    bilateral lung bases are likely infectious or inflammatory. There are multiple FDG avid mediastinal lymph nodes. A representative subcarinal lymph node measures 1.9 cm in short axis diameter and has a maximum SUV of 4.9 (image 115). A nonenlarged left    hilar lymph node has a maximum SUV of 3.2 (image 108). There is no FDG avid axillary lymphadenopathy.  Activity associated with a small hiatal hernia may be infectious or inflammatory. Degenerative changes are present in the thoracic spine without    evidence of hypermetabolic osseous metastatic disease. Periarticular activity at the bilateral shoulders is likely degenerative.       Abdomen/pelvis: Physiologic activity is present in the liver, spleen, urinary collecting system and gastrointestinal tract. There is a calcification in the lumen of the gallbladder. There is mild fatty replacement of the pancreas. Atherosclerotic    calcific lesions are present in the abdominal aorta without evidence of aneurysm. There are diverticula in sigmoid colon without evidence of diverticulitis. The prostate gland is enlarged and contains calcifications. There is no FDG avid mesenteric,    retroperitoneal or pelvic lymphadenopathy. A left inguinal lymph node which measures 1.0 cm in short axis diameter has a maximum SUV of 4.6 (image 268). Degenerative changes are seen in the lumbar spine and pelvis without evidence of hypermetabolic    osseous metastatic disease.           Impression   1. Confluent mildly FDG avid left cervical lymphadenopathy which likely corresponds to known malignancy. 2. FDG avid mediastinal, left hilar and left inguinal lymphadenopathy suspicious for metastatic disease. 3. Small, mildly FDG avid density at the left lung apex of indeterminant etiology.  Malignancy cannot be excluded.       Final report electronically signed by Dr. Dillon Celestin on 10/7/2021 11:13 AM       Order History    Open Order Details   PACS Images     Show images for PET CT SKULL BASE TO MID THIGH   Results History Report    View Report   Associated Diagnoses    Extramedullary plasmacytoma not having achieved remission [facility])       External Result Report    External Result Report   Existing Charges    Charge Line Charge Code Status Charge Trigger Charge Type   [id number]  Pet/ct Skull Base To Mid Thigh [[id number]] 30072 Filed - [facility] Billing Imaging end exam Technical   [id number] [facility] THERAPY HYPERTHYROID SUBSEQUENT [address] (STR)  Imaging result study Professional   Order Report     Order Details        PROCEDURES:   Bone marrow biopsy 9/29/2021  Normocellular marrow flow cytometry unremarkable. No monoclonal plasma cells detected. Bronch/EBUS 10/22/21  This included BAL of the right middle lobe. Normal airways. Mediastinal lymphadenopathy. Complete ultrasound lymph node exam performed including stations 2, 4, 7, 10 and 11. Station 7 lymph nodes were heterogeneous in appearance, irregular and enlarged with calcifications. 12 passes on station 7. Airways are normal.  Abnormal appearance of station 7 lymph nodes, normal lymphocytes seen no malignancy identified. FINAL RESULTS:    A.  Bronchoalveolar lavage:                  No malignant cells seen.                  Alveolar macrophages.                   B.  Lymph node, station 7, fine-needle aspirate:                  No malignant cells seen.                  Benign appearing lymphoid sample. Lymph node bx [facility] 8/24/21  Case Report   Surgical Pathology Report                         Case: [id number]                                   Authorizing Provider: Trav Pathak MD              Collected:           08/24/2021 03:19 PM           Ordering Location:     [facility]               Received:            08/24/2021 04:27 PM           Pathologist:           Ishan Morton MD                                                             Specimens:   A) - SURG PATH, left neck level II LN for lymphoma work up                                          B) - SURG PATH, left neck level II LN                                                       Clinical History      Preop Diagnosis:  Please perform core biopsy of left level II lymph node - will need to send tissue for flow for workup for possible lymphoma. Associated Diagnosis:  Follow-up, Z09. Pathologic Diagnosis      A. Lymph node, left neck level II, biopsy:  · Plasma cell neoplasm, see comment.         Electronically signed by Bessie Lyon MD on 8/27/2021 at 11:54 AM    Diagnosis Comments      The biopsy shows effaced lymph node with infiltrations of sheets of mature plasma cells admixed with small lymphocytes. Touch print shows similar morphology. The plasma cells are positive for WtAX31z,  and is Kappa+ light chain restricted ([place]). They are negative for CD5, Cyclin D1, Cd20, Cd117, and Lambda-WOJCIECH. The background small lymphocytes are positive for T cells (30%), and a fewer B-cells (10%). The Ki67 show approx. 10-20% positivity and most are those reactive T-cells. The findings are consistent with Plasmacytoma. A differential diagnosis of lymphoplasmacytic lymphoma or marginal zone lymphoma is considered but given the background of few B-cells these findings are unlikely. Correlation with IgH and molecular study is recommended. Correlation with systemic examination and serum test is recommended to rule out multiple myeloma.      All controls show appropriate reactivity.     All immunohistochemistry, in situ hybridization, and histochemical tests were developed by and are performed at the [address] [place] [address] Laboratory, Luiza Gregory [address] [place] [address], [address] [place] [address]. All tests reported here, except those addressing HER2 overexpression as a predictive marker, have not been cleared by or approved by the [employer] and Drug Administration (FDA). The laboratory is regulated under CLIA as qualified to perform high-complexity testing. The tests are used for clinical purposes. They should not be regarded as investigational or for research.          Addendum      IHC for CD3 highlights T-cells similar to those of Cd5. MUM1 are positive on the plasma cells similar to those of Cd138. The Lambda-WOJCIECH show few plasma cells and K:L ratio>20:1.   The findings confirm the final diagnosis.      All controls show appropriate reactivity.     All immunohistochemistry, in situ hybridization, and histochemical tests were developed by and are performed at the [address] [place] [address] Laboratory, Luiza Gregory [address] [place] [address], [address] [place] [address]. All tests reported here, except those addressing HER2 overexpression as a predictive marker, have not been cleared by or approved by the [employer] and Drug Administration (FDA). The laboratory is regulated under CLIA as qualified to perform high-complexity testing. The tests are used for clinical purposes. They should not be regarded as investigational or for research. Addendum electronically signed by Richy Fletcher MD on 8/31/2021 at  9:28 AM    Addendum 2      IgH PCR shows predominant monoclonal patterns, consistent with the presence of a clonalm B-cell or plasma cell population. The final diagnosis with differential diagnosis remains unchanged. Addendum electronically signed by Richy Fletcher MD on 9/2/2021 at  8:12 AM    Microscopic Description      A microscopic examination was performed. Synoptic report  Specimen Site: Lymph node, left neck level II  Procedure: Biopsies  Diagnosis:  Plasma cell neoplasm, see comment.     Microscopic description: The H&E section reveal a completely effaced lymphoid tissue with diffuse infiltrate with predominantly plasma cells admixed with small lymphoid cells. There are no large atypical lymphocytes present.      --Ancillary Studies--  Flow cytometry: Not performed  Molecular studies: Submitted for IgH PCR and result will be followed in the addendum. Special stains: Not performed  Immunohistochemistry (IHC)/in situ hybridization (WOJCIECH):     Neoplastic cells are positive for: CD19, , and Kappa WOJCIECH and plasma cell antigen (Vs38C). Neoplastic cells are negative for: CD5, CD10, CD20, PAX5, BCL1, Lambda WOJCIECH, and Ki67 (10% by manual quantification method)  Other IHC findings: see comment.  IHC for Kappa myeloma. He is asymptomatic. Bone marrow biopsy is uninvolved histologically and by flow cytometry. PET scan does not show findings suggestive of myeloma in particular no bone lesions. The findings of mediastinal node uptake is unexpected and needs [facility] EBUS to work this up further. Indeterminate 9 mm left apical lesion as well. [facility] EBUS report reviewed and showed normal airways nodes unremarkable other than the station 7 subcarinal node. Both the bronchoalveolar lavage and the needle aspiration of the subcarinal node show no malignancy. Therefore, status radiation completed January 2022 for treatment of what appears to be an isolated left neck plasmacytoma. 2) Prognosis / Disease Status: Excellent of a solitary plasmacytoma. Definition will require less than 10% plasma cells in the marrow. Depending the results of his mediastinal lymph node subcarinal biopsy he may and be referred for radiation to the extra medullary plasmacytoma in the neck. Soft tissue plasmacytomas have a higher cure rate than those involving bone. 3) Work-up:    Labs: 4/26 labs pending   Imaging: Chest CT without contrast ordered to follow-up on his apical lung nodule and mediastinal nodes creatinine 1.4 therefore noncontrast study ordered. May need PET scan depending on findings. Procedures: Bone marrow aspiration biopsy for ruling out myeloma. Results reviewed and are negative. Consults: Dr. General Sosa pulmonary for bronchoscopy EBUS specifically to biopsy the subcarinal node. This was done 10/22. See above. Airways normal lymph nodes normal other than station 7. Station 7 biopsy/cytology benign and BAL benign. If negative choices include follow-up chest CT imaging in 2 months versus mediastinoscopy. Preference simply to repeat the chest CT in 2 months. Other: Light chain MGUS. 4) Symptom Management: None needed      5) Supportive care provided. Level of care is appropriate. Teaching done today. Reviewed the differential plasmacytoma. Reviewed the findings on the PET scan. His sentinel nodes on sampling were negative last year. 6) Treatment goal: Cure      Treatment plan:     Completed radiation the left neck solitary plasmacytoma by Dr. Rosanna Starr. 7) Medications reviewed. Prescriptions today: None            No orders of the defined types were placed in this encounter. [place]ARRS:  Controlled Substance Monitoring:    Acute and Chronic Pain Monitoring:   No flowsheet data found. 8) Research Options:   None at this time      9) Other:        None    10) Follow Up: Follow-up with me in 3 weeks.       Nery Jansen MD

## 2022-04-26 NOTE — PATIENT INSTRUCTIONS
Labs today  Schedule CT of the chest without contrast within the next couple weeks  Routine follow-up with me in 3 weeks.

## 2022-04-29 LAB — KAPPA/LAMBDA FREE LIGHT CHAINS: NORMAL

## 2022-04-30 LAB — IMMUNOFIXATION WITH QUANT: NORMAL

## 2022-05-12 ENCOUNTER — HOSPITAL ENCOUNTER (OUTPATIENT)
Dept: CT IMAGING | Age: 82
Discharge: HOME | End: 2022-05-12
Payer: MEDICARE

## 2022-05-12 ENCOUNTER — HOSPITAL ENCOUNTER (OUTPATIENT)
Dept: GENERAL RADIOLOGY | Age: 82
Discharge: HOME | End: 2022-05-12

## 2022-05-12 ENCOUNTER — HOSPITAL ENCOUNTER (OUTPATIENT)
Dept: CT IMAGING | Age: 82
Discharge: HOME | End: 2022-05-12

## 2022-05-12 DIAGNOSIS — R59.0 MEDIASTINAL ADENOPATHY: ICD-10-CM

## 2022-05-12 DIAGNOSIS — Z00.6 ENCOUNTER FOR EXAMINATION FOR NORMAL COMPARISON AND CONTROL IN CLINICAL RESEARCH PROGRAM: ICD-10-CM

## 2022-05-12 PROCEDURE — 71250 CT THORAX DX C-: CPT

## 2022-05-13 VITALS
WEIGHT: 208 LBS | DIASTOLIC BLOOD PRESSURE: 70 MMHG | BODY MASS INDEX: 29.78 KG/M2 | RESPIRATION RATE: 18 BRPM | OXYGEN SATURATION: 97 % | SYSTOLIC BLOOD PRESSURE: 132 MMHG | HEIGHT: 70 IN | HEART RATE: 67 BPM | TEMPERATURE: 97.5 F

## 2022-05-13 PROBLEM — E66.811 OBESITY (BMI 30.0-34.9): Status: ACTIVE | Noted: 2021-08-12

## 2022-05-13 PROBLEM — E66.9 OBESITY (BMI 30.0-34.9): Status: ACTIVE | Noted: 2021-08-12

## 2022-05-13 RX ORDER — METOPROLOL SUCCINATE 25 MG/1
25 TABLET, EXTENDED RELEASE ORAL DAILY
COMMUNITY
End: 2022-10-10

## 2022-05-13 RX ORDER — METOPROLOL SUCCINATE 25 MG/1
TABLET, EXTENDED RELEASE ORAL
COMMUNITY
Start: 2022-02-21 | End: 2022-10-10 | Stop reason: SDUPTHER

## 2022-05-13 RX ORDER — LISINOPRIL 10 MG/1
TABLET ORAL
COMMUNITY
Start: 2022-02-21 | End: 2022-10-10 | Stop reason: SDUPTHER

## 2022-05-18 ENCOUNTER — OFFICE VISIT (OUTPATIENT)
Dept: ONCOLOGY | Age: 82
End: 2022-05-18
Payer: MEDICARE

## 2022-05-18 ENCOUNTER — HOSPITAL ENCOUNTER (OUTPATIENT)
Dept: INFUSION THERAPY | Age: 82
Discharge: HOME | End: 2022-05-18
Payer: MEDICARE

## 2022-05-18 VITALS
HEART RATE: 78 BPM | TEMPERATURE: 98.2 F | DIASTOLIC BLOOD PRESSURE: 80 MMHG | RESPIRATION RATE: 16 BRPM | SYSTOLIC BLOOD PRESSURE: 148 MMHG

## 2022-05-18 VITALS
WEIGHT: 203 LBS | RESPIRATION RATE: 16 BRPM | HEIGHT: 70 IN | BODY MASS INDEX: 29.06 KG/M2 | TEMPERATURE: 98.2 F | SYSTOLIC BLOOD PRESSURE: 148 MMHG | OXYGEN SATURATION: 97 % | DIASTOLIC BLOOD PRESSURE: 80 MMHG | HEART RATE: 78 BPM

## 2022-05-18 DIAGNOSIS — C90.22 EXTRAMEDULLARY PLASMACYTOMA IN RELAPSE (HCC): Primary | ICD-10-CM

## 2022-05-18 DIAGNOSIS — R59.9 ADENOPATHY: ICD-10-CM

## 2022-05-18 DIAGNOSIS — D47.2 MGUS (MONOCLONAL GAMMOPATHY OF UNKNOWN SIGNIFICANCE): ICD-10-CM

## 2022-05-18 PROCEDURE — G8417 CALC BMI ABV UP PARAM F/U: HCPCS | Performed by: INTERNAL MEDICINE

## 2022-05-18 PROCEDURE — 99211 OFF/OP EST MAY X REQ PHY/QHP: CPT

## 2022-05-18 PROCEDURE — G8427 DOCREV CUR MEDS BY ELIG CLIN: HCPCS | Performed by: INTERNAL MEDICINE

## 2022-05-18 PROCEDURE — 1036F TOBACCO NON-USER: CPT | Performed by: INTERNAL MEDICINE

## 2022-05-18 PROCEDURE — 99213 OFFICE O/P EST LOW 20 MIN: CPT | Performed by: INTERNAL MEDICINE

## 2022-05-18 PROCEDURE — 4040F PNEUMOC VAC/ADMIN/RCVD: CPT | Performed by: INTERNAL MEDICINE

## 2022-05-18 PROCEDURE — 1123F ACP DISCUSS/DSCN MKR DOCD: CPT | Performed by: INTERNAL MEDICINE

## 2022-05-18 NOTE — PATIENT INSTRUCTIONS
Follow-up with me in 3 months. 8/18/22  On 8/8/22 he will have myeloma labs and CT of the neck with contrast.

## 2022-05-18 NOTE — PROGRESS NOTES
[address] CANCER [address] [place] [address]  Dept: [phone]  Loc: [phone]   Hematology/Oncology Consult (Clinic)        4/26/2022    Joy Rakel   1940     No ref. provider found [facility]  Shay Bergeron DO PCP is in [place]. DIAGNOSIS:  -Extramedullary plasmacytoma left neck (biopsy and t path at [facility]) 8/24/2021. Bone marrow biopsy-uninvolved. IgA 259/normal [id number]/minimally elevated, IgM 93/normal  Beta-2 microglobulin 3.1/mildly elevated  Serum immunofixation shows normal pattern with no monoclonal proteins. Serum free kappa light chains 84.82, lambda 37.03, ratio elevated at 2.29 (0.26-1.65)    -Abnormal PET scan (10/7/2021):  with multiple FDG avid mediastinal nodes. Representative subcarinal node 1.9 cm short axis with SUV of 4.9. Nonenlarged left hilar node with SUV of 3.2. Left inguinal node measuring 1 cm short axis with SUV of 4.6. Not palpable on exam.  Indeterminate 9 mm left apical lung nodule. No bone lesions. 10/22/2021 Dr. Rosario Scrape EBUS-unremarkable although final cytology from station 7 core biopsy pending     -Indeterminate 9 mm left apex lung nodule mildly FDG avid with SUV of 1.8. TREATMENT:  - completed radiation to the left neck per Dr. Sravani Sofia 1/12/2022.  -s/p  [facility] EBUS for histology of the mediastinal nodes. Subcarinal node likely the best target. Path including subcarinal node all benign. PARAMETERS:  -PET/CT 10/7/2021  -Left neck exam; node excised. -Myeloma parameters ; serum light chains with elevated ratio. SUBJECTIVE: Patient is asymptomatic and feels great. Here for routine follow-up. No night sweats fevers or weight loss. Sense of wellbeing is great. Here for results of his recent updated chest CT.    HPI: 79 yo male with L neck mass with recent [facility] bx reveaaling a plasma cell neoplasm/ possible plasmacytoma.   Patient cancelled heme appt  due on  as he wanted all care closer to home. Otolaryngology care per Dr Andres Fernando at [facility]. Patient had  Covid on , outpt mngmt and recovered. April noted lump L neck under the jaw. Since core bx has been done, he states the mass is much smaller. Since April ,legs a bit weaker bilaterally but no pain except in knees. Overall describes a sense of wellbeing is stable and unchanged. Denies any drenching sweats fevers or weight loss. Denies any midline back pain or pain in his upper arms or legs. Denies any frequent or severe infections. He had Covid vaccination done in April. ROS:  Review of Systems 14 point negative except as detailed above. PMH:   Past Medical History:   Diagnosis Date    CAD (coronary artery disease)     Enlarged lymph nodes     Heart disease     Hyperlipidemia     Hypertension     Plasmacytoma ([address]     Stents x 2 . No MI,angina ,chf  Denies history of diabetes or stroke.     Social HX:   Social History     Socioeconomic History    Marital status:      Spouse name: Lord Boudreaux Number of children: 1    Years of education: Not on file    Highest education level: Not on file   Occupational History    Not on file   Tobacco Use    Smoking status: Former Smoker     Packs/day: 1.00     Years: 37.00     Pack years: 37.00     Types: Cigarettes     Start date: 1964     Quit date: 2001     Years since quittin.6    Smokeless tobacco: Never Used   Vaping Use    Vaping Use: Never used   Substance and Sexual Activity    Alcohol use: Not Currently     Comment: beer every so often     Drug use: Never    Sexual activity: Not on file   Other Topics Concern    Not on file   Social History Narrative    Not on file     Social Determinants of Health     Financial Resource Strain:     Difficulty of Paying Living Expenses: Not on file   Food Insecurity:     Worried About [address] in the Last Year: Not on file    [address] in the Last Year: Not on file   Transportation Needs:     Lack of Transportation (Medical): Not on file    Lack of Transportation (Non-Medical): Not on file   Physical Activity:     Days of Exercise per Week: Not on file    Minutes of Exercise per Session: Not on file   Stress:     Feeling of Stress : Not on file   Social Connections:     Frequency of Communication with Friends and Family: Not on file    Frequency of Social Gatherings with Friends and Family: Not on file    Attends [employer] Services: Not on file    Active Member of [address] [place] [address] [employer] or Organizations: Not on file    Attends Club or Organization Meetings: Not on file    Marital Status: Not on file   Intimate Partner Violence:     Fear of Current or Ex-Partner: Not on file    Emotionally Abused: Not on file    Physically Abused: Not on file    Sexually Abused: Not on file   Housing Stability:     Unable to Pay for Housing in the Last Year: Not on file    Number of [place] in the Last Year: Not on file    Unstable Housing in the Last Year: Not on file      [address] [facility], [facility]. 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   Marimiguel Jhaveri    Phone: [phone] [facility] [address]     Employment:  Retired [employer]    Immunizations:  Immunization History   Administered Date(s) Administered    COVID-19, Eddie Goodwin, Primary or Immunocompromised, PF, 100mcg/0.5mL 11/17/2021      Covid and pneumonia vaccine    Health Screenings:    C-Scope: 15 yrs  Prostate: ok          Health Maintenance Due   Topic Date Due    Annual Wellness Visit (AWV)  Never done    Pneumococcal 65+ years Vaccine (1 - PCV) Never done    Lipids  Never done    Depression Screen  Never done    DTaP/Tdap/Td vaccine (1 - Tdap) Never done    Shingles vaccine (1 of 2) Never done    COVID-19 Vaccine (2 - Moderna risk 4-dose series) 12/15/2021        Interests:   Not reviewed    Fam HX:   No Cancer hx    Hospitalizations:   None recent    Allergies: Allergies   Allergen Reactions    No Known Allergies         Adult Illness:  Patient Active Problem List   Diagnosis    Extramedullary plasmacytoma ([address]    Obesity (BMI 30.0-34.9)    see above    Surgery:  Past Surgical History:   Procedure Laterality Date    ABSCESS DRAINAGE      SPIDER BITE    BRONCHOSCOPY N/A 10/22/2021    BRONCHOSCOPY W/EBUS FNA performed by Raegan Sen MD at [place] [address]      COLONOSCOPY      CT BONE MARROW BIOPSY  2021    CT BONE MARROW BIOPSY 2021 [facility] CT SCAN    HERNIA REPAIR  2016        Medications:  Current Outpatient Medications   Medication Sig Dispense Refill    metoprolol succinate (TOPROL XL) 25 MG extended release tablet TAKE 1 TABLET BY MOUTH ONCE DAILY      metoprolol succinate (TOPROL XL) 25 MG extended release tablet Take 25 mg by mouth daily      PEDIATRIC MULTIVIT-MINERALS-C PO Take by mouth      LORATADINE ALLERGY RELIEF PO Take by mouth      lisinopril (PRINIVIL;ZESTRIL) 10 MG tablet TAKE 1 TABLET BY MOUTH ONCE DAILY      acetaminophen (TYLENOL) 325 MG tablet Take 650 mg by mouth every 6 hours as needed for Pain      OMEPRAZOLE PO Take by mouth      cetirizine (ZYRTEC) 10 MG tablet Take 10 mg by mouth daily      Multiple Vitamins-Minerals (THERAPEUTIC MULTIVITAMIN-MINERALS) tablet Take 1 tablet by mouth daily      pravastatin (PRAVACHOL) 80 MG tablet Take 80 mg by mouth daily.  metoprolol (LOPRESSOR) 25 MG tablet Take 25 mg by mouth daily       aspirin 81 MG tablet Take 81 mg by mouth daily. No current facility-administered medications for this visit. EXAM:    BP (!) 148/80 (Site: Left Upper Arm, Position: Sitting, Cuff Size: Medium Adult)   Pulse 78   Temp 98.2 °F (36.8 °C) (Oral)   Resp 16   Ht 5' 10\" (1.778 m)   Wt 203 lb (92.1 kg)   SpO2 97%   BMI 29.13 kg/m²      ECO  General: Non-ill appearing.   Appears younger than his stated age.  HEENT: NC/AT,nonicteric, perrla,eom intact, no mucosal lesions, mildly hard of hearing. Neck: normal thyroid, no masses. pulses nl, no bruits,   Nodes: No adenopathy. Lung: No wheezing , clear  CV: rrr, no rubs ,gallops or murmurs  Breasts: normal exam  Abd/Rectal: soft, non-tender,bowel sounds normal , no HSM,no masses  Back: normal curvature, No midline tenderness. flanks nontender  : Not Examined  Extremities: no cyanosis,clubbing or edema. Skin: unremarkable  Neuro: A and O x 4, CN exam nonfocal, Motor- no deficits, Sensory- no deficits, gait-nl, speech- fluent, no ataxia. Devices: none      DATA:    LAB:     See 9/23 labs above including IgA 259, IgG 1961, IgM 93, beta-2 microglobulin 3.1  SPEP and serum immune fixation-no monoclonal protein  Serum free kappa light chains 84.8, lambda 37.0, ratio 2.29 (0.26-1.65)    4/26/2022 labs showed a serum protein electrophoresis that was normal and immunofixation with no monoclonal proteins. Kappa lambda light chain 38.5, lambda 22.1 ratio 1.74. IMAGING:  PROCEDURE: CT CHEST WO CONTRAST 5/12/2022       CLINICAL INFORMATION: Mediastinal adenopathy .           TECHNIQUE: 2-D multiplanar noncontrast CT chest at 5 mm intervals   All CT scans at this facility use dose modulation, iterative reconstruction, and/or weight-based dosing when appropriate to reduce radiation dose to as low as reasonably achievable.       COMPARISON: PET CT 10/7/2021           FINDINGS: Evaluation for adenopathy is limited without IV contrast.    No mediastinal, hilar, or axillary lymphadenopathy is currently identified.    Borderline sized left hilar node measuring 9 mm. Previously seen mediastinal pretracheal and precarinal nodes measure 6 mm short axis and 4 mm short axis. Subcarinal node measures 9 mm short axis. Subcarinal node previously measured 12 mm. There are calcified subcarinal and left hilar lymph nodes. Heart size is normal. There is no pericardial effusion.  There is aneurysmal dilatation of the ascending aorta up to 4.3 cm in AP diameter at the right pulmonary artery. Lungs    Irregular linear spiculated density left apex measuring 9 mm. This is contiguous with a focal ground glass opacity measuring 11 mm. The groundglass opacity has slightly increased from the prior exam.    There are calcified granulomas in both lungs. There are no airspace consolidations or pleural effusions.       Upper abdomen   Moderate-sized hiatal hernia. Cholelithiasis. There are no suspicious abnormalities.       MSK no suspicious bone lesions.           Impression   Irregular linear nodular density left apex with adjacent subsolid nodule/focal ground glass opacity. This opacity has minimally increased since the prior exam   Multiple nonenlarged mediastinal, subcarinal and left hilar nodes.               **This report has been created using voice recognition software.  It may contain minor errors which are inherent in voice recognition technology. **             PROCEDURE: PET CT SKULL BASE TO MID THIGH    10/7/21       CLINICAL INFORMATION: 81-year-old man with extra medullary plasmacytoma in the left neck; initial treatment strategy.       Radiopharmaceutical: 15.6 mCi F-18 FDG, intravenously.       TECHNIQUE: PET/CT imaging was performed from the skull base to the midthigh levels using routine PET acquisition.       Injection site: Left antecubital fossa.       Time of FDG injection: 8:51 AM.       Serum glucose: 110 mg/dL at 8:49 AM.       Time of imagin:58 AM.       COMPARISON: None       FINDINGS:        Neck: Confusion confluent left-sided level 2 cervical lymph nodes measure 1.2 cm in short axis diameter and are associated with a maximum SUV of 2.3 (image 50).     Chest: Cardiac size is normal. Atherosclerotic calcifications are present in the thoracic aorta and coronary arteries.  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 There is no pleural or    pericardial effusion. Calcified granulomas are present in the bilateral lungs. There is scarring at the bilateral lung apices. An indistinct 0.9 cm density in the left lung apex has a maximum SUV of 1.8 (image 94). Minimal alveolar opacities at the    bilateral lung bases are likely infectious or inflammatory. There are multiple FDG avid mediastinal lymph nodes. A representative subcarinal lymph node measures 1.9 cm in short axis diameter and has a maximum SUV of 4.9 (image 115). A nonenlarged left    hilar lymph node has a maximum SUV of 3.2 (image 108). There is no FDG avid axillary lymphadenopathy. Activity associated with a small hiatal hernia may be infectious or inflammatory. Degenerative changes are present in the thoracic spine without    evidence of hypermetabolic osseous metastatic disease. Periarticular activity at the bilateral shoulders is likely degenerative.       Abdomen/pelvis: Physiologic activity is present in the liver, spleen, urinary collecting system and gastrointestinal tract. There is a calcification in the lumen of the gallbladder. There is mild fatty replacement of the pancreas. Atherosclerotic    calcific lesions are present in the abdominal aorta without evidence of aneurysm. There are diverticula in sigmoid colon without evidence of diverticulitis. The prostate gland is enlarged and contains calcifications. There is no FDG avid mesenteric,    retroperitoneal or pelvic lymphadenopathy. A left inguinal lymph node which measures 1.0 cm in short axis diameter has a maximum SUV of 4.6 (image 268). Degenerative changes are seen in the lumbar spine and pelvis without evidence of hypermetabolic    osseous metastatic disease.           Impression   1. Confluent mildly FDG avid left cervical lymphadenopathy which likely corresponds to known malignancy. 2. FDG avid mediastinal, left hilar and left inguinal lymphadenopathy suspicious for metastatic disease. 3. Small, mildly FDG avid density at the left lung apex of indeterminant etiology. Malignancy cannot be excluded.       Final report electronically signed by Dr. Yessi Colón on 10/7/2021 11:13 AM       Order History    Open Order Details   PACS Images     Show images for PET CT SKULL BASE TO MID THIGH   Results History Report    View Report   Associated Diagnoses    Extramedullary plasmacytoma not having achieved remission [facility])       External Result Report    External Result Report   Existing Charges    Charge Line Charge Code Status Charge Trigger Charge Type   [id number]  Pet/ct Skull Base To Mid Thigh [[id number]] [address] Billing Imaging end exam Technical   [id number] [facility] THERAPY HYPERTHYROID SUBSEQUENT [address] (STR)  Imaging result study Professional   Order Report     Order Details        PROCEDURES:   Bone marrow biopsy 9/29/2021  Normocellular marrow flow cytometry unremarkable. No monoclonal plasma cells detected. Bronch/EBUS 10/22/21  This included BAL of the right middle lobe. Normal airways. Mediastinal lymphadenopathy. Complete ultrasound lymph node exam performed including stations 2, 4, 7, 10 and 11. Station 7 lymph nodes were heterogeneous in appearance, irregular and enlarged with calcifications. 12 passes on station 7. Airways are normal.  Abnormal appearance of station 7 lymph nodes, normal lymphocytes seen no malignancy identified. FINAL RESULTS:    A.  Bronchoalveolar lavage:                  No malignant cells seen.                  Alveolar macrophages.                   B.  Lymph node, station 7, fine-needle aspirate:                  No malignant cells seen.                  Benign appearing lymphoid sample.      Lymph node bx [facility] 8/24/21  Case Report   Surgical Pathology Report                         Case: [id number]                                   Authorizing Provider: Kady Mckeon MD              Collected:           08/24/2021 03:19 PM           Ordering Location:     [facility]               Received:            08/24/2021 04:27 PM           Pathologist:           Bishop Britton MD                                                             Specimens:   A) - SURG PATH, left neck level II LN for lymphoma work up                                          B) - SURG PATH, left neck level II LN                                                       Clinical History      Preop Diagnosis:  Please perform core biopsy of left level II lymph node - will need to send tissue for flow for workup for possible lymphoma. Associated Diagnosis:  Follow-up, Z09. Pathologic Diagnosis      A. Lymph node, left neck level II, biopsy:  · Plasma cell neoplasm, see comment.         Electronically signed by Bishop Britton MD on 8/27/2021 at 11:54 AM    Diagnosis Comments      The biopsy shows effaced lymph node with infiltrations of sheets of mature plasma cells admixed with small lymphocytes. Touch print shows similar morphology. The plasma cells are positive for NqOX36t,  and is Kappa+ light chain restricted (Luc Szymanski). They are negative for CD5, Cyclin D1, Cd20, Cd117, and Lambda-WOJCIECH. The background small lymphocytes are positive for T cells (30%), and a fewer B-cells (10%). The Ki67 show approx. 10-20% positivity and most are those reactive T-cells. The findings are consistent with Plasmacytoma. A differential diagnosis of lymphoplasmacytic lymphoma or marginal zone lymphoma is considered but given the background of few B-cells these findings are unlikely. Correlation with IgH and molecular study is recommended.   Correlation with systemic examination and serum test is recommended to rule out multiple myeloma.      All controls show appropriate reactivity.     All immunohistochemistry, in situ hybridization, and histochemical tests were developed by and are performed at the [facility], [address]. All tests reported here, except those addressing HER2 overexpression as a predictive marker, have not been cleared by or approved by the [employer] and Drug Administration (FDA). The laboratory is regulated under CLIA as qualified to perform high-complexity testing. The tests are used for clinical purposes. They should not be regarded as investigational or for research.          Addendum      IHC for CD3 highlights T-cells similar to those of Cd5. MUM1 are positive on the plasma cells similar to those of Cd138. The Lambda-WOJCIECH show few plasma cells and K:L ratio>20:1. The findings confirm the final diagnosis.      All controls show appropriate reactivity.     All immunohistochemistry, in situ hybridization, and histochemical tests were developed by and are performed at the [address] [place] [address] Laboratory, [address]. All tests reported here, except those addressing HER2 overexpression as a predictive marker, have not been cleared by or approved by the [employer] and Drug Administration (FDA). The laboratory is regulated under CLIA as qualified to perform high-complexity testing. The tests are used for clinical purposes. They should not be regarded as investigational or for research. Addendum electronically signed by Miguelina Millard MD on 8/31/2021 at  9:28 AM    Addendum 2      IgH PCR shows predominant monoclonal patterns, consistent with the presence of a clonalm B-cell or plasma cell population. The final diagnosis with differential diagnosis remains unchanged. Addendum electronically signed by Miguelina Millard MD on 9/2/2021 at  8:12 AM    Microscopic Description      A microscopic examination was performed.    Synoptic report  Specimen Site: Lymph node, left neck level II  Procedure: Biopsies  Diagnosis:  Plasma cell neoplasm, see comment.     Microscopic description: The H&E section reveal a completely effaced lymphoid tissue with diffuse infiltrate with predominantly plasma cells admixed with small lymphoid cells. There are no large atypical lymphocytes present.      --Ancillary Studies--  Flow cytometry: Not performed  Molecular studies: Submitted for IgH PCR and result will be followed in the addendum. Special stains: Not performed  Immunohistochemistry (IHC)/in situ hybridization (WOJCIECH):     Neoplastic cells are positive for: CD19, , and Kappa WOJCIECH and plasma cell antigen (Vs38C). Neoplastic cells are negative for: CD5, CD10, CD20, PAX5, BCL1, Lambda WOJCIECH, and Ki67 (10% by manual quantification method)  Other IHC findings: see comment. IHC for Kappa and Lambda is unremarkable on both B and plasma cells. CD10 is virtually negative on the biopsy. CD20 and PAX5 show a few B-cell clusters (<10%) and they are negative for Cd5, Cd10, and BCL1.       HEME Mutation Panel  Inactivating and splice site TET2 mutations detected. No MYD88, CARD11, CXCR4, KLF2, NOTCH1, NOTCH2 or any other mutations associated wtih specific lymphoma types are noted.     en cleared by or approved by the [employer] and Drug Administration (FDA). The laboratory is regulated under CLIA as qualified to perform high-complexity testing. The tests are used for clinical purposes. They should not be regarded as investigational or for research. Images          For Immediate Release to Patient's MyChart? Yes  NoAbnormal         [address] [facility]   Lab and Collection        PATHOLOGY:   Clinical History 7/22/21      Mass left parotid. *CYTOLOGIC DIAGNOSIS*      A.  LEFT PAROTID MASS, FNA (CYTOLOGY AND CELL BLOCK):      FINAL DIAGNOSIS:  · Non Diagnostic  · Normal Salivary Gland Elements Only        FNA Performed By:  Clinician       Electronically signed by Darrin Méndez MD on 7/23/2021 at  4:57 PM      Clinical Information: Ke Tubbs 2540 REMISSION, C90.20      9/29/21    FINAL DIAGNOSIS:   Bone marrow core, clot, and aspirate smear, site unspecified:    Normocellular bone marrow (30-40%) with orderly trilineage   hematopoiesis      and 2% polyclonal mature plasma cells.    No significant morphologic or flow cytometric evidence of a monoclonal       plasma cell neoplasm.    Adequate iron stores.    Peripheral blood: mild anemia. Bronch/EBUS Dr Nikole Maguire 10/22/21    GENETICS:  See above    MOLECULAR:  See above    ASSESSMENT/PLAN:    1: Diagnosis: 46-year-old gentleman with a left neck mass on core biopsy at [facility] favors a plasmacytoma. Patient needs thorough evaluation to rule out myeloma. He is asymptomatic. Bone marrow biopsy is uninvolved histologically and by flow cytometry. PET scan does not show findings suggestive of myeloma in particular no bone lesions. The findings of mediastinal node uptake is unexpected and needs [facility] EBUS to work this up further. Indeterminate 9 mm left apical lesion as well. [facility] EBUS report reviewed and showed normal airways nodes unremarkable other than the station 7 subcarinal node. Both the bronchoalveolar lavage and the needle aspiration of the subcarinal node show no malignancy. Therefore, status radiation completed January 2022 for treatment of what appears to be an isolated left neck plasmacytoma. 2) Prognosis / Disease Status: Excellent of a solitary plasmacytoma. Definition will require less than 10% plasma cells in the marrow. Depending the results of his mediastinal lymph node subcarinal biopsy he may and be referred for radiation to the extra medullary plasmacytoma in the neck. Soft tissue plasmacytomas have a higher cure rate than those involving bone. 3) Work-up:    Labs: 4/26 labs reviewed above. Imaging: Chest CT without contrast ordered to follow-up on his apical lung nodule and mediastinal nodes creatinine 1.4 therefore noncontrast study ordered.   Chest CT of May 12 is stable. Plan next chest CT in 4 months. Order CT of the neck with contrast August 8 before his next follow-up with me in August 18. Procedures: Bone marrow aspiration biopsy for ruling out myeloma. Results reviewed and are negative. Consults: Dr. Jodi Dickinson pulmonary for bronchoscopy EBUS specifically to biopsy the subcarinal node. This was done 10/22. See above. Airways normal lymph nodes normal other than station 7. Station 7 biopsy/cytology benign and BAL benign. If negative choices include follow-up chest CT imaging in 2 months versus mediastinoscopy. Preference simply to repeat the chest CT in 2 months. Other: Light chain MGUS. Stable on April's labs. 4) Symptom Management: None needed      5) Supportive care provided. Level of care is appropriate. Teaching done today. Reviewed the differential plasmacytoma. Reviewed the findings on the PET scan. His sentinel nodes on sampling were negative last year. 6) Treatment goal: Cure      Treatment plan:     Completed radiation the left neck solitary plasmacytoma by Dr. Nick Villalpando. 7) Medications reviewed. Prescriptions today: None            No orders of the defined types were placed in this encounter. OARRS:  Controlled Substance Monitoring:    Acute and Chronic Pain Monitoring:   No flowsheet data found. 8) Research Options:   None at this time      9) Other:        None    10) Follow Up: Follow-up with me on 8/18/2022. Today ordered labs i.e. myeloma labs and neck CT with contrast to be done on August 8. Next chest CT will be due in October.       Shantel Wagner MD

## 2022-06-13 ENCOUNTER — TELEPHONE (OUTPATIENT)
Dept: ONCOLOGY | Age: 82
End: 2022-06-13

## 2022-06-13 DIAGNOSIS — M54.2 NECK PAIN: ICD-10-CM

## 2022-06-13 DIAGNOSIS — C90.31 SOLITARY PLASMACYTOMA IN REMISSION (HCC): Primary | ICD-10-CM

## 2022-06-13 NOTE — TELEPHONE ENCOUNTER
Patient called and left message-states he has been having some pain/stiffness on the left side of his neck. No swelling noted. At first he just thought maybe he slept wrong-but now that it has been a couple  weeks and he is still having the pain, he is wandering if he needs  to come in and see you? Patient is scheduled for a CT of the neck 8/8/22 and to see you on 8/18/22. Please advise patient.  [phone]

## 2022-06-14 NOTE — TELEPHONE ENCOUNTER
Ordered neck CT with contrast.  Please schedule within the next week or so. Follow-up with nurse practitioner several days after neck CT done.

## 2022-06-15 ENCOUNTER — TELEPHONE (OUTPATIENT)
Dept: ONCOLOGY | Age: 82
End: 2022-06-15

## 2022-06-20 ENCOUNTER — HOSPITAL ENCOUNTER (OUTPATIENT)
Dept: CT IMAGING | Age: 82
Discharge: HOME | End: 2022-06-20
Payer: MEDICARE

## 2022-06-20 DIAGNOSIS — D47.2 MGUS (MONOCLONAL GAMMOPATHY OF UNKNOWN SIGNIFICANCE): ICD-10-CM

## 2022-06-20 DIAGNOSIS — R59.9 ADENOPATHY: ICD-10-CM

## 2022-06-20 DIAGNOSIS — C90.22 EXTRAMEDULLARY PLASMACYTOMA IN RELAPSE (HCC): ICD-10-CM

## 2022-06-20 LAB — POC CREATININE WHOLE BLOOD: 1.4 MG/DL (ref 0.5–1.2)

## 2022-06-20 PROCEDURE — 70491 CT SOFT TISSUE NECK W/DYE: CPT

## 2022-06-20 PROCEDURE — 6360000004 HC RX CONTRAST MEDICATION: Performed by: INTERNAL MEDICINE

## 2022-06-20 PROCEDURE — 82565 ASSAY OF CREATININE: CPT

## 2022-06-20 RX ADMIN — IOPAMIDOL 75 ML: 755 INJECTION, SOLUTION INTRAVENOUS at 14:27

## 2022-08-17 NOTE — PROGRESS NOTES
[address] CANCER [facility] [address] [facility]  Dept: [phone]  Loc: [phone]   Hematology/Oncology Consult (Clinic)        8/18/2022    Brooke Coreas   1940     No ref. provider found [facility]  Shay Bergeron DO PCP is in [place]. 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 Bone marrow biopsy-uninvolved. IgA 259/normal [id number]/minimally elevated, IgM 93/normal  Beta-2 microglobulin 3.1/mildly elevated  Serum immunofixation shows normal pattern with no monoclonal proteins. Serum free kappa light chains 84.82, lambda 37.03, ratio elevated at 2.29 (0.26-1.65)    -Abnormal PET scan (10/7/2021):  with multiple FDG avid mediastinal nodes. Representative subcarinal node 1.9 cm short axis with SUV of 4.9. Nonenlarged left hilar node with SUV of 3.2. Left inguinal node measuring 1 cm short axis with SUV of 4.6. Not palpable on exam.  Indeterminate 9 mm left apical lung nodule. No bone lesions. 10/22/2021 Dr. Rashawn John EBUS-unremarkable although final cytology from station 7 core biopsy pending     -Indeterminate 9 mm left apex lung nodule mildly FDG avid with SUV of 1.8. Ongoing surveillance. TREATMENT:  - completed radiation to the left neck per Dr. Cameron Milligan 1/12/2022.  -s/p  [facility] EBUS for histology of the mediastinal nodes. Subcarinal node likely the best target. Path including subcarinal node all benign. PARAMETERS:  -Neck CT 6/21/2022-no evidence of disease  -PET/CT 10/7/2021  -Left neck exam; node excised. -Myeloma parameters ; serum light chains with elevated ratio. SUBJECTIVE: Patient is asymptomatic and feels great. Here for routine follow-up. No night sweats fevers or weight loss. Sense of wellbeing is great. Here for myeloma primary labs. Reviewed that the next CT in June was unremarkable.   Still needs continued follow-up regarding the left apical opacity and the plan is for a chest CAT scan in early October. Denies any pulmonary complaints. HPI: 79 yo male with L neck mass with recent [facility] bx reveaaling a plasma cell neoplasm/ possible plasmacytoma. Patient cancelled heme appt  due on  as he wanted all care closer to home. Otolaryngology care per Dr Eric Morris at [facility]. Patient had  Covid on , outpt mngmt and recovered. April noted lump L neck under the jaw. Since core bx has been done, he states the mass is much smaller. Since April ,legs a bit weaker bilaterally but no pain except in knees. Overall describes a sense of wellbeing is stable and unchanged. Denies any drenching sweats fevers or weight loss. Denies any midline back pain or pain in his upper arms or legs. Denies any frequent or severe infections. He had Covid vaccination done in April. ROS:  Review of Systems 14 point negative except as detailed above. PMH:   Past Medical History:   Diagnosis Date    CAD (coronary artery disease)     Enlarged lymph nodes     Heart disease     Hyperlipidemia     Hypertension     Plasmacytoma ([address]     Stents x 2 . No MI,angina ,chf  Denies history of diabetes or stroke.     Social HX:   Social History     Socioeconomic History    Marital status:      Spouse name: Olvin Anderson    Number of children: 1    Years of education: Not on file    Highest education level: Not on file   Occupational History    Not on file   Tobacco Use    Smoking status: Former     Packs/day: 1.00     Years: 37.00     Pack years: 37.00     Types: Cigarettes     Start date: 1964     Quit date: 2001     Years since quittin.9    Smokeless tobacco: Never   Vaping Use    Vaping Use: Never used   Substance and Sexual Activity    Alcohol use: Not Currently     Comment: beer every so often     Drug use: Never    Sexual activity: Not on file   Other Topics Concern    Not on file   Social History Narrative    Not on file     Social Determinants of Health     Financial Resource Strain: Not on file   Food Insecurity: Not on file   Transportation Needs: Not on file   Physical Activity: Not on file   Stress: Not on file   Social Connections: Not on file   Intimate Partner Violence: Not on file   Housing Stability: Not on file      [address]. 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 SpouseParmiguel Witt    Phone: [phone] [address] [facility] [address]     Employment:  Retired [employer]    Immunizations:  Immunization History   Administered Date(s) Administered    COVID-19, [address] [facility] border, Primary or Immunocompromised, (age 12y+), IM, 100 mcg/0.5mL 11/17/2021      Covid and pneumonia vaccine    Health Screenings:    C-Scope: 15 yrs  Prostate: ok          Health Maintenance Due   Topic Date Due    Annual Wellness Visit (AWV)  Never done    Pneumococcal 65+ years Vaccine (1 - PCV) Never done    Lipids  Never done    Depression Screen  Never done    DTaP/Tdap/Td vaccine (1 - Tdap) Never done    Shingles vaccine (1 of 2) Never done    COVID-19 Vaccine (2 - Moderna risk series) 12/15/2021        Interests:   Not reviewed    Fam HX:   No Cancer hx    Hospitalizations:   None recent    Allergies:   Allergies   Allergen Reactions    No Known Allergies         Adult Illness:  Patient Active Problem List   Diagnosis    Extramedullary plasmacytoma (HCC)    Obesity (BMI 30.0-34.9)    see above    Surgery:  Past Surgical History:   Procedure Laterality Date    ABCESS DRAINAGE      SPIDER BITE    BRONCHOSCOPY N/A 10/22/2021    BRONCHOSCOPY W/EBUS FNA performed by Amisha Estrella MD at [address] [place] [address]  2004    COLONOSCOPY      CT BONE MARROW BIOPSY  9/29/2021    CT BONE MARROW BIOPSY 9/29/2021 [facility] CT SCAN    HERNIA REPAIR  05/18/2016        Medications:  Current Outpatient Medications   Medication Sig Dispense Refill    metoprolol succinate (TOPROL XL) 25 MG extended release tablet TAKE 1 TABLET BY MOUTH ONCE DAILY      metoprolol succinate (TOPROL XL) 25 MG extended release tablet Take 25 mg by mouth daily      PEDIATRIC MULTIVIT-MINERALS-C PO Take by mouth      LORATADINE ALLERGY RELIEF PO Take by mouth      lisinopril (PRINIVIL;ZESTRIL) 10 MG tablet TAKE 1 TABLET BY MOUTH ONCE DAILY      acetaminophen (TYLENOL) 325 MG tablet Take 650 mg by mouth every 6 hours as needed for Pain      OMEPRAZOLE PO Take by mouth      cetirizine (ZYRTEC) 10 MG tablet Take 10 mg by mouth daily      Multiple Vitamins-Minerals (THERAPEUTIC MULTIVITAMIN-MINERALS) tablet Take 1 tablet by mouth daily      pravastatin (PRAVACHOL) 80 MG tablet Take 80 mg by mouth daily. metoprolol (LOPRESSOR) 25 MG tablet Take 25 mg by mouth daily       aspirin 81 MG tablet Take 81 mg by mouth daily. No current facility-administered medications for this visit. EXAM:    There were no vitals taken for this visit. ECO  General: Non-ill appearing. Appears younger than his stated age. HEENT: NC/AT,nonicteric, perrla,eom intact, no mucosal lesions, mildly hard of hearing. Neck: normal thyroid, no masses. pulses nl, no bruits,   Nodes: No adenopathy. Lung: No wheezing , clear  CV: rrr, no rubs ,gallops or murmurs  Breasts: normal exam  Abd/Rectal: soft, non-tender,bowel sounds normal , no HSM,no masses  Back: normal curvature, No midline tenderness. flanks nontender  : Not Examined  Extremities: no cyanosis,clubbing or edema. Skin: unremarkable  Neuro: A and O x 4, CN exam nonfocal, Motor- no deficits, Sensory- no deficits, gait-nl, speech- fluent, no ataxia. Devices: none      DATA:    LAB:   2022 myeloma parameters including light chains drawn and pending.     See  labs above including IgA 259, IgG 1961, IgM 93, beta-2 microglobulin 3.1  SPEP and serum immune fixation-no monoclonal protein  Serum free kappa light chains 84.8, lambda 37.0, ratio 2.29 (0.26-1.65)    2022 labs has minimally increased since the prior exam   Multiple nonenlarged mediastinal, subcarinal and left hilar nodes. **This report has been created using voice recognition software. It may contain minor errors which are inherent in voice recognition technology. **             PROCEDURE: PET CT SKULL BASE TO MID THIGH    10/7/21       CLINICAL INFORMATION: 42-year-old man with extra medullary plasmacytoma in the left neck; initial treatment strategy. Radiopharmaceutical: 15.6 mCi F-18 FDG, intravenously. TECHNIQUE: PET/CT imaging was performed from the skull base to the midthigh levels using routine PET acquisition. Injection site: Left antecubital fossa. Time of FDG injection: 8:51 AM.       Serum glucose: 110 mg/dL at 8:49 AM.       Time of imagin:58 AM.       COMPARISON: None       FINDINGS:        Neck: Confusion confluent left-sided level 2 cervical lymph nodes measure 1.2 cm in short axis diameter and are associated with a maximum SUV of 2.3 (image 50). Chest: Cardiac size is normal. Atherosclerotic calcifications are present in the thoracic aorta and coronary arteries. 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 There is no pleural or    pericardial effusion. Calcified granulomas are present in the bilateral lungs. There is scarring at the bilateral lung apices. An indistinct 0.9 cm density in the left lung apex has a maximum SUV of 1.8 (image 94). Minimal alveolar opacities at the    bilateral lung bases are likely infectious or inflammatory. There are multiple FDG avid mediastinal lymph nodes. A representative subcarinal lymph node measures 1.9 cm in short axis diameter and has a maximum SUV of 4.9 (image 115). A nonenlarged left    hilar lymph node has a maximum SUV of 3.2 (image 108). There is no FDG avid axillary lymphadenopathy.  Activity associated with a small hiatal hernia may be infectious or inflammatory. Degenerative changes are present in the thoracic spine without    evidence of hypermetabolic osseous metastatic disease. Periarticular activity at the bilateral shoulders is likely degenerative. Abdomen/pelvis: Physiologic activity is present in the liver, spleen, urinary collecting system and gastrointestinal tract. There is a calcification in the lumen of the gallbladder. There is mild fatty replacement of the pancreas. Atherosclerotic    calcific lesions are present in the abdominal aorta without evidence of aneurysm. There are diverticula in sigmoid colon without evidence of diverticulitis. The prostate gland is enlarged and contains calcifications. There is no FDG avid mesenteric,    retroperitoneal or pelvic lymphadenopathy. A left inguinal lymph node which measures 1.0 cm in short axis diameter has a maximum SUV of 4.6 (image 268). Degenerative changes are seen in the lumbar spine and pelvis without evidence of hypermetabolic    osseous metastatic disease. Impression   1. Confluent mildly FDG avid left cervical lymphadenopathy which likely corresponds to known malignancy. 2. FDG avid mediastinal, left hilar and left inguinal lymphadenopathy suspicious for metastatic disease. 3. Small, mildly FDG avid density at the left lung apex of indeterminant etiology. Malignancy cannot be excluded.        Final report electronically signed by Dr. Lito Dumont on 10/7/2021 11:13 AM       Order History    Open Order Details   PACS Images     Show images for PET CT SKULL BASE TO MID THIGH   Results History Report    View Report   Associated Diagnoses    Extramedullary plasmacytoma not having achieved remission [facility])       External Result Report    External Result Report   Existing Charges    Charge Line Charge Code Status Charge Trigger Charge Type   [id number]  Pet/ct Skull Base To Mid Thigh [[id number]] [address] [place] [address] end exam Technical   [id number] NUC THERAPY HYPERTHYROID SUBSEQUENT 05702 Filed - [facility] Billing (STR)  Imaging result study Professional   Order Report     Order Details        PROCEDURES:   Bone marrow biopsy 9/29/2021  Normocellular marrow flow cytometry unremarkable. No monoclonal plasma cells detected. Bronch/EBUS 10/22/21  This included BAL of the right middle lobe. Normal airways. Mediastinal lymphadenopathy. Complete ultrasound lymph node exam performed including stations 2, 4, 7, 10 and 11. Station 7 lymph nodes were heterogeneous in appearance, irregular and enlarged with calcifications. 12 passes on station 7. Airways are normal.  Abnormal appearance of station 7 lymph nodes, normal lymphocytes seen no malignancy identified. FINAL RESULTS:    A.  Bronchoalveolar lavage:                  No malignant cells seen. Alveolar macrophages. B.  Lymph node, station 7, fine-needle aspirate:                  No malignant cells seen. Benign appearing lymphoid sample. Lymph node bx [facility] 8/24/21  Case Report   Surgical Pathology Report                         Case: [id number]                                   Authorizing Provider:  Manuel Chambers MD              Collected:           08/24/2021 03:19 PM           Ordering Location:     [facility]               Received:            08/24/2021 04:27 PM           Pathologist:           Mattie Sim MD                                                             Specimens:   A) - SURG PATH, left neck level II LN for lymphoma work up                                          B) - [address] 42, left neck level II LN                                                       Clinical History      Preop Diagnosis:  Please perform core biopsy of left level II lymph node - will need to send tissue for flow for workup for possible lymphoma. Associated Diagnosis:  Follow-up, Z09. Pathologic Diagnosis      A.  Lymph node, left neck level II, biopsy:  Plasma cell neoplasm, see comment. Electronically signed by Mode Wang MD on 8/27/2021 at 11:54 AM    Diagnosis Comments      The biopsy shows effaced lymph node with infiltrations of sheets of mature plasma cells admixed with small lymphocytes. Touch print shows similar morphology. The plasma cells are positive for [id number],  and is Kappa+ light chain restricted (Mary Wright). They are negative for CD5, Cyclin D1, Cd20, Cd117, and Lambda-WOJCIECH. The background small lymphocytes are positive for T cells (30%), and a fewer B-cells (10%). The Ki67 show approx. 10-20% positivity and most are those reactive T-cells. The findings are consistent with Plasmacytoma. A differential diagnosis of lymphoplasmacytic lymphoma or marginal zone lymphoma is considered but given the background of few B-cells these findings are unlikely. Correlation with IgH and molecular study is recommended. Correlation with systemic examination and serum test is recommended to rule out multiple myeloma. All controls show appropriate reactivity. All immunohistochemistry, in situ hybridization, and histochemical tests were developed by and are performed at the [address] [place] [address], Luiza Gregory [address] [place] [address], [address] [place] [address]. All tests reported here, except those addressing HER2 overexpression as a predictive marker, have not been cleared by or approved by the . [address] and Drug Administration (FDA). The laboratory is regulated under CLIA as qualified to perform high-complexity testing. The tests are used for clinical purposes. They should not be regarded as investigational or for research. Addendum      IHC for CD3 highlights T-cells similar to those of Cd5. MUM1 are positive on the plasma cells similar to those of Cd138. The Lambda-WOJCIECH show few plasma cells and K:L ratio>20:1. The findings confirm the final diagnosis.       All controls show appropriate reactivity. All immunohistochemistry, in situ hybridization, and histochemical tests were developed by and are performed at the [address] [place] [address] Laboratory, Luiza Gregory [address] [place] [address], [address] [place] [address]. All tests reported here, except those addressing HER2 overexpression as a predictive marker, have not been cleared by or approved by the . [address] and Drug Administration (FDA). The laboratory is regulated under CLIA as qualified to perform high-complexity testing. The tests are used for clinical purposes. They should not be regarded as investigational or for research. Addendum electronically signed by Silvana Canales MD on 8/31/2021 at  9:28 AM    Addendum 2      IgH PCR shows predominant monoclonal patterns, consistent with the presence of a clonalm B-cell or plasma cell population. The final diagnosis with differential diagnosis remains unchanged. Addendum electronically signed by Silvana Canales MD on 9/2/2021 at  8:12 AM    Microscopic Description      A microscopic examination was performed. Synoptic report  Specimen Site: Lymph node, left neck level II  Procedure: Biopsies  Diagnosis:  Plasma cell neoplasm, see comment. Microscopic description: The H&E section reveal a completely effaced lymphoid tissue with diffuse infiltrate with predominantly plasma cells admixed with small lymphoid cells. There are no large atypical lymphocytes present. --Ancillary Studies--  Flow cytometry: Not performed  Molecular studies: Submitted for IgH PCR and result will be followed in the addendum. Special stains: Not performed  Immunohistochemistry (IHC)/in situ hybridization (WOJCIECH):     Neoplastic cells are positive for: CD19, , and Kappa WOJCIECH and plasma cell antigen (Vs38C). Neoplastic cells are negative for: CD5, CD10, CD20, PAX5, BCL1, Lambda WOJCIECH, and Ki67 (10% by manual quantification method)  Other IHC findings: see comment.  IHC for Kappa and Lambda is unremarkable on both B and plasma cells.  CD10 is virtually negative on the biopsy. CD20 and PAX5 show a few B-cell clusters (<10%) and they are negative for Cd5, Cd10, and BCL1. HEME Mutation Panel  Inactivating and splice site TET2 mutations detected. No MYD88, CARD11, CXCR4, KLF2, NOTCH1, NOTCH2 or any other mutations associated wtih specific lymphoma types are noted. en cleared by or approved by the [employer] and Drug Administration (FDA). The laboratory is regulated under CLIA as qualified to perform high-complexity testing. The tests are used for clinical purposes. They should not be regarded as investigational or for research. Images          For Immediate Release to Patient's Monitorhart? Yes  NoAbnormal         [address] [facility]   Lab and Collection        PATHOLOGY:   Clinical History 7/22/21      Mass left parotid. *CYTOLOGIC DIAGNOSIS*      A. LEFT PAROTID MASS, FNA (CYTOLOGY AND CELL BLOCK): FINAL DIAGNOSIS:  Non Diagnostic  Normal Salivary Gland Elements Only        FNA Performed By:  Clinician       Electronically signed by Papi Leigh MD on 7/23/2021 at  4:57 PM      Clinical Information: EXTRAMEDULLARY PLASMACYTOMA NOT HAVING ACHIEVED   REMISSION, C90.20      9/29/21    FINAL DIAGNOSIS:   Bone marrow core, clot, and aspirate smear, site unspecified:    Normocellular bone marrow (30-40%) with orderly trilineage   hematopoiesis      and 2% polyclonal mature plasma cells. No significant morphologic or flow cytometric evidence of a monoclonal       plasma cell neoplasm. Adequate iron stores. Peripheral blood: mild anemia. Bronch/EBUS Dr Feliz Setting 10/22/21    GENETICS:  See above    MOLECULAR:  See above    ASSESSMENT/PLAN:    1: Diagnosis: 68-year-old gentleman with a left neck mass on core biopsy at [place] favors a plasmacytoma. Patient needs thorough evaluation to rule out myeloma. He is asymptomatic.   Bone marrow biopsy is uninvolved histologically and by flow cytometry. PET scan does not show findings suggestive of myeloma in particular no bone lesions. The findings of mediastinal node uptake is unexpected and needs [facility] EBUS to work this up further. Indeterminate 9 mm left apical lesion as well. [facility] EBUS report reviewed and showed normal airways nodes unremarkable other than the station 7 subcarinal node. Both the bronchoalveolar lavage and the needle aspiration of the subcarinal node show no malignancy. Therefore, status radiation completed January 2022 for treatment of what appears to be an isolated left neck plasmacytoma. 2) Prognosis / Disease Status: Excellent of a solitary plasmacytoma. Studies ongoing follow-up regarding his light chain ratio and also of the adenopathy in the mediastinum although biopsied as benign. He also has a linear density in the left lung apex that is being followed closely. 3) Work-up:    Labs: 8/18/2022 labs drawn and pending   Imaging: June 2022 neck CT unremarkable. Due for follow-up chest CT with contrast early October to be compared to the prior study of May. Procedures: Bone marrow aspiration biopsy for ruling out myeloma. Results reviewed and are negative. Consults: Dr. Gem Chambers pulmonary for bronchoscopy EBUS specifically to biopsy the subcarinal node. This was done 10/22. See above. Airways normal lymph nodes normal other than station 7. Station 7 biopsy/cytology benign and BAL benign. If negative choices include follow-up chest CT imaging in 2 months versus mediastinoscopy. Preference simply to repeat the chest CT in 2 months. Other: Light chain MGUS. 4) Symptom Management: None needed      5) Supportive care provided. Level of care is appropriate. Teaching done today. Reviewed that it is important that the chest CT be done in a couple months to follow-up on the uncertain linear opacity in the left lung apex.     6) Treatment goal: Cure      Treatment plan:     Completed radiation the left neck solitary plasmacytoma by Dr. Fe Barroso. 7) Medications reviewed. Prescriptions today: None            No orders of the defined types were placed in this encounter. OARRS:  Controlled Substance Monitoring:    Acute and Chronic Pain Monitoring:   No flowsheet data found. 8) Research Options:   None at this time      9) Other:        None    10) Follow Up: Follow-up with me mid October. Pressure will be 4 oh he will have a chest CT with contrast on myeloma labs.       Vishal Blackman MD

## 2022-08-18 ENCOUNTER — OFFICE VISIT (OUTPATIENT)
Dept: ONCOLOGY | Age: 82
End: 2022-08-18
Payer: MEDICARE

## 2022-08-18 ENCOUNTER — HOSPITAL ENCOUNTER (OUTPATIENT)
Dept: INFUSION THERAPY | Age: 82
Discharge: HOME | End: 2022-08-18
Payer: MEDICARE

## 2022-08-18 VITALS
RESPIRATION RATE: 16 BRPM | DIASTOLIC BLOOD PRESSURE: 64 MMHG | HEART RATE: 70 BPM | SYSTOLIC BLOOD PRESSURE: 126 MMHG | TEMPERATURE: 97.9 F

## 2022-08-18 VITALS
HEIGHT: 70 IN | SYSTOLIC BLOOD PRESSURE: 126 MMHG | RESPIRATION RATE: 16 BRPM | DIASTOLIC BLOOD PRESSURE: 64 MMHG | BODY MASS INDEX: 27.92 KG/M2 | OXYGEN SATURATION: 97 % | HEART RATE: 70 BPM | WEIGHT: 195 LBS | TEMPERATURE: 97.9 F

## 2022-08-18 DIAGNOSIS — C90.22 EXTRAMEDULLARY PLASMACYTOMA IN RELAPSE (HCC): ICD-10-CM

## 2022-08-18 DIAGNOSIS — R91.8 LUNG MASS: Primary | ICD-10-CM

## 2022-08-18 DIAGNOSIS — D47.2 MGUS (MONOCLONAL GAMMOPATHY OF UNKNOWN SIGNIFICANCE): ICD-10-CM

## 2022-08-18 DIAGNOSIS — C90.31 SOLITARY PLASMACYTOMA IN REMISSION (HCC): ICD-10-CM

## 2022-08-18 DIAGNOSIS — R59.9 ADENOPATHY: ICD-10-CM

## 2022-08-18 LAB
ABSOLUTE IMMATURE GRANULOCYTE: 0.02 THOU/MM3 (ref 0–0.07)
ALBUMIN SERPL-MCNC: 4.4 G/DL (ref 3.5–5.1)
ALP BLD-CCNC: 88 U/L (ref 38–126)
ALT SERPL-CCNC: 12 U/L (ref 11–66)
AST SERPL-CCNC: 20 U/L (ref 5–40)
BASINOPHIL, AUTOMATED: 0 % (ref 0–3)
BASOPHILS ABSOLUTE: 0 THOU/MM3 (ref 0–0.1)
BILIRUB SERPL-MCNC: 0.4 MG/DL (ref 0.3–1.2)
BILIRUBIN DIRECT: < 0.2 MG/DL (ref 0–0.3)
BUN, WHOLE BLOOD: 17 MG/DL (ref 8–26)
CHLORIDE, WHOLE BLOOD: 107 MEQ/L (ref 98–109)
CREATININE, WHOLE BLOOD: 1.3 MG/DL (ref 0.5–1.2)
EOSINOPHILS ABSOLUTE: 0.1 THOU/MM3 (ref 0–0.4)
EOSINOPHILS RELATIVE PERCENT: 3 % (ref 0–4)
GFR, ESTIMATED: 56 ML/MIN/1.73M2
GLUCOSE, WHOLE BLOOD: 90 MG/DL (ref 70–108)
HCT VFR BLD CALC: 35.6 % (ref 42–52)
HEMOGLOBIN: 11.3 GM/DL (ref 14–18)
IMMATURE GRANULOCYTES: 0 %
IONIZED CALCIUM, WHOLE BLOOD: 1.25 MMOL/L (ref 1.12–1.32)
LYMPHOCYTES # BLD: 20 % (ref 15–47)
LYMPHOCYTES ABSOLUTE: 0.9 THOU/MM3 (ref 1–4.8)
MCH RBC QN AUTO: 29.7 PG (ref 26–33)
MCHC RBC AUTO-ENTMCNC: 31.7 GM/DL (ref 32.2–35.5)
MCV RBC AUTO: 94 FL (ref 80–94)
MONOCYTES ABSOLUTE: 0.4 THOU/MM3 (ref 0.4–1.3)
MONOCYTES: 9 % (ref 0–12)
PDW BLD-RTO: 14.4 % (ref 11.5–14.5)
PLATELET # BLD: 220 THOU/MM3 (ref 130–400)
PMV BLD AUTO: 9.8 FL (ref 9.4–12.4)
POTASSIUM, WHOLE BLOOD: 4.7 MEQ/L (ref 3.5–4.9)
RBC # BLD: 3.8 MILL/MM3 (ref 4.7–6.1)
SEG NEUTROPHILS: 67 % (ref 43–75)
SEGMENTED NEUTROPHILS ABSOLUTE COUNT: 3 THOU/MM3 (ref 1.8–7.7)
SODIUM, WHOLE BLOOD: 140 MEQ/L (ref 138–146)
TOTAL CO2, WHOLE BLOOD: 26 MEQ/L (ref 23–33)
TOTAL PROTEIN: 7.9 G/DL (ref 6.1–8)
WBC # BLD: 4.5 THOU/MM3 (ref 4.8–10.8)

## 2022-08-18 PROCEDURE — 82784 ASSAY IGA/IGD/IGG/IGM EACH: CPT

## 2022-08-18 PROCEDURE — 99213 OFFICE O/P EST LOW 20 MIN: CPT | Performed by: INTERNAL MEDICINE

## 2022-08-18 PROCEDURE — 84155 ASSAY OF PROTEIN SERUM: CPT

## 2022-08-18 PROCEDURE — 86335 IMMUNFIX E-PHORSIS/URINE/CSF: CPT

## 2022-08-18 PROCEDURE — 1123F ACP DISCUSS/DSCN MKR DOCD: CPT | Performed by: INTERNAL MEDICINE

## 2022-08-18 PROCEDURE — 1036F TOBACCO NON-USER: CPT | Performed by: INTERNAL MEDICINE

## 2022-08-18 PROCEDURE — 80047 BASIC METABLC PNL IONIZED CA: CPT

## 2022-08-18 PROCEDURE — 83883 ASSAY NEPHELOMETRY NOT SPEC: CPT

## 2022-08-18 PROCEDURE — 85025 COMPLETE CBC W/AUTO DIFF WBC: CPT

## 2022-08-18 PROCEDURE — G8427 DOCREV CUR MEDS BY ELIG CLIN: HCPCS | Performed by: INTERNAL MEDICINE

## 2022-08-18 PROCEDURE — 84156 ASSAY OF PROTEIN URINE: CPT

## 2022-08-18 PROCEDURE — G8417 CALC BMI ABV UP PARAM F/U: HCPCS | Performed by: INTERNAL MEDICINE

## 2022-08-18 PROCEDURE — 99211 OFF/OP EST MAY X REQ PHY/QHP: CPT

## 2022-08-18 PROCEDURE — 80076 HEPATIC FUNCTION PANEL: CPT

## 2022-08-18 PROCEDURE — 36415 COLL VENOUS BLD VENIPUNCTURE: CPT

## 2022-08-18 PROCEDURE — 84165 PROTEIN E-PHORESIS SERUM: CPT

## 2022-08-18 NOTE — PATIENT INSTRUCTIONS
October 4 patient will have labs and CT of the chest with contrast  Follow-up with me 1 week later approximately October 12.

## 2022-08-19 LAB
IGA: 183 MG/DL (ref 70–400)
IGG: 1288 MG/DL (ref 700–1600)
IGM: 61 MG/DL (ref 40–230)

## 2022-08-20 LAB — KAPPA/LAMBDA FREE LIGHT CHAINS: NORMAL

## 2022-08-21 LAB — IMMUNOFIXATION URINE: NORMAL

## 2022-08-22 LAB — PROTEIN ELECTROPHORESIS, SERUM: NORMAL

## 2022-09-19 ENCOUNTER — HOSPITAL ENCOUNTER (OUTPATIENT)
Dept: RADIATION ONCOLOGY | Age: 82
Discharge: HOME | End: 2022-09-19
Attending: RADIOLOGY
Payer: MEDICARE

## 2022-09-19 VITALS
HEART RATE: 78 BPM | BODY MASS INDEX: 29.01 KG/M2 | TEMPERATURE: 97.9 F | OXYGEN SATURATION: 96 % | WEIGHT: 202.2 LBS | SYSTOLIC BLOOD PRESSURE: 160 MMHG | RESPIRATION RATE: 16 BRPM | DIASTOLIC BLOOD PRESSURE: 70 MMHG

## 2022-09-19 PROCEDURE — 99213 OFFICE O/P EST LOW 20 MIN: CPT | Performed by: RADIOLOGY

## 2022-09-19 PROCEDURE — 99212 OFFICE O/P EST SF 10 MIN: CPT | Performed by: RADIOLOGY

## 2022-09-19 ASSESSMENT — PAIN DESCRIPTION - LOCATION: LOCATION: HAND

## 2022-09-19 ASSESSMENT — ENCOUNTER SYMPTOMS
ABDOMINAL PAIN: 0
SINUS PRESSURE: 0
BLOOD IN STOOL: 0
NAUSEA: 0
BACK PAIN: 0
SINUS PAIN: 0
SHORTNESS OF BREATH: 0
COUGH: 0
RECTAL PAIN: 0
SORE THROAT: 0
VOICE CHANGE: 0
TROUBLE SWALLOWING: 0
FACIAL SWELLING: 0

## 2022-09-19 ASSESSMENT — PAIN DESCRIPTION - DESCRIPTORS: DESCRIPTORS: ACHING

## 2022-09-19 ASSESSMENT — PAIN DESCRIPTION - ORIENTATION: ORIENTATION: RIGHT;LEFT

## 2022-09-19 NOTE — PROGRESS NOTES
[address] [facility] [address], [address]        [place],  [facility]        Milana Gordonoy: [phone]        F: [phone]       [email or web address]            FOLLOW UP NOTE    Date of Service: 2022  Patient ID: Mansoor Tolentino   : 1940  MRN: [id number]   Acct Number: [de-identified]       DATE OF SERVICE: 2022   LOCATION: [facility]  PROVIDER: Erica Panchal MD MS    FOLLOW UP PHYSICIANS: Dr. Jose Ramon Phillips ([facility]) Dr. 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 He appears to be doing well. He denies any new lumps, dysphagia, epistaxis, rhinorrhea, nasal obstruction, sore throat, voice change, regurgitation, nausea, vomiting, bleeding, shortness of breath, cough, hemoptysis, unexpected weight change. No concerning findings on exam today. The patient's most recent imaging showed no neck mass or cervical adenopathy, but did reveal ground glass opacity of the left upper lobe and mediastinal, subcarinal, and left hilar nodes. Medical oncology has ordered CT chest for follow up for this; we will defer to medical oncology for continued ordering of labs and imaging for surveillance of the plasmacytoma. He is scheduled to see medical oncology in October. The patient will return to clinic in 6 months or sooner if clinically indicated. They have our clinic number to call with any questions or concerns if needed. Thank you for allowing us to be a part of their care. HPI:  Oncology History Overview Note   As per MedOnc on 21:  Mansoor Tolentino is a 80 y.o. male with L neck mass with recent [facility] bx reveaaling a plasma cell neoplasm/ possible plasmacytoma. Patient cancelled heme appt due on 9/8 as he wanted all care closer to home. Otolaryngology care per Dr Reba Barnes at [facility]. Patient had Covid on 8/26, outpt mngmt and recovered. April noted lump L neck under the jaw. Since core bx has been done, he states the mass is much smaller. Since April ,legs a bit weaker bilaterally but no pain except in knees. Overall describes a sense of wellbeing is stable and unchanged. Denies any drenching sweats fevers or weight loss. Denies any midline back pain or pain in his upper arms or legs. Denies any frequent or severe infections. He had Covid vaccination done in April.       LABS:    See 354 labs above including IgA 259, IgG 1961, IgM 93, beta-2 microglobulin 3.1  SPEP and serum immune fixation-no monoclonal protein  Serum free kappa light chains 84.8, lambda 37.0, ratio 2.29 (0.26-1.65)     IGM  Component Ref Range & Units 8/18/22 1059 9/23/21 1419   IgM 40 - 230 mg/dL 61  93    IGG  Component Ref Range & Units 8/18/22 1059 9/23/21 1419   IgG 700 - 1600 mg/dL 1288  1961      IGA   Component Ref Range & Units 8/18/22 1100 9/23/21 1420   IgA 70 - 400 mg/dL 183  259    CBC WITH DIFF  Component Ref Range & Units 8/18/22 1101 4/26/22 1216 12/16/21 1231 9/29/21 0930 9/23/21 1419   WBC 4.8 - 10.8 thou/mm3 4.5 Low   5.7  6.3  7.4  5.4    RBC 4.70 - 6.10 mill/mm3 3.80 Low   4.00 Low   4.43 Low   4.36 Low   4.10 Low     Hemoglobin 14.0 - 18.0 gm/dl 11.3 Low   12.0 Low   13.6 Low   12.9 Low   12.3 Low     Hematocrit 42.0 - 52.0 % 35.6 Low   36.7 Low   42.2  41.3 Low   37.9 Low     MCV 80 - 94 fL 94  92  95.3 High  R  94.7 High  R  92    MCH 26.0 - 33.0 pg 29.7  30.0  30.7  29.6  30.0    MCHC 32.2 - 35.5 gm/dl 31.7 Low   32.7  32.2  31.2 Low   32.5    RDW 11.5 - 14.5 % 14.4  13.5    14.1    Platelets 489 - 523 thou/mm3 220  219  234  217  240    MPV 9.4 - 12.4 fL 9.8  9.8  11.1  10.7 CM  10.1 Seg Neutrophils 43 - 75 % 67  65  63.7 R   64    Lymphocytes 15 - 47 % 20  22  22.5 R   21    Monocytes 0 - 12 % 9  10  10.3 R   11    Eosinophils % 0 - 4 % 3  2    4    BASINOPHIL, AUTOMATED 0 - 3 % 0  0    0    Immature Granulocytes % 0  0  0.5   1    Segs Absolute 1.8 - 7.7 thou/mm3 3.0  3.8  4.0   3.5    Lymphocytes Absolute 1.0 - 4.8 thou/mm3 0.9 Low   1.3  1.4   1.1    Monocytes Absolute 0.4 - 1.3 thou/mm3 0.4  0.6  0.6   0.6    Eosinophils Absolute 0.0 - 0.4 thou/mm3 0.1  0.1  0.2   0.2    Basophils Absolute 0.0 - 0.1 thou/mm3 0.0  0.0  0.0   0.0    Absolute Immature Granulocyte 0.00 - 0.07 thou/mm3 0.02  0.01 CM    0.03 CM    Comment: Performed at [address] [place] [address]  [id number]   RDW-CV    13.2 R  14.6 High  R     RDW-SD    46.2 High  R  50.6 High  R     Eosinophils    2.7 R      Basophils    0.3 R      Immature Grans (Abs)    0.03 R      nRBC    0 R, CM        BMP  Component Ref Range & Units 8/18/22 1101 4/26/22 1216 9/23/21 1419   Sodium, Whole Blood 138 - 146 meq/l 140  142  141 R    Potassium, Whole Blood 3.5 - 4.9 meq/l 4.7  4.4     Chloride, Whole Blood 98 - 109 meq/l 107  109     TOTAL CO2, WHOLE BLOOD 23 - 33 meq/l 26  23     Glucose, Whole Blood 70 - 108 mg/dl 90  83     BUN, WHOLE BLOOD 8 - 26 mg/dl 17  22     CREATININE, WHOLE BLOOD 0.5 - 1.2 mg/dl 1.3 High   1.6 High      Ionized Calcium, WB 1.12 - 1.32 mmol/L 1.25  1.15 CM       8/18/22 KAPPA/LAMBDA QUANT FLC, SERUM  Light Chains SEE BELOW    Comment: Kappa Qnt Free Light Chains                  40.71 H   3.30-19.40    mg/L   INTERPRETIVE INFORMATION: Kappa Qnt Free Light Chains   Undetected antigen excess is a rare event but cannot be excluded. Free light chain results should always be interpreted in   conjunction with other clinical and laboratory findings.    Lambda Qnt Free Light Chains                 23.36     5.71-26.30    mg/L   INTERPRETIVE INFORMATION: Lambda Qnt Free Light Chains   Undetected antigen excess is a rare event but cannot be excluded. Free light chain results should always be interpreted in   conjunction with other clinical and laboratory findings. [place]/Lambda Free Light Chain Ratio           1.74 H   0.26-1.65    22 SERUM PROTEIN ELECTROPHORESIS  Protein Electrophoresis, Serum SEE BELOW    Comment: Total Protein, Serum                           7.5     6.3-8.2       g/dL   Albumin                                       3. 88     3.75-5.01     g/dL   Alpha 1 Globulin                              0.46     0.19-0.46     g/dL   Alpha 2 Globulin                              0.98     0.48-1.05     g/dL   Beta Globulin                                 0. 89     0.48-1.10     g/dL   Gamma                                         1.30     0.62-1.51     g/dL   Monoclonal Protein                             N/A                   g/dL   SPEP/MYRNA Interpretation                   See Note   Normal SPEP pattern. Immunofixation electrophoresis (MYRNA)   is a more sensitive technique for the identification of   small M-proteins. EER Protein Electrophoresis, Serum        See Note      22 URINE IMMUNOFIXATION  Immunofixation Urine See Note    Comment: Urine is negative for monoclonal Free Light Chains (Bence   Werner Protein). IMAGING:    PROCEDURE: PET CT SKULL BASE TO MID THIGH    10/7/21       CLINICAL INFORMATION: 71-year-old man with extra medullary plasmacytoma in the left neck; initial treatment strategy. Radiopharmaceutical: 15.6 mCi F-18 FDG, intravenously. TECHNIQUE: PET/CT imaging was performed from the skull base to the midthigh levels using routine PET acquisition. Injection site: Left antecubital fossa.        Time of FDG injection: 8:51 AM.       Serum glucose: 110 mg/dL at 8:49 AM.       Time of imagin:58 AM.       COMPARISON: None       FINDINGS:        Neck: Confusion confluent left-sided level 2 cervical lymph nodes measure 1.2 cm in short axis diameter and are associated with a maximum SUV of 2.3 (image 50). Chest: Cardiac size is normal. Atherosclerotic calcifications are present in the thoracic aorta and coronary arteries. 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 There is no pleural or    pericardial effusion. Calcified granulomas are present in the bilateral lungs. There is scarring at the bilateral lung apices. An indistinct 0.9 cm density in the left lung apex has a maximum SUV of 1.8 (image 94). Minimal alveolar opacities at the    bilateral lung bases are likely infectious or inflammatory. There are multiple FDG avid mediastinal lymph nodes. A representative subcarinal lymph node measures 1.9 cm in short axis diameter and has a maximum SUV of 4.9 (image 115). A nonenlarged left    hilar lymph node has a maximum SUV of 3.2 (image 108). There is no FDG avid axillary lymphadenopathy. Activity associated with a small hiatal hernia may be infectious or inflammatory. Degenerative changes are present in the thoracic spine without    evidence of hypermetabolic osseous metastatic disease. Periarticular activity at the bilateral shoulders is likely degenerative. Abdomen/pelvis: Physiologic activity is present in the liver, spleen, urinary collecting system and gastrointestinal tract. There is a calcification in the lumen of the gallbladder. There is mild fatty replacement of the pancreas. Atherosclerotic    calcific lesions are present in the abdominal aorta without evidence of aneurysm. There are diverticula in sigmoid colon without evidence of diverticulitis. The prostate gland is enlarged and contains calcifications. There is no FDG avid mesenteric,    retroperitoneal or pelvic lymphadenopathy. A left inguinal lymph node which measures 1.0 cm in short axis diameter has a maximum SUV of 4.6 (image 268).  Degenerative changes are seen in the lumbar spine and pelvis without evidence of hypermetabolic    osseous metastatic disease. Impression   1. Confluent mildly FDG avid left cervical lymphadenopathy which likely corresponds to known malignancy. 2. FDG avid mediastinal, left hilar and left inguinal lymphadenopathy suspicious for metastatic disease. 3. Small, mildly FDG avid density at the left lung apex of indeterminant etiology. Malignancy cannot be excluded. Final report electronically signed by Dr. Olivia Real on 10/7/2021 11:13 AM     5/12/22 CT CHEST WO CON  Impression   Irregular linear nodular density left apex with adjacent subsolid nodule/focal ground glass opacity. This opacity has minimally increased since the prior exam   Multiple nonenlarged mediastinal, subcarinal and left hilar nodes. 6/20/22 CT SOFT TISSUE NECK W CON  Impression       1. No evidence of cervical adenopathy or mass lesion. 2. Persistent left upper lobe groundglass opacity. Extramedullary plasmacytoma ([facility] [address]   8/24/2021 Surgery    Lymph node bx [facility]  Case Report   Surgical Pathology Report                         Case: [id number]                                   Authorizing Provider:  Lupis Ceron MD              Collected:           08/24/2021 03:19 PM           Ordering Location:     [facility]               Received:            08/24/2021 04:27 PM           Pathologist:           Cayden Prado MD                                                             Specimens:   A) - SURG PATH, left neck level II LN for lymphoma work up                                          B) - Parul 42, left neck level II LN                                                         Clinical History       Preop Diagnosis:  Please perform core biopsy of left level II lymph node - will need to send tissue for flow for workup for possible lymphoma. Associated Diagnosis:  Follow-up, Z09. Pathologic Diagnosis       A. Lymph node, left neck level II, biopsy:  Plasma cell neoplasm, see comment. Electronically signed by Sammy Plasencia MD on 8/27/2021 at 11:54 AM     Diagnosis Comments       The biopsy shows effaced lymph node with infiltrations of sheets of mature plasma cells admixed with small lymphocytes. Touch print shows similar morphology. The plasma cells are positive for LhDT64g,  and is Kappa+ light chain restricted (Birdie Ny). They are negative for CD5, Cyclin D1, Cd20, Cd117, and Lambda-WOJCIECH. The background small lymphocytes are positive for T cells (30%), and a fewer B-cells (10%). The Ki67 show approx. 10-20% positivity and most are those reactive T-cells. The findings are consistent with Plasmacytoma. A differential diagnosis of lymphoplasmacytic lymphoma or marginal zone lymphoma is considered but given the background of few B-cells these findings are unlikely. Correlation with IgH and molecular study is recommended. Correlation with systemic examination and serum test is recommended to rule out multiple myeloma. All controls show appropriate reactivity. All immunohistochemistry, in situ hybridization, and histochemical tests were developed by and are performed at the [address] [place] [address], Luiza Gregory [address] [place] [address], [address] [place] [address]. All tests reported here, except those addressing HER2 overexpression as a predictive marker, have not been cleared by or approved by the [employer] and Drug Administration (FDA). The laboratory is regulated under CLIA as qualified to perform high-complexity testing. The tests are used for clinical purposes. They should not be regarded as investigational or for research. Addendum       IHC for CD3 highlights T-cells similar to those of Cd5. MUM1 are positive on the plasma cells similar to those of Cd138. The Lambda-WOJCIECH show few plasma cells and K:L ratio>20:1. The findings confirm the final diagnosis. All controls show appropriate reactivity.      All immunohistochemistry, in situ hybridization, and histochemical tests were developed by and are performed at the [address] [place] [address] Laboratory, Luiza Gregory [address] [place] [address], [address] [place] [address]. All tests reported here, except those addressing HER2 overexpression as a predictive marker, have not been cleared by or approved by the [employer] and Drug Administration (FDA). The laboratory is regulated under CLIA as qualified to perform high-complexity testing. The tests are used for clinical purposes. They should not be regarded as investigational or for research. Addendum electronically signed by Angelica Barraza MD on 8/31/2021 at  9:28 AM     Addendum 2       IgH PCR shows predominant monoclonal patterns, consistent with the presence of a clonalm B-cell or plasma cell population. The final diagnosis with differential diagnosis remains unchanged. Addendum electronically signed by Angelica Barraza MD on 9/2/2021 at  8:12 AM     Microscopic Description       A microscopic examination was performed. Synoptic report  Specimen Site: Lymph node, left neck level II  Procedure: Biopsies  Diagnosis:  Plasma cell neoplasm, see comment. Microscopic description: The H&E section reveal a completely effaced lymphoid tissue with diffuse infiltrate with predominantly plasma cells admixed with small lymphoid cells. There are no large atypical lymphocytes present. --Ancillary Studies--  Flow cytometry: Not performed  Molecular studies: Submitted for IgH PCR and result will be followed in the addendum. Special stains: Not performed  Immunohistochemistry (IHC)/in situ hybridization (WOJCIECH):     Neoplastic cells are positive for: CD19, , and Kappa WOJCIECH and plasma cell antigen (Vs38C). Neoplastic cells are negative for: CD5, CD10, CD20, PAX5, BCL1, Lambda WOJCIECH, and Ki67 (10% by manual quantification method)  Other IHC findings: see comment. IHC for Kappa and Lambda is unremarkable on both B and plasma cells. CD10 is virtually negative on the biopsy.  CD20 and PAX5 show a few B-cell clusters (<10%) and they are negative for Cd5, Cd10, and BCL1. HEME Mutation Panel  Inactivating and splice site TET2 mutations detected. No MYD88, CARD11, CXCR4, KLF2, NOTCH1, NOTCH2 or any other mutations associated wtih specific lymphoma types are noted. en cleared by or approved by the [employer] and Drug Administration (FDA). The laboratory is regulated under CLIA as qualified to perform high-complexity testing. The tests are used for clinical purposes. They should not be regarded as investigational or for research. 10/22/2021 Surgery    Bronch/EBUS   This included BAL of the right middle lobe. Normal airways. Mediastinal lymphadenopathy. Complete ultrasound lymph node exam performed including stations 2, 4, 7, 10 and 11. Station 7 lymph nodes were heterogeneous in appearance, irregular and enlarged with calcifications. 12 passes on station 7. Airways are normal.  Abnormal appearance of station 7 lymph nodes, normal lymphocytes seen no malignancy identified. FINAL RESULTS:    A.  Bronchoalveolar lavage:                  No malignant cells seen. Alveolar macrophages. B.  Lymph node, station 7, fine-needle aspirate:                  No malignant cells seen. Benign appearing lymphoid sample. 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 Lino Wilcox     12/14/2021 - 1/12/2022 Radiation    Treatment Course Number: 1    Treatment Site (s) Modality Dose (cGy) Fractions Elapsed Days   Left Neck Plasmacytoma IMRT 4000 20 29   Cumulative Dose: 4000 cGy    Radiation therapy delivered under the care of Dr. Hunter Varghese MD MS ([facility])          INTERVAL HISTORY:  Paul Varela is a [de-identified] y.o. male is presenting today for regularly scheduled follow up regarding the above mentioned oncologic history.     Review of Systems   Constitutional: Positive for fatigue. Negative for activity change, appetite change and unexpected weight change. HENT:  Negative for congestion, ear pain, facial swelling, hearing loss, nosebleeds, sinus pressure, sinus pain, sore throat, tinnitus, trouble swallowing and voice change. Eyes:  Negative for visual disturbance. Respiratory:  Negative for cough and shortness of breath. Cardiovascular:  Negative for chest pain. Gastrointestinal:  Negative for abdominal pain, blood in stool, nausea and rectal pain. Genitourinary:  Negative for dysuria, frequency and urgency. Musculoskeletal:  Positive for joint swelling (hands). Negative for arthralgias, back pain, neck pain and neck stiffness. Neurological:  Negative for dizziness, facial asymmetry, speech difficulty, weakness, light-headedness, numbness and headaches. Hematological:  Negative for adenopathy. Psychiatric/Behavioral:  Negative for confusion. A complete review of systems was performed and found to be negative except as presented above.     CHAPERONE: n/a    PAIN: 8/10    LABORATORY STUDIES:  Onc labs:   Lab Results   Component Value Date/Time     09/23/2021 02:19 PM       MEDICATIONS:   Current Outpatient Medications   Medication Sig Dispense Refill    metoprolol succinate (TOPROL XL) 25 MG extended release tablet TAKE 1 TABLET BY MOUTH ONCE DAILY      metoprolol succinate (TOPROL XL) 25 MG extended release tablet Take 25 mg by mouth daily      PEDIATRIC MULTIVIT-MINERALS-C PO Take by mouth      LORATADINE ALLERGY RELIEF PO Take by mouth      lisinopril (PRINIVIL;ZESTRIL) 10 MG tablet TAKE 1 TABLET BY MOUTH ONCE DAILY      acetaminophen (TYLENOL) 325 MG tablet Take 650 mg by mouth every 6 hours as needed for Pain      OMEPRAZOLE PO Take by mouth      cetirizine (ZYRTEC) 10 MG tablet Take 10 mg by mouth daily      Multiple Vitamins-Minerals (THERAPEUTIC MULTIVITAMIN-MINERALS) tablet Take 1 tablet by mouth daily      pravastatin (PRAVACHOL) 80 MG tablet Take 80 mg by mouth daily. metoprolol (LOPRESSOR) 25 MG tablet Take 25 mg by mouth daily       aspirin 81 MG tablet Take 81 mg by mouth daily. No current facility-administered medications for this encounter. PHYSICAL EXAMINATION:  VITAL SIGNS: BP (!) 160/70   Pulse 78   Temp 97.9 °F (36.6 °C) (Infrared)   Resp 16   Wt 202 lb 3.2 oz (91.7 kg)   SpO2 96%   BMI 29.01 kg/m²     ECO - Asymptomatic (Fully active, able to carry on all pre-disease activities without restriction)    Physical Exam  Constitutional:       General: He is not in acute distress. Appearance: Normal appearance. HENT:      Head: Normocephalic and atraumatic. Comments: Oral mucosa moist without lesion or exudate     Ears:      Comments: Hearing aid in place in right ear  Pulmonary:      Effort: Pulmonary effort is normal. No respiratory distress. Chest:   Breasts:     Right: No supraclavicular adenopathy. Left: No supraclavicular adenopathy. Abdominal:      General: Abdomen is flat. Musculoskeletal:      Comments: Swelling of the bilateral hands   Lymphadenopathy:      Head:      Right side of head: No submental, submandibular, preauricular or posterior auricular adenopathy. Left side of head: No submental, submandibular, preauricular or posterior auricular adenopathy. Cervical: No cervical adenopathy. Upper Body:      Right upper body: No supraclavicular adenopathy. Left upper body: No supraclavicular adenopathy. Neurological:      Mental Status: He is alert and oriented to person, place, and time. Electronically signed by Jody Arambula MD MS on 22 at 9:36 AM EDT     ATTESTATION: 20 minutes were spent with the patient at today's visit reviewing pertinent information related to their oncologic diagnosis, including any recent labs, imaging, follow ups and plan of care going forward.     CC:Dr. Rosie Mcleod (MedOnc) Dr. Te Dominguez (PCP)  ACC:[facility] Registry

## 2022-10-04 ENCOUNTER — HOSPITAL ENCOUNTER (OUTPATIENT)
Dept: CT IMAGING | Age: 82
Discharge: HOME | End: 2022-10-04
Payer: MEDICARE

## 2022-10-04 DIAGNOSIS — D47.2 MGUS (MONOCLONAL GAMMOPATHY OF UNKNOWN SIGNIFICANCE): ICD-10-CM

## 2022-10-04 DIAGNOSIS — R91.8 LUNG MASS: ICD-10-CM

## 2022-10-04 DIAGNOSIS — C90.31 SOLITARY PLASMACYTOMA IN REMISSION (HCC): ICD-10-CM

## 2022-10-04 LAB — POC CREATININE WHOLE BLOOD: 1.5 MG/DL (ref 0.5–1.2)

## 2022-10-04 PROCEDURE — 82565 ASSAY OF CREATININE: CPT

## 2022-10-04 PROCEDURE — 6360000004 HC RX CONTRAST MEDICATION: Performed by: INTERNAL MEDICINE

## 2022-10-04 PROCEDURE — 71260 CT THORAX DX C+: CPT

## 2022-10-04 RX ADMIN — IOPAMIDOL 85 ML: 755 INJECTION, SOLUTION INTRAVENOUS at 08:33

## 2022-10-10 ENCOUNTER — OFFICE VISIT (OUTPATIENT)
Dept: CARDIOLOGY CLINIC | Age: 82
End: 2022-10-10
Payer: MEDICARE

## 2022-10-10 VITALS
RESPIRATION RATE: 18 BRPM | SYSTOLIC BLOOD PRESSURE: 154 MMHG | WEIGHT: 202.6 LBS | HEIGHT: 70 IN | DIASTOLIC BLOOD PRESSURE: 82 MMHG | BODY MASS INDEX: 29.01 KG/M2 | HEART RATE: 77 BPM

## 2022-10-10 DIAGNOSIS — I10 HYPERTENSION, UNSPECIFIED TYPE: Primary | ICD-10-CM

## 2022-10-10 DIAGNOSIS — E78.5 DYSLIPIDEMIA (HIGH LDL; LOW HDL): ICD-10-CM

## 2022-10-10 PROCEDURE — 1036F TOBACCO NON-USER: CPT | Performed by: INTERNAL MEDICINE

## 2022-10-10 PROCEDURE — 99213 OFFICE O/P EST LOW 20 MIN: CPT | Performed by: INTERNAL MEDICINE

## 2022-10-10 PROCEDURE — 1123F ACP DISCUSS/DSCN MKR DOCD: CPT | Performed by: INTERNAL MEDICINE

## 2022-10-10 PROCEDURE — 93000 ELECTROCARDIOGRAM COMPLETE: CPT | Performed by: INTERNAL MEDICINE

## 2022-10-10 PROCEDURE — G8484 FLU IMMUNIZE NO ADMIN: HCPCS | Performed by: INTERNAL MEDICINE

## 2022-10-10 PROCEDURE — G8427 DOCREV CUR MEDS BY ELIG CLIN: HCPCS | Performed by: INTERNAL MEDICINE

## 2022-10-10 PROCEDURE — G8417 CALC BMI ABV UP PARAM F/U: HCPCS | Performed by: INTERNAL MEDICINE

## 2022-10-10 RX ORDER — ESOMEPRAZOLE MAGNESIUM 20 MG/1
20 FOR SUSPENSION ORAL DAILY
COMMUNITY

## 2022-10-10 RX ORDER — PRAVASTATIN SODIUM 80 MG/1
80 TABLET ORAL DAILY
Qty: 90 TABLET | Refills: 3 | Status: SHIPPED | OUTPATIENT
Start: 2022-10-10

## 2022-10-10 RX ORDER — LISINOPRIL 20 MG/1
20 TABLET ORAL DAILY
Qty: 90 TABLET | Refills: 3 | Status: SHIPPED | OUTPATIENT
Start: 2022-10-10

## 2022-10-10 RX ORDER — LISINOPRIL 10 MG/1
TABLET ORAL
Qty: 90 TABLET | Refills: 3 | Status: SHIPPED | OUTPATIENT
Start: 2022-10-10 | End: 2022-10-10

## 2022-10-10 RX ORDER — METOPROLOL SUCCINATE 50 MG/1
50 TABLET, EXTENDED RELEASE ORAL DAILY
Qty: 90 TABLET | Refills: 3 | Status: SHIPPED | OUTPATIENT
Start: 2022-10-10

## 2022-10-10 RX ORDER — METOPROLOL SUCCINATE 25 MG/1
TABLET, EXTENDED RELEASE ORAL
Qty: 90 TABLET | Refills: 3 | Status: SHIPPED | OUTPATIENT
Start: 2022-10-10 | End: 2022-10-10

## 2022-10-10 ASSESSMENT — ENCOUNTER SYMPTOMS
VOMITING: 0
SHORTNESS OF BREATH: 0
VOICE CHANGE: 0
STRIDOR: 0
ABDOMINAL DISTENTION: 0
COLOR CHANGE: 0
CHEST TIGHTNESS: 0
TROUBLE SWALLOWING: 0
ANAL BLEEDING: 0
NAUSEA: 0
COUGH: 0
APNEA: 0
BLOOD IN STOOL: 0
CHOKING: 0
WHEEZING: 0
ABDOMINAL PAIN: 0

## 2022-10-10 NOTE — PROGRESS NOTES
[address]  Dept: [address]  Loc: [phone]     10/10/2022       Nestor Santana is here today for   Chief Complaint   Patient presents with    Follow-up           Referring Physician:  No ref. provider found     Patient Active Problem List   Diagnosis    Extramedullary plasmacytoma (HCC)    Obesity (BMI 30.0-34. 9)       Review of Systems   Constitutional:  Negative for activity change, appetite change, fatigue, fever and unexpected weight change. HENT:  Negative for congestion, trouble swallowing and voice change. Eyes:  Negative for visual disturbance. Respiratory:  Negative for apnea, cough, choking, chest tightness, shortness of breath, wheezing and stridor. Cardiovascular:  Negative for chest pain, palpitations and leg swelling. Gastrointestinal:  Negative for abdominal distention, abdominal pain, anal bleeding, blood in stool, nausea and vomiting. Endocrine: Negative for cold intolerance and heat intolerance. Genitourinary:  Negative for hematuria. Musculoskeletal:  Negative for arthralgias, gait problem, joint swelling and myalgias. Skin:  Negative for color change and rash. Allergic/Immunologic: Negative for environmental allergies and food allergies. Neurological:  Negative for dizziness, tremors, syncope, facial asymmetry, weakness, light-headedness, numbness and headaches. Hematological:  Does not bruise/bleed easily. Psychiatric/Behavioral:  Negative for agitation, behavioral problems and sleep disturbance.        Past Medical History:   Diagnosis Date    CAD (coronary artery disease)     COVID-19 2022    Enlarged lymph nodes     Heart disease     Hyperlipidemia     Hypertension     Plasmacytoma ([facility] [address] 2021       Allergies   Allergen Reactions    No Known Allergies        Current Outpatient Medications   Medication Sig Dispense Refill    esomeprazole Magnesium (NEXIUM) 20 MG PACK Take 20 mg by mouth daily      pravastatin (PRAVACHOL) 80 MG tablet Take 1 tablet by mouth daily 90 tablet 3    lisinopril (PRINIVIL;ZESTRIL) 20 MG tablet Take 1 tablet by mouth daily 90 tablet 3    metoprolol succinate (TOPROL XL) 50 MG extended release tablet Take 1 tablet by mouth daily 90 tablet 3    LORATADINE ALLERGY RELIEF PO Take by mouth      acetaminophen (TYLENOL) 325 MG tablet Take 650 mg by mouth every 6 hours as needed for Pain      cetirizine (ZYRTEC) 10 MG tablet Take 10 mg by mouth daily      Multiple Vitamins-Minerals (THERAPEUTIC MULTIVITAMIN-MINERALS) tablet Take 1 tablet by mouth daily      aspirin 81 MG tablet Take 81 mg by mouth daily. No current facility-administered medications for this visit. Social History     Socioeconomic History    Marital status:      Spouse name: Aries Weiner    Number of children: 1    Years of education: None    Highest education level: None   Tobacco Use    Smoking status: Former     Packs/day: 1.00     Years: 37.00     Pack years: 37.00     Types: Cigarettes     Start date: 1964     Quit date: 2001     Years since quittin.0    Smokeless tobacco: Never   Vaping Use    Vaping Use: Never used   Substance and Sexual Activity    Alcohol use: Not Currently     Comment: beer every so often     Drug use: Never       Family History   Problem Relation Age of Onset    Arthritis Mother     Heart Disease Father        Blood pressure (!) 154/82, pulse 77, resp. rate 18, height 5' 10\" (1.778 m), weight 202 lb 9.6 oz (91.9 kg).     Physical Exam:    General Appearance: alert and oriented to person, place and time, in no acute distress  Cardiovascular: normal rate, regular rhythm, normal S1 and S2, no murmurs, rubs, clicks, or gallops, distal pulses intact, no carotid bruits, no JVD  Pulmonary/Chest: clear to auscultation bilaterally- no wheezes, rales or rhonchi, normal air movement, no respiratory distress  Abdomen: soft, non-tender, non-distended, normal bowel sounds, no masses   Extremities: no cyanosis, clubbing or edema, pulse   Skin: warm and dry, no rash or erythema  Head: normocephalic and atraumatic  Eyes: pupils equal, round, and reactive to light  Neck: supple and non-tender without mass, no thyromegaly   Musculoskeletal: normal range of motion, no joint swelling, deformity or tenderness  Neurological: alert, oriented, normal speech, no focal findings or movement disorder noted    Lab Data:    Cardiac Enzymes:  No results for input(s): CKTOTAL, CKMB, CKMBINDEX, TROPONINI in the last 72 hours. CBC:   Lab Results   Component Value Date/Time    WBC 4.5 08/18/2022 11:01 AM    RBC 3.80 08/18/2022 11:01 AM    HGB 11.3 08/18/2022 11:01 AM    HCT 35.6 08/18/2022 11:01 AM     08/18/2022 11:01 AM       CMP:    Lab Results   Component Value Date/Time     08/18/2022 11:01 AM     09/23/2021 02:19 PM    K 4.7 08/18/2022 11:01 AM    K 4.1 09/23/2021 02:19 PM     09/23/2021 02:19 PM    CO2 24 09/23/2021 02:19 PM    BUN 23 09/23/2021 02:19 PM    CREATININE 1.3 08/18/2022 11:01 AM    CREATININE 1.4 09/29/2021 09:30 AM    LABGLOM 49 09/29/2021 09:30 AM    GLUCOSE 118 09/23/2021 02:19 PM    CALCIUM 10.1 09/23/2021 02:19 PM       Hepatic Function Panel:    Lab Results   Component Value Date/Time    ALKPHOS 88 08/18/2022 11:01 AM    ALT 12 08/18/2022 11:01 AM    AST 20 08/18/2022 11:01 AM    PROT 7.9 08/18/2022 11:01 AM    BILITOT 0.4 08/18/2022 11:01 AM    BILIDIR <0.2 08/18/2022 11:01 AM    LABALBU 4.4 08/18/2022 11:01 AM       Magnesium:  No results found for: MG    PT/INR:  No results found for: PROTIME, INR    HgBA1c:  No results found for: LABA1C    FLP:  No results found for: TRIG, HDL, LDLCALC, LDLDIRECT, LABVLDL    TSH:  No results found for: TSH     Diagnosis Orders   1.  Hypertension, unspecified type  19903 - [place] ELECTROCARDIOGRAM, COMPLETE    CBC    Basic Metabolic Panel    Lipid Panel    Hepatic Function Panel      2. Dyslipidemia (high LDL; low HDL)  CBC    Basic Metabolic Panel    Lipid Panel    Hepatic Function Panel           Assessment/Plan    Sudeep Drew is an [de-identified]years old gentleman who is in excellent physical condition for his age he is known to have a history of atherosclerotic coronary artery disease and he has a history of prior intervention of the circumflex back in 04. He is here for a yearly visit he denies chest pain but he has been noticing his blood pressure has been elevated. I increase his lisinopril to from 10 to 20 mg on the Toprol-XL from 25 to 50 mg. He is to continue monitoring his blood pressure and let us know if it is high his EKG shows sinus rhythm rate of 77 and no acute changes. He had a lab work his cholesterol within normal limits as well as his electrolytes he has been on the pravastatin.     The patient has a history of gastroesophageal reflux he taken omeprazole the patient did had a stress Cardiolite test back in 2019 and he has a good exercise tolerance there was no ischemia by the Cardiolite perfusion scan he will continue with the current medication he was advised about diet exercise all his questions were answered to his satisfaction the plan of treatment were discussed with him and he will be seen in 6 months with the lab work he is to seek medical attention if he had any change in clinical condition end of dictation    Orders Placed This Encounter   Procedures    CBC     Standing Status:   Future     Standing Expiration Date:   42/06/1689    Basic Metabolic Panel     Standing Status:   Future     Standing Expiration Date:   10/10/2023    Lipid Panel     Standing Status:   Future     Standing Expiration Date:   10/10/2023     Order Specific Question:   Is Patient Fasting?/# of Hours     Answer:   12 hours    Hepatic Function Panel     Standing Status:   Future     Standing Expiration Date:   10/10/2023    28813 - [place] ELECTROCARDIOGRAM, COMPLETE       Return in about 6 months (around 4/10/2023) for htn.      Mecca Goncalves MD

## 2022-10-12 ENCOUNTER — OFFICE VISIT (OUTPATIENT)
Dept: ONCOLOGY | Age: 82
End: 2022-10-12
Payer: MEDICARE

## 2022-10-12 ENCOUNTER — HOSPITAL ENCOUNTER (OUTPATIENT)
Dept: INFUSION THERAPY | Age: 82
Discharge: HOME | End: 2022-10-12
Payer: MEDICARE

## 2022-10-12 VITALS
HEART RATE: 90 BPM | DIASTOLIC BLOOD PRESSURE: 84 MMHG | OXYGEN SATURATION: 95 % | SYSTOLIC BLOOD PRESSURE: 142 MMHG | WEIGHT: 199 LBS | HEIGHT: 70 IN | RESPIRATION RATE: 16 BRPM | BODY MASS INDEX: 28.49 KG/M2 | TEMPERATURE: 98.1 F

## 2022-10-12 VITALS
DIASTOLIC BLOOD PRESSURE: 84 MMHG | RESPIRATION RATE: 16 BRPM | TEMPERATURE: 98.1 F | HEART RATE: 90 BPM | SYSTOLIC BLOOD PRESSURE: 142 MMHG

## 2022-10-12 DIAGNOSIS — D47.2 MGUS (MONOCLONAL GAMMOPATHY OF UNKNOWN SIGNIFICANCE): ICD-10-CM

## 2022-10-12 DIAGNOSIS — C90.31 SOLITARY PLASMACYTOMA IN REMISSION (HCC): ICD-10-CM

## 2022-10-12 DIAGNOSIS — R59.9 ADENOPATHY: Primary | ICD-10-CM

## 2022-10-12 DIAGNOSIS — R91.8 LUNG MASS: ICD-10-CM

## 2022-10-12 DIAGNOSIS — C90.20 EXTRAMEDULLARY PLASMACYTOMA NOT HAVING ACHIEVED REMISSION (HCC): ICD-10-CM

## 2022-10-12 LAB
ABSOLUTE IMMATURE GRANULOCYTE: 0.04 THOU/MM3 (ref 0–0.07)
ALBUMIN SERPL-MCNC: 4.2 G/DL (ref 3.5–5.1)
ALP BLD-CCNC: 86 U/L (ref 38–126)
ALT SERPL-CCNC: 8 U/L (ref 11–66)
AST SERPL-CCNC: 16 U/L (ref 5–40)
BASINOPHIL, AUTOMATED: 0 % (ref 0–3)
BASOPHILS ABSOLUTE: 0 THOU/MM3 (ref 0–0.1)
BILIRUB SERPL-MCNC: 0.3 MG/DL (ref 0.3–1.2)
BILIRUBIN DIRECT: < 0.2 MG/DL (ref 0–0.3)
BUN, WHOLE BLOOD: 22 MG/DL (ref 8–26)
CHLORIDE, WHOLE BLOOD: 104 MEQ/L (ref 98–109)
CREATININE, WHOLE BLOOD: 1.2 MG/DL (ref 0.5–1.2)
EOSINOPHILS ABSOLUTE: 0.1 THOU/MM3 (ref 0–0.4)
EOSINOPHILS RELATIVE PERCENT: 2 % (ref 0–4)
GFR, ESTIMATED: 62 ML/MIN/1.73M2
GLUCOSE, WHOLE BLOOD: 113 MG/DL (ref 70–108)
HCT VFR BLD CALC: 36.4 % (ref 42–52)
HEMOGLOBIN: 11.6 GM/DL (ref 14–18)
IGG: 1199 MG/DL (ref 700–1600)
IGM: 63 MG/DL (ref 40–230)
IMMATURE GRANULOCYTES: 1 %
IONIZED CALCIUM, WHOLE BLOOD: 1.22 MMOL/L (ref 1.12–1.32)
LYMPHOCYTES # BLD: 16 % (ref 15–47)
LYMPHOCYTES ABSOLUTE: 1 THOU/MM3 (ref 1–4.8)
MCH RBC QN AUTO: 29.1 PG (ref 26–33)
MCHC RBC AUTO-ENTMCNC: 31.9 GM/DL (ref 32.2–35.5)
MCV RBC AUTO: 92 FL (ref 80–94)
MONOCYTES ABSOLUTE: 0.5 THOU/MM3 (ref 0.4–1.3)
MONOCYTES: 8 % (ref 0–12)
PDW BLD-RTO: 14 % (ref 11.5–14.5)
PLATELET # BLD: 219 THOU/MM3 (ref 130–400)
PMV BLD AUTO: 10 FL (ref 9.4–12.4)
POTASSIUM, WHOLE BLOOD: 4.2 MEQ/L (ref 3.5–4.9)
RBC # BLD: 3.98 MILL/MM3 (ref 4.7–6.1)
SEG NEUTROPHILS: 73 % (ref 43–75)
SEGMENTED NEUTROPHILS ABSOLUTE COUNT: 4.5 THOU/MM3 (ref 1.8–7.7)
SODIUM, WHOLE BLOOD: 142 MEQ/L (ref 138–146)
TOTAL CO2, WHOLE BLOOD: 26 MEQ/L (ref 23–33)
TOTAL PROTEIN: 7.5 G/DL (ref 6.1–8)
WBC # BLD: 6.2 THOU/MM3 (ref 4.8–10.8)

## 2022-10-12 PROCEDURE — 85025 COMPLETE CBC W/AUTO DIFF WBC: CPT

## 2022-10-12 PROCEDURE — 84155 ASSAY OF PROTEIN SERUM: CPT

## 2022-10-12 PROCEDURE — G8484 FLU IMMUNIZE NO ADMIN: HCPCS | Performed by: INTERNAL MEDICINE

## 2022-10-12 PROCEDURE — 82784 ASSAY IGA/IGD/IGG/IGM EACH: CPT

## 2022-10-12 PROCEDURE — 80047 BASIC METABLC PNL IONIZED CA: CPT

## 2022-10-12 PROCEDURE — 83883 ASSAY NEPHELOMETRY NOT SPEC: CPT

## 2022-10-12 PROCEDURE — 99211 OFF/OP EST MAY X REQ PHY/QHP: CPT

## 2022-10-12 PROCEDURE — 1036F TOBACCO NON-USER: CPT | Performed by: INTERNAL MEDICINE

## 2022-10-12 PROCEDURE — G8417 CALC BMI ABV UP PARAM F/U: HCPCS | Performed by: INTERNAL MEDICINE

## 2022-10-12 PROCEDURE — 84165 PROTEIN E-PHORESIS SERUM: CPT

## 2022-10-12 PROCEDURE — 99214 OFFICE O/P EST MOD 30 MIN: CPT | Performed by: INTERNAL MEDICINE

## 2022-10-12 PROCEDURE — 36415 COLL VENOUS BLD VENIPUNCTURE: CPT

## 2022-10-12 PROCEDURE — 1123F ACP DISCUSS/DSCN MKR DOCD: CPT | Performed by: INTERNAL MEDICINE

## 2022-10-12 PROCEDURE — 80076 HEPATIC FUNCTION PANEL: CPT

## 2022-10-12 PROCEDURE — G8427 DOCREV CUR MEDS BY ELIG CLIN: HCPCS | Performed by: INTERNAL MEDICINE

## 2022-10-12 NOTE — PATIENT INSTRUCTIONS
Follow-up 6 months with nurse practitioner or me. 4/19/2023  2 weeks before that appointment patient will have a CT of the chest with contrast 4/12/2023  2 weeks before that appointment the patient will have myeloma labs.   4/12/2023

## 2022-10-12 NOTE — PROGRESS NOTES
[address] CANCER [facility] [address] [facility]  Dept: [phone]  Loc: [phone]   Hematology/Oncology Consult (Clinic)        10/12/2022    Wilfrid Stanton   1940     No ref. provider found [facility]  Shay Bergeron DO PCP is in [place]. DIAGNOSIS:  -Extramedullary plasmacytoma left neck (biopsy and t path at [facility]) 8/24/2021. Bone marrow biopsy-uninvolved. IgA 259/normal [id number]/minimally elevated, IgM 93/normal  Beta-2 microglobulin 3.1/mildly elevated  Serum immunofixation shows normal pattern with no monoclonal proteins. Serum free kappa light chains 84.82, lambda 37.03, ratio elevated at 2.29 (0.26-1.65)    -Abnormal PET scan (10/7/2021):  with multiple FDG avid mediastinal nodes. Representative subcarinal node 1.9 cm short axis with SUV of 4.9. Nonenlarged left hilar node with SUV of 3.2. Left inguinal node measuring 1 cm short axis with SUV of 4.6. Not palpable on exam.  Indeterminate 9 mm left apical lung nodule. No bone lesions. 10/22/2021 Dr. Stacy Briones EBUS-unremarkable although final cytology from station 7 core biopsy pending     -Indeterminate 9 mm left apex lung nodule mildly FDG avid with SUV of 1.8. Ongoing surveillance. TREATMENT:  - completed radiation to the left neck per Dr. Florentino Bar 1/12/2022.  -s/p  [facility] EBUS for histology of the mediastinal nodes. Subcarinal node likely the best target. Path including subcarinal node all benign. PARAMETERS:  -Neck CT 6/21/2022-no evidence of disease  -PET/CT 10/7/2021  -Chest CT with contrast 10/4/2022  -Left neck exam; node excised. -Myeloma parameters ; serum light chains with elevated ratio. SUBJECTIVE: Patient is asymptomatic and feels great. Here for routine follow-up. No night sweats fevers or weight loss. Sense of wellbeing is great. Here for myeloma primary labs.   Reviewed myeloma labs from August which are unremarkable and stable. Reviewed that the next CT chest will be done in April before his next follow-up visit. No lung complaints. His wife will be leaving for [place] in 1 week and will return in early April. HPI: 81 yo male with L neck mass with recent [facility] bx reveaaling a plasma cell neoplasm/ possible plasmacytoma. Patient cancelled heme appt  due on  as he wanted all care closer to home. Otolaryngology care per Dr Lisa Jane at [facility]. Patient had  Covid on , outpt mngmt and recovered. April noted lump L neck under the jaw. Since core bx has been done, he states the mass is much smaller. Since April ,legs a bit weaker bilaterally but no pain except in knees. Overall describes a sense of wellbeing is stable and unchanged. Denies any drenching sweats fevers or weight loss. Denies any midline back pain or pain in his upper arms or legs. Denies any frequent or severe infections. He had Covid vaccination done in April. ROS:  Review of Systems 14 point negative except as detailed above. PMH:   Past Medical History:   Diagnosis Date    CAD (coronary artery disease)     COVID-19     Enlarged lymph nodes     Heart disease     Hyperlipidemia     Hypertension     Plasmacytoma ([address]     Stents x 2 . No MI,angina ,chf  Denies history of diabetes or stroke.     Social HX:   Social History     Socioeconomic History    Marital status:      Spouse name: Shanice Schroeder    Number of children: 1    Years of education: Not on file    Highest education level: Not on file   Occupational History    Not on file   Tobacco Use    Smoking status: Former     Packs/day: 1.00     Years: 37.00     Pack years: 37.00     Types: Cigarettes     Start date: 1964     Quit date: 2001     Years since quittin.0    Smokeless tobacco: Never   Vaping Use    Vaping Use: Never used   Substance and Sexual Activity    Alcohol use: Not Currently     Comment: beer every so often     Drug use: Never    Sexual activity: Not on file   Other Topics Concern    Not on file   Social History Narrative    Not on file     Social Determinants of Health     Financial Resource Strain: Not on file   Food Insecurity: Not on file   Transportation Needs: Not on file   Physical Activity: Not on file   Stress: Not on file   Social Connections: Not on file   Intimate Partner Violence: Not on file   Housing Stability: Not on file      [address], [facility]. 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 SpouseAugusta Fears    Phone: [phone] [address] [facility] [address]     Employment:  Retired [employer]    Immunizations:  Immunization History   Administered Date(s) Administered    COVID-19, [address] [facility] [address], Primary or Immunocompromised, (age 12y+), IM, 100 mcg/0.5mL 11/17/2021      Covid and pneumonia vaccine    Health Screenings:    C-Scope: 15 yrs  Prostate: ok          Health Maintenance Due   Topic Date Due    Pneumococcal 65+ years Vaccine (1 - PCV) Never done    Lipids  Never done    Depression Screen  Never done    DTaP/Tdap/Td vaccine (1 - Tdap) Never done    Shingles vaccine (1 of 2) Never done    Annual Wellness Visit (AWV)  Never done    COVID-19 Vaccine (2 - Moderna risk series) 12/15/2021    Flu vaccine (1) Never done        Interests:   Not reviewed    Fam HX:   No Cancer hx    Hospitalizations:   None recent    Allergies:   Allergies   Allergen Reactions    No Known Allergies         Adult Illness:  Patient Active Problem List   Diagnosis    Extramedullary plasmacytoma (HCC)    Obesity (BMI 30.0-34.9)    see above    Surgery:  Past Surgical History:   Procedure Laterality Date    ABSCESS DRAINAGE      SPIDER BITE    BRONCHOSCOPY N/A 10/22/2021    BRONCHOSCOPY W/EBUS FNA performed by Gokul Kolb MD at [address] [place] [address]  2004    COLONOSCOPY      CT BONE MARROW BIOPSY  9/29/2021    CT BONE MARROW BIOPSY 9/29/2021 [facility] CT SCAN    HERNIA REPAIR 2016        Medications:  Current Outpatient Medications   Medication Sig Dispense Refill    esomeprazole Magnesium (NEXIUM) 20 MG PACK Take 20 mg by mouth daily      pravastatin (PRAVACHOL) 80 MG tablet Take 1 tablet by mouth daily 90 tablet 3    lisinopril (PRINIVIL;ZESTRIL) 20 MG tablet Take 1 tablet by mouth daily 90 tablet 3    metoprolol succinate (TOPROL XL) 50 MG extended release tablet Take 1 tablet by mouth daily 90 tablet 3    LORATADINE ALLERGY RELIEF PO Take by mouth      acetaminophen (TYLENOL) 325 MG tablet Take 650 mg by mouth every 6 hours as needed for Pain      cetirizine (ZYRTEC) 10 MG tablet Take 10 mg by mouth daily      Multiple Vitamins-Minerals (THERAPEUTIC MULTIVITAMIN-MINERALS) tablet Take 1 tablet by mouth daily      aspirin 81 MG tablet Take 81 mg by mouth daily. No current facility-administered medications for this visit. EXAM:    BP (!) 142/84 (Site: Right Upper Arm, Position: Sitting, Cuff Size: Medium Adult)   Pulse 90   Temp 98.1 °F (36.7 °C) (Oral)   Resp 16   Ht 5' 10\" (1.778 m)   Wt 199 lb (90.3 kg)   SpO2 95%   BMI 28.55 kg/m²      ECO  General: Non-ill appearing. Appears younger than his stated age. HEENT: NC/AT,nonicteric, perrla,eom intact, no mucosal lesions, mildly hard of hearing. Neck: normal thyroid, no masses. pulses nl, no bruits,   Nodes: No adenopathy. Lung: No wheezing , clear  CV: rrr, no rubs ,gallops or murmurs  Breasts: normal exam  Abd/Rectal: soft, non-tender,bowel sounds normal , no HSM,no masses  Back: normal curvature, No midline tenderness. flanks nontender  : Not Examined  Extremities: no cyanosis,clubbing or edema. Skin: unremarkable  Neuro: A and O x 4, CN exam nonfocal, Motor- no deficits, Sensory- no deficits, gait-nl, speech- fluent, no ataxia. Devices: none      DATA:    LAB:   2022 myeloma parameters including light chains.   Creatinine 1.3, ionized calcium normal at 1.25,  White count 4.5 hemoglobin 11.3 platelet count 435  TFF-240, IgG 1288, IgM 61  Urine immunofixation no monoclonal protein  Serum kappa light chains 40.7 lambda 23.3 ratio 1.74    See 9/23 labs above including IgA 259, IgG 1961, IgM 93, beta-2 microglobulin 3.1  SPEP and serum immune fixation-no monoclonal protein  Serum free kappa light chains 84.8, lambda 37.0, ratio 2.29 (0.26-1.65)    4/26/2022 labs showed a serum protein electrophoresis that was normal and immunofixation with no monoclonal proteins. Kappa lambda light chain 38.5, lambda 22.1 ratio 1.74. IMAGING:  Chest CT with contrast 10/4/2022 compared with 5/12/2022  Decreased size of the left upper lobe groundglass opacity with stable curvilinear and nodular scar. Right hilar adenopathy with a node measuring 1.5 cm. Difficulty determine the difference compared to prior exam which was noncontrast.    CT soft tissue neck with contrast 6/20/2022  No evidence of cervical adenopathy or mass lesion. .  Persistent left upper lobe groundglass opacity      PROCEDURE: CT CHEST WO CONTRAST 5/12/2022       CLINICAL INFORMATION: Mediastinal adenopathy . TECHNIQUE: 2-D multiplanar noncontrast CT chest at 5 mm intervals   All CT scans at this facility use dose modulation, iterative reconstruction, and/or weight-based dosing when appropriate to reduce radiation dose to as low as reasonably achievable. COMPARISON: PET CT 10/7/2021           FINDINGS: Evaluation for adenopathy is limited without IV contrast.    No mediastinal, hilar, or axillary lymphadenopathy is currently identified. Borderline sized left hilar node measuring 9 mm. Previously seen mediastinal pretracheal and precarinal nodes measure 6 mm short axis and 4 mm short axis. Subcarinal node measures 9 mm short axis. Subcarinal node previously measured 12 mm. There are calcified subcarinal and left hilar lymph nodes. Heart size is normal. There is no pericardial effusion.  There is aneurysmal dilatation of the ascending aorta up to 4.3 cm in AP diameter at the right pulmonary artery. Lungs    Irregular linear spiculated density left apex measuring 9 mm. This is contiguous with a focal ground glass opacity measuring 11 mm. The groundglass opacity has slightly increased from the prior exam.    There are calcified granulomas in both lungs. There are no airspace consolidations or pleural effusions. Upper abdomen   Moderate-sized hiatal hernia. Cholelithiasis. There are no suspicious abnormalities. MSK no suspicious bone lesions. Impression   Irregular linear nodular density left apex with adjacent subsolid nodule/focal ground glass opacity. This opacity has minimally increased since the prior exam   Multiple nonenlarged mediastinal, subcarinal and left hilar nodes. **This report has been created using voice recognition software. It may contain minor errors which are inherent in voice recognition technology. **             PROCEDURE: PET CT SKULL BASE TO MID THIGH    10/7/21       CLINICAL INFORMATION: 82-year-old man with extra medullary plasmacytoma in the left neck; initial treatment strategy. Radiopharmaceutical: 15.6 mCi F-18 FDG, intravenously. TECHNIQUE: PET/CT imaging was performed from the skull base to the midthigh levels using routine PET acquisition. Injection site: Left antecubital fossa. Time of FDG injection: 8:51 AM.       Serum glucose: 110 mg/dL at 8:49 AM.       Time of imagin:58 AM.       COMPARISON: None       FINDINGS:        Neck: Confusion confluent left-sided level 2 cervical lymph nodes measure 1.2 cm in short axis diameter and are associated with a maximum SUV of 2.3 (image 50). Chest: Cardiac size is normal. Atherosclerotic calcifications are present in the thoracic aorta and coronary arteries. 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  There is no pleural or    pericardial effusion. Calcified granulomas are present in the bilateral lungs. There is scarring at the bilateral lung apices. An indistinct 0.9 cm density in the left lung apex has a maximum SUV of 1.8 (image 94). Minimal alveolar opacities at the    bilateral lung bases are likely infectious or inflammatory. There are multiple FDG avid mediastinal lymph nodes. A representative subcarinal lymph node measures 1.9 cm in short axis diameter and has a maximum SUV of 4.9 (image 115). A nonenlarged left    hilar lymph node has a maximum SUV of 3.2 (image 108). There is no FDG avid axillary lymphadenopathy. Activity associated with a small hiatal hernia may be infectious or inflammatory. Degenerative changes are present in the thoracic spine without    evidence of hypermetabolic osseous metastatic disease. Periarticular activity at the bilateral shoulders is likely degenerative. Abdomen/pelvis: Physiologic activity is present in the liver, spleen, urinary collecting system and gastrointestinal tract. There is a calcification in the lumen of the gallbladder. There is mild fatty replacement of the pancreas. Atherosclerotic    calcific lesions are present in the abdominal aorta without evidence of aneurysm. There are diverticula in sigmoid colon without evidence of diverticulitis. The prostate gland is enlarged and contains calcifications. There is no FDG avid mesenteric,    retroperitoneal or pelvic lymphadenopathy. A left inguinal lymph node which measures 1.0 cm in short axis diameter has a maximum SUV of 4.6 (image 268). Degenerative changes are seen in the lumbar spine and pelvis without evidence of hypermetabolic    osseous metastatic disease. Impression   1. Confluent mildly FDG avid left cervical lymphadenopathy which likely corresponds to known malignancy. 2. FDG avid mediastinal, left hilar and left inguinal lymphadenopathy suspicious for metastatic disease.    3. Small, mildly FDG avid density at the left lung apex of indeterminant etiology. Malignancy cannot be excluded. Final report electronically signed by Dr. Gretchen Sandoval on 10/7/2021 11:13 AM       Order History    Open Order Details   PACS Images     Show images for PET CT SKULL BASE TO MID THIGH   Results History Report    View Report   Associated Diagnoses    Extramedullary plasmacytoma not having achieved remission [facility])       External Result Report    External Result Report   Existing Charges    Charge Line Charge Code Status Charge Trigger Charge Type   [id number] Hc Pet/ct Skull Base To Mid Thigh [[id number]] [address] [facility] end exam Technical   [id number] [facility] THERAPY HYPERTHYROID SUBSEQUENT [address] (STR)  Imaging result study Professional   Order Report     Order Details        PROCEDURES:   Bone marrow biopsy 9/29/2021  Normocellular marrow flow cytometry unremarkable. No monoclonal plasma cells detected. Bronch/EBUS 10/22/21  This included BAL of the right middle lobe. Normal airways. Mediastinal lymphadenopathy. Complete ultrasound lymph node exam performed including stations 2, 4, 7, 10 and 11. Station 7 lymph nodes were heterogeneous in appearance, irregular and enlarged with calcifications. 12 passes on station 7. Airways are normal.  Abnormal appearance of station 7 lymph nodes, normal lymphocytes seen no malignancy identified. FINAL RESULTS:    A.  Bronchoalveolar lavage:                  No malignant cells seen. Alveolar macrophages. B.  Lymph node, station 7, fine-needle aspirate:                  No malignant cells seen. Benign appearing lymphoid sample.      Lymph node bx [facility] 8/24/21  Case Report   Surgical Pathology Report                         Case: [id number]                                   Authorizing Provider:  Binu Moraes MD              Collected:           08/24/2021 03:19 PM Ordering Location:     [facility]               Received:            08/24/2021 04:27 PM           Pathologist:           Tara Jose MD                                                             Specimens:   A) - SURG PATH, left neck level II LN for lymphoma work up                                          B) - [address], left neck level II LN                                                       Clinical History      Preop Diagnosis:  Please perform core biopsy of left level II lymph node - will need to send tissue for flow for workup for possible lymphoma. Associated Diagnosis:  Follow-up, Z09. Pathologic Diagnosis      A. Lymph node, left neck level II, biopsy:  Plasma cell neoplasm, see comment. Electronically signed by Tara Jose MD on 8/27/2021 at 11:54 AM    Diagnosis Comments      The biopsy shows effaced lymph node with infiltrations of sheets of mature plasma cells admixed with small lymphocytes. Touch print shows similar morphology. The plasma cells are positive for FdGV36h,  and is Kappa+ light chain restricted (Julissa King). They are negative for CD5, Cyclin D1, Cd20, Cd117, and Lambda-WOJCIECH. The background small lymphocytes are positive for T cells (30%), and a fewer B-cells (10%). The Ki67 show approx. 10-20% positivity and most are those reactive T-cells. The findings are consistent with Plasmacytoma. A differential diagnosis of lymphoplasmacytic lymphoma or marginal zone lymphoma is considered but given the background of few B-cells these findings are unlikely. Correlation with IgH and molecular study is recommended. Correlation with systemic examination and serum test is recommended to rule out multiple myeloma. All controls show appropriate reactivity.      All immunohistochemistry, in situ hybridization, and histochemical tests were developed by and are performed at the [address] [place] [address], [address], [address], [address]. All tests reported here, except those addressing HER2 overexpression as a predictive marker, have not been cleared by or approved by the [employer] and Drug Administration (FDA). The laboratory is regulated under CLIA as qualified to perform high-complexity testing. The tests are used for clinical purposes. They should not be regarded as investigational or for research. Addendum      IHC for CD3 highlights T-cells similar to those of Cd5. MUM1 are positive on the plasma cells similar to those of Cd138. The Lambda-WOJCIECH show few plasma cells and K:L ratio>20:1. The findings confirm the final diagnosis. All controls show appropriate reactivity. All immunohistochemistry, in situ hybridization, and histochemical tests were developed by and are performed at the [address] [place] [address], Luiza Gregory [address] [place] [address]. All tests reported here, except those addressing HER2 overexpression as a predictive marker, have not been cleared by or approved by the [employer] and Drug Administration (FDA). The laboratory is regulated under CLIA as qualified to perform high-complexity testing. The tests are used for clinical purposes. They should not be regarded as investigational or for research. Addendum electronically signed by Siri Vaca MD on 8/31/2021 at  9:28 AM    Addendum 2      IgH PCR shows predominant monoclonal patterns, consistent with the presence of a clonalm B-cell or plasma cell population. The final diagnosis with differential diagnosis remains unchanged. Addendum electronically signed by Siri Vaca MD on 9/2/2021 at  8:12 AM    Microscopic Description      A microscopic examination was performed. Synoptic report  Specimen Site: Lymph node, left neck level II  Procedure: Biopsies  Diagnosis:  Plasma cell neoplasm, see comment.      Microscopic description: The H&E section reveal a completely effaced lymphoid tissue with diffuse infiltrate with predominantly plasma cells admixed with small lymphoid cells. There are no large atypical lymphocytes present. --Ancillary Studies--  Flow cytometry: Not performed  Molecular studies: Submitted for IgH PCR and result will be followed in the addendum. Special stains: Not performed  Immunohistochemistry (IHC)/in situ hybridization (WOJCIECH):     Neoplastic cells are positive for: CD19, , and Kappa WOJCIECH and plasma cell antigen (Vs38C). Neoplastic cells are negative for: CD5, CD10, CD20, PAX5, BCL1, Lambda WOJCIECH, and Ki67 (10% by manual quantification method)  Other IHC findings: see comment. IHC for Kappa and Lambda is unremarkable on both B and plasma cells. CD10 is virtually negative on the biopsy. CD20 and PAX5 show a few B-cell clusters (<10%) and they are negative for Cd5, Cd10, and BCL1. HEME Mutation Panel  Inactivating and splice site TET2 mutations detected. No MYD88, CARD11, CXCR4, KLF2, NOTCH1, NOTCH2 or any other mutations associated wtih specific lymphoma types are noted. en cleared by or approved by the [employer] and Drug Administration (FDA). The laboratory is regulated under CLIA as qualified to perform high-complexity testing. The tests are used for clinical purposes. They should not be regarded as investigational or for research. Images          For Immediate Release to Patient's BRAINREPUBLIChart? Yes  [address]   Lab and Collection        PATHOLOGY:   Clinical History 7/22/21      Mass left parotid. *CYTOLOGIC DIAGNOSIS*      A. LEFT PAROTID MASS, FNA (CYTOLOGY AND CELL BLOCK):       FINAL DIAGNOSIS:  Non Diagnostic  Normal Salivary Gland Elements Only        FNA Performed By:  Clinician       Electronically signed by Wong Hilario MD on 7/23/2021 at  4:57 PM      Clinical Information: EXTRAMEDULLARY PLASMACYTOMA NOT HAVING ACHIEVED   REMISSION, C90.20 9/29/21    FINAL DIAGNOSIS:   Bone marrow core, clot, and aspirate smear, site unspecified:    Normocellular bone marrow (30-40%) with orderly trilineage   hematopoiesis      and 2% polyclonal mature plasma cells. No significant morphologic or flow cytometric evidence of a monoclonal       plasma cell neoplasm. Adequate iron stores. Peripheral blood: mild anemia. Bronch/EBUS Dr Ignacia Martinez 10/22/21    GENETICS:  See above    MOLECULAR:  See above    ASSESSMENT/PLAN:    1: Diagnosis: 63-year-old gentleman with a left neck mass on core biopsy at [facility] favors a plasmacytoma. Patient needs thorough evaluation to rule out myeloma. He is asymptomatic. Bone marrow biopsy is uninvolved histologically and by flow cytometry. PET scan does not show findings suggestive of myeloma in particular no bone lesions. The findings of mediastinal node uptake is unexpected and needs [facility] EBUS to work this up further. Indeterminate 9 mm left apical lesion as well. [facility] EBUS report reviewed and showed normal airways nodes unremarkable other than the station 7 subcarinal node. Both the bronchoalveolar lavage and the needle aspiration of the subcarinal node show no malignancy. Therefore, status radiation completed January 2022 for treatment of what appears to be an isolated left neck plasmacytoma. 2) Prognosis / Disease Status: Excellent of a solitary plasmacytoma. Studies ongoing follow-up regarding his light chain ratio and also of the adenopathy in the mediastinum although biopsied as benign. He also has a linear density in the left lung apex that is being followed closely. 3) Work-up:    Labs: 8/18/2022 labs reviewed above and unremarkable. Repeat labs drawn 11/12 and are pending. Imaging: June 2022 neck CT unremarkable. CT with contrast 10/4/2022 compared to May is improved. Needs follow-up on a right hilar node.   Chest CT will be done in April 1 week before his follow-up with me       Procedures: Bone marrow aspiration biopsy for ruling out myeloma. Results reviewed and are negative. Consults: Dr. Fatemeh Andrew pulmonary for bronchoscopy EBUS specifically to biopsy the subcarinal node. This was done 10/22. See above. Airways normal lymph nodes normal other than station 7. Station 7 biopsy/cytology benign and BAL benign. If negative choices include follow-up chest CT imaging in 2 months versus mediastinoscopy. Other: Light chain MGUS. 4) Symptom Management: None needed      5) Supportive care provided. Level of care is appropriate. Teaching done today. Reviewed that it is important that the chest CT be done in a couple months to follow-up on the uncertain linear opacity in the left lung apex. 6) Treatment goal: Cure      Treatment plan:     Completed radiation the left neck solitary plasmacytoma by Dr. Sincere Perdue. 7) Medications reviewed. Prescriptions today: None            No orders of the defined types were placed in this encounter. [place]ARRS:  Controlled Substance Monitoring:    Acute and Chronic Pain Monitoring:   No flowsheet data found. 8) Research Options:   None at this time      9) Other:        None    10) Follow Up:  Patient will return from [place] in early April. Therefore able 12 he will have a follow-up chest CT. I will do that without contrast because of his creatinine.   He will have myeloma labs then and follow-up with me 1 week later      Jenaro Horvath MD

## 2022-10-13 LAB — IGA: 168 MG/DL (ref 70–400)

## 2022-10-14 LAB — KAPPA/LAMBDA FREE LIGHT CHAINS: NORMAL

## 2022-10-15 LAB — MISC. #1 REFERENCE GROUP TEST: NORMAL

## 2022-10-27 ENCOUNTER — APPOINTMENT (OUTPATIENT)
Dept: INTERVENTIONAL RADIOLOGY/VASCULAR | Age: 82
End: 2022-10-27
Payer: MEDICARE

## 2022-10-27 ENCOUNTER — APPOINTMENT (OUTPATIENT)
Dept: CT IMAGING | Age: 82
End: 2022-10-27
Payer: MEDICARE

## 2022-10-27 ENCOUNTER — HOSPITAL ENCOUNTER (EMERGENCY)
Age: 82
Discharge: HOME | End: 2022-10-27
Payer: MEDICARE

## 2022-10-27 ENCOUNTER — APPOINTMENT (OUTPATIENT)
Dept: GENERAL RADIOLOGY | Age: 82
End: 2022-10-27
Payer: MEDICARE

## 2022-10-27 VITALS
HEART RATE: 72 BPM | OXYGEN SATURATION: 98 % | SYSTOLIC BLOOD PRESSURE: 161 MMHG | TEMPERATURE: 95.2 F | DIASTOLIC BLOOD PRESSURE: 74 MMHG | RESPIRATION RATE: 17 BRPM

## 2022-10-27 DIAGNOSIS — R91.8 OPACITY OF LUNG ON IMAGING STUDY: ICD-10-CM

## 2022-10-27 DIAGNOSIS — M79.89 LEFT ARM SWELLING: Primary | ICD-10-CM

## 2022-10-27 LAB
ANION GAP SERPL CALCULATED.3IONS-SCNC: 12 MEQ/L (ref 8–16)
BASOPHILS # BLD: 0.2 %
BASOPHILS ABSOLUTE: 0 THOU/MM3 (ref 0–0.1)
BUN BLDV-MCNC: 25 MG/DL (ref 7–22)
CALCIUM SERPL-MCNC: 9.8 MG/DL (ref 8.5–10.5)
CHLORIDE BLD-SCNC: 104 MEQ/L (ref 98–111)
CO2: 24 MEQ/L (ref 23–33)
CREAT SERPL-MCNC: 1.3 MG/DL (ref 0.4–1.2)
EOSINOPHIL # BLD: 2 %
EOSINOPHILS ABSOLUTE: 0.1 THOU/MM3 (ref 0–0.4)
ERYTHROCYTE [DISTWIDTH] IN BLOOD BY AUTOMATED COUNT: 14.1 % (ref 11.5–14.5)
ERYTHROCYTE [DISTWIDTH] IN BLOOD BY AUTOMATED COUNT: 47.2 FL (ref 35–45)
GFR SERPL CREATININE-BSD FRML MDRD: 55 ML/MIN/1.73M2
GLUCOSE BLD-MCNC: 98 MG/DL (ref 70–108)
HCT VFR BLD CALC: 35.3 % (ref 42–52)
HEMOGLOBIN: 11.2 GM/DL (ref 14–18)
IMMATURE GRANS (ABS): 0.08 THOU/MM3 (ref 0–0.07)
IMMATURE GRANULOCYTES: 1.3 %
LYMPHOCYTES # BLD: 16.4 %
LYMPHOCYTES ABSOLUTE: 1 THOU/MM3 (ref 1–4.8)
MCH RBC QN AUTO: 29.2 PG (ref 26–33)
MCHC RBC AUTO-ENTMCNC: 31.7 GM/DL (ref 32.2–35.5)
MCV RBC AUTO: 91.9 FL (ref 80–94)
MONOCYTES # BLD: 10.4 %
MONOCYTES ABSOLUTE: 0.6 THOU/MM3 (ref 0.4–1.3)
NUCLEATED RED BLOOD CELLS: 0 /100 WBC
OSMOLALITY CALCULATION: 283.8 MOSMOL/KG (ref 275–300)
PLATELET # BLD: 265 THOU/MM3 (ref 130–400)
PMV BLD AUTO: 10 FL (ref 9.4–12.4)
POTASSIUM SERPL-SCNC: 4.6 MEQ/L (ref 3.5–5.2)
RBC # BLD: 3.84 MILL/MM3 (ref 4.7–6.1)
SEG NEUTROPHILS: 69.7 %
SEGMENTED NEUTROPHILS ABSOLUTE COUNT: 4.2 THOU/MM3 (ref 1.8–7.7)
SODIUM BLD-SCNC: 140 MEQ/L (ref 135–145)
WBC # BLD: 6 THOU/MM3 (ref 4.8–10.8)

## 2022-10-27 PROCEDURE — 71260 CT THORAX DX C+: CPT

## 2022-10-27 PROCEDURE — 6360000004 HC RX CONTRAST MEDICATION: Performed by: PHYSICIAN ASSISTANT

## 2022-10-27 PROCEDURE — 93971 EXTREMITY STUDY: CPT

## 2022-10-27 PROCEDURE — 70491 CT SOFT TISSUE NECK W/DYE: CPT

## 2022-10-27 PROCEDURE — 80048 BASIC METABOLIC PNL TOTAL CA: CPT

## 2022-10-27 PROCEDURE — 36415 COLL VENOUS BLD VENIPUNCTURE: CPT

## 2022-10-27 PROCEDURE — 85025 COMPLETE CBC W/AUTO DIFF WBC: CPT

## 2022-10-27 PROCEDURE — 73130 X-RAY EXAM OF HAND: CPT

## 2022-10-27 PROCEDURE — 99285 EMERGENCY DEPT VISIT HI MDM: CPT

## 2022-10-27 RX ADMIN — IOPAMIDOL 80 ML: 755 INJECTION, SOLUTION INTRAVENOUS at 13:07

## 2022-10-27 ASSESSMENT — ENCOUNTER SYMPTOMS
WHEEZING: 0
NAUSEA: 0
EYE DISCHARGE: 0
PHOTOPHOBIA: 0
SHORTNESS OF BREATH: 0
VOMITING: 0
SORE THROAT: 0
EYE PAIN: 0
COUGH: 0
RHINORRHEA: 0
TROUBLE SWALLOWING: 0
COLOR CHANGE: 0
VOICE CHANGE: 0
BACK PAIN: 0
DIARRHEA: 0
CHEST TIGHTNESS: 0

## 2022-10-27 ASSESSMENT — PAIN - FUNCTIONAL ASSESSMENT: PAIN_FUNCTIONAL_ASSESSMENT: NONE - DENIES PAIN

## 2022-10-27 NOTE — ED PROVIDER NOTES
[facility] [address] [address] EMERGENCY DEPT  [address] [facility] [address]  Phone: [phone]        CHIEF COMPLAINT       Chief Complaint   Patient presents with    Joint Swelling       Nurses Notes reviewed and I agree except as notedin the HPI. HISTORY OF PRESENT ILLNESS    Vola Nageotte is a [de-identified] y.o. male with past medical history of hyperlipidemia, CAD, hypertension, extramedullary plasmacytoma of left neck and who presents to the ED for left hand swelling for 4 weeks. Patient denies injury to the upper arm or hand. He states he has some medial elbow tenderness with palpation. Denies numbness and paresthesias. Sensation intact. He notes it is mildly painful when attempting to make a fist and is unable to close his hand completely. Denies headache, changes in speech, changes in vision, recent travel, and recent surgery. Patient follows Dr. Michoacano Guzmán. Location/Symptom: Left hand swelling  Timing/Onset: 4 weeks ago, gradual  Context/Setting:   Quality: Painless swelling  Duration: 4 weeks  Modifying Factors: NA  Severity: 1/10   REVIEW OF SYSTEMS     Review of Systems   Constitutional:  Negative for chills, fatigue, fever and unexpected weight change. HENT:  Negative for congestion, ear pain, rhinorrhea, sore throat, trouble swallowing and voice change. Eyes:  Negative for photophobia, pain, discharge and visual disturbance. Respiratory:  Negative for cough, chest tightness, shortness of breath and wheezing. Cardiovascular:  Negative for chest pain, palpitations and leg swelling. Gastrointestinal:  Negative for diarrhea, nausea and vomiting. Genitourinary:  Negative for dysuria and frequency. Musculoskeletal:  Positive for arthralgias and joint swelling. Negative for back pain, myalgias and neck pain. Skin:  Negative for color change, pallor, rash and wound. Neurological:  Negative for dizziness, syncope, weakness, light-headedness, numbness and headaches.    Hematological:  Negative for adenopathy. Does not bruise/bleed easily. All other systems reviewed and are negative. PAST MEDICAL HISTORY    has a past medical history of CAD (coronary artery disease), COVID-19, Enlarged lymph nodes, Heart disease, Hyperlipidemia, Hypertension, and Plasmacytoma ([facility] [address] SURGICAL HISTORY      has a past surgical history that includes Abscess Drainage; Colonoscopy; hernia repair (2016); Carotid stent placement (); CT BIOPSY BONE MARROW (2021); and bronchoscopy (N/A, 10/22/2021). CURRENT MEDICATIONS       Discharge Medication List as of 10/27/2022  1:40 PM        CONTINUE these medications which have NOT CHANGED    Details   esomeprazole Magnesium (NEXIUM) 20 MG PACK Take 20 mg by mouth dailyHistorical Med      pravastatin (PRAVACHOL) 80 MG tablet Take 1 tablet by mouth daily, Disp-90 tablet, R-3Normal      lisinopril (PRINIVIL;ZESTRIL) 20 MG tablet Take 1 tablet by mouth daily, Disp-90 tablet, R-3Normal      metoprolol succinate (TOPROL XL) 50 MG extended release tablet Take 1 tablet by mouth daily, Disp-90 tablet, R-3Normal      LORATADINE ALLERGY RELIEF PO Take by mouthHistorical Med      acetaminophen (TYLENOL) 325 MG tablet Take 650 mg by mouth every 6 hours as needed for PainHistorical Med      cetirizine (ZYRTEC) 10 MG tablet Take 10 mg by mouth dailyHistorical Med      Multiple Vitamins-Minerals (THERAPEUTIC MULTIVITAMIN-MINERALS) tablet Take 1 tablet by mouth dailyHistorical Med      aspirin 81 MG tablet Take 81 mg by mouth daily. ALLERGIES     is allergic to no known allergies. HISTORY     He indicated that his mother is . He indicated that his father is . He indicated that his brother is alive. family history includes Arthritis in his mother; Heart Disease in his father. SOCIALHISTORY      reports that he quit smoking about 21 years ago. His smoking use included cigarettes. He started smoking about 58 years ago.  He has a 37.00 pack-year smoking history. He has never used smokeless tobacco. He reports that he does not currently use alcohol. He reports that he does not use drugs. PHYSICAL EXAM     INITIAL VITALS:  temperature is 95.2 °F (35.1 °C) (abnormal). His blood pressure is 161/74 (abnormal) and his pulse is 72. His respiration is 17 and oxygen saturation is 98%. Physical Exam  Vitals and nursing note reviewed. Constitutional:       General: He is not in acute distress. Appearance: Normal appearance. He is well-developed. He is not ill-appearing, toxic-appearing or diaphoretic. Comments: Non Toxic   HENT:      Head: Normocephalic and atraumatic. Eyes:      Pupils: Pupils are equal, round, and reactive to light. Cardiovascular:      Rate and Rhythm: Normal rate. Pulmonary:      Effort: Pulmonary effort is normal.   Abdominal:      General: Bowel sounds are normal.      Palpations: Abdomen is soft. Musculoskeletal:         General: Swelling present. No signs of injury. Normal range of motion. Cervical back: Full passive range of motion without pain, normal range of motion and neck supple. Edema present. No rigidity. Right lower leg: No edema. Left lower leg: No edema. Comments: Generalized edema of Left hand   Lymphadenopathy:      Cervical: Cervical adenopathy present. Skin:     General: Skin is warm and dry. Capillary Refill: Capillary refill takes less than 2 seconds. Coloration: Skin is not pale. Findings: No bruising, erythema or rash. Neurological:      Mental Status: He is alert and oriented to person, place, and time. Sensory: No sensory deficit. Motor: No weakness.       Coordination: Coordination normal.   Psychiatric:         Mood and Affect: Mood normal.         Behavior: Behavior normal.       DIFFERENTIAL DIAGNOSIS:   DVT, Metastasis of plasmacytoma    DIAGNOSTIC RESULTS     EKG: All EKG's are interpreted by the Emergency Department Physician who either signs or Co-signs this chart in the absence of a cardiologist.      RADIOLOGY: non-plain film images(s) such as CT, Ultrasound and MRI are read by the radiologist.  CT SOFT TISSUE NECK W CONTRAST   Final Result   1. Stable CT soft tissue neck. 2. New small focal ground glass opacity right upper lobe representing focal atelectasis or groundglass infiltrate otherwise CT of the chest is stable. **This report has been created using voice recognition software. It may contain minor errors which are inherent in voice recognition technology. **      Final report electronically signed by Dr. Jamshid Robison on 10/27/2022 1:28 PM      CT CHEST W CONTRAST   Final Result   1. Stable CT soft tissue neck. 2. New small focal ground glass opacity right upper lobe representing focal atelectasis or groundglass infiltrate otherwise CT of the chest is stable. **This report has been created using voice recognition software. It may contain minor errors which are inherent in voice recognition technology. **      Final report electronically signed by Dr. Jamshid Robison on 10/27/2022 1:28 PM      VL DUP UPPER EXTREMITY VENOUS LEFT   Final Result   No evidence of a DVT. **This report has been created using voice recognition software. It may contain minor errors which are inherent in voice recognition technology. **      Final report electronically signed by Dr. Jamshid Robison on 10/27/2022 10:56 AM      XR HAND LEFT (MIN 3 VIEWS)   Final Result   Generalized soft tissue swelling. Single small erosion at the head of the third metacarpal which may represent focal infection. **This report has been created using voice recognition software. It may contain minor errors which are inherent in voice recognition technology. **      Final report electronically signed by Dr. Jamshid Robison on 10/27/2022 10:28 AM            LABS:   Labs Reviewed   CBC WITH AUTO DIFFERENTIAL - Abnormal; Notable for the following components: Result Value    RBC 3.84 (*)     Hemoglobin 11.2 (*)     Hematocrit 35.3 (*)     MCHC 31.7 (*)     RDW-SD 47.2 (*)     Immature Grans (Abs) 0.08 (*)     All other components within normal limits   BASIC METABOLIC PANEL - Abnormal; Notable for the following components:    BUN 25 (*)     Creatinine 1.3 (*)     All other components within normal limits   GLOMERULAR FILTRATION RATE, ESTIMATED - Abnormal; Notable for the following components:    Est, Glom Filt Rate 55 (*)     All other components within normal limits   ANION GAP   OSMOLALITY       EMERGENCY DEPARTMENT COURSE:   :    Vitals:    10/27/22 0932   BP: (!) 161/74   Pulse: 72   Resp: 17   Temp: (!) 95.2 °F (35.1 °C)   SpO2: 98%     Patient was seen history physical exam was performed. See disposition below    CRITICAL CARE:  None    CONSULTS:  None    PROCEDURES:  None    FINAL IMPRESSION      1. Left arm swelling    2.  Opacity of lung on imaging study          DISPOSITION/PLAN   Discharge    PATIENT REFERRED TO:  Kei Bergeron DO  [address] [place] [address]    In 2 days      DISCHARGE MEDICATIONS:  Discharge Medication List as of 10/27/2022  1:40 PM          (Please note that portions of this note were completed with a voice recognitionprogram.  Efforts were made to edit the dictations but occasionally words are mis-transcribed.)    KIM Orellana [place]  10/27/22 1731

## 2022-10-27 NOTE — ED PROVIDER NOTES
ATTENDING NOTE:    I supervised and discussed the history, physical exam and the management of this patient with the PA or NP. I reviewed the PA's or NP's note and agree with the documented findings and plan of care.       Electronically verified by MD Herman Whitt MD  10/27/22 2129

## 2022-10-27 NOTE — ED NOTES
Pt to ED via intake after being sent here from the [facility]. Pt reports they were going to the [facility] to get hearing aids and the nurse told him to come to the ER for the swelling in their hands which has been on going for 4 weeks. Pt VSS.      Maxx Kidd RN  10/27/22 1523

## 2022-11-21 ENCOUNTER — NURSE ONLY (OUTPATIENT)
Dept: LAB | Age: 82
End: 2022-11-21

## 2022-11-21 ENCOUNTER — HOSPITAL ENCOUNTER (OUTPATIENT)
Dept: GENERAL RADIOLOGY | Age: 82
Discharge: HOME | End: 2022-11-21
Payer: MEDICARE

## 2022-11-21 ENCOUNTER — OFFICE VISIT (OUTPATIENT)
Dept: RHEUMATOLOGY | Age: 82
End: 2022-11-21
Payer: MEDICARE

## 2022-11-21 ENCOUNTER — HOSPITAL ENCOUNTER (OUTPATIENT)
Age: 82
Discharge: HOME | End: 2022-11-21
Payer: MEDICARE

## 2022-11-21 VITALS
HEIGHT: 70 IN | OXYGEN SATURATION: 98 % | BODY MASS INDEX: 28.49 KG/M2 | HEART RATE: 73 BPM | WEIGHT: 199 LBS | DIASTOLIC BLOOD PRESSURE: 76 MMHG | SYSTOLIC BLOOD PRESSURE: 140 MMHG

## 2022-11-21 DIAGNOSIS — M25.50 MULTIPLE JOINT PAIN: ICD-10-CM

## 2022-11-21 DIAGNOSIS — M19.90 INFLAMMATORY ARTHRITIS: ICD-10-CM

## 2022-11-21 DIAGNOSIS — M19.90 INFLAMMATORY ARTHRITIS: Primary | ICD-10-CM

## 2022-11-21 LAB
ALBUMIN SERPL-MCNC: 4.5 G/DL (ref 3.5–5.1)
ALP BLD-CCNC: 90 U/L (ref 38–126)
ALT SERPL-CCNC: 9 U/L (ref 11–66)
ANION GAP SERPL CALCULATED.3IONS-SCNC: 14 MEQ/L (ref 8–16)
AST SERPL-CCNC: 15 U/L (ref 5–40)
BASOPHILS # BLD: 0.3 %
BASOPHILS ABSOLUTE: 0 THOU/MM3 (ref 0–0.1)
BILIRUB SERPL-MCNC: 0.4 MG/DL (ref 0.3–1.2)
BUN BLDV-MCNC: 22 MG/DL (ref 7–22)
C-REACTIVE PROTEIN: 3.14 MG/DL (ref 0–1)
CALCIUM SERPL-MCNC: 9.9 MG/DL (ref 8.5–10.5)
CHLORIDE BLD-SCNC: 104 MEQ/L (ref 98–111)
CO2: 22 MEQ/L (ref 23–33)
CREAT SERPL-MCNC: 1.3 MG/DL (ref 0.4–1.2)
EOSINOPHIL # BLD: 1.6 %
EOSINOPHILS ABSOLUTE: 0.1 THOU/MM3 (ref 0–0.4)
ERYTHROCYTE [DISTWIDTH] IN BLOOD BY AUTOMATED COUNT: 13.9 % (ref 11.5–14.5)
ERYTHROCYTE [DISTWIDTH] IN BLOOD BY AUTOMATED COUNT: 46.8 FL (ref 35–45)
GFR SERPL CREATININE-BSD FRML MDRD: 55 ML/MIN/1.73M2
GLUCOSE BLD-MCNC: 92 MG/DL (ref 70–108)
HCT VFR BLD CALC: 36.2 % (ref 42–52)
HEMOGLOBIN: 11.3 GM/DL (ref 14–18)
IMMATURE GRANS (ABS): 0.06 THOU/MM3 (ref 0–0.07)
IMMATURE GRANULOCYTES: 0.9 %
LYMPHOCYTES # BLD: 16.9 %
LYMPHOCYTES ABSOLUTE: 1.1 THOU/MM3 (ref 1–4.8)
MCH RBC QN AUTO: 28.8 PG (ref 26–33)
MCHC RBC AUTO-ENTMCNC: 31.2 GM/DL (ref 32.2–35.5)
MCV RBC AUTO: 92.3 FL (ref 80–94)
MONOCYTES # BLD: 9.6 %
MONOCYTES ABSOLUTE: 0.6 THOU/MM3 (ref 0.4–1.3)
NUCLEATED RED BLOOD CELLS: 0 /100 WBC
PLATELET # BLD: 271 THOU/MM3 (ref 130–400)
PMV BLD AUTO: 10.7 FL (ref 9.4–12.4)
POTASSIUM SERPL-SCNC: 4.9 MEQ/L (ref 3.5–5.2)
RBC # BLD: 3.92 MILL/MM3 (ref 4.7–6.1)
RHEUMATOID FACTOR: < 10 IU/ML (ref 0–13)
SEDIMENTATION RATE, ERYTHROCYTE: 50 MM/HR (ref 0–10)
SEG NEUTROPHILS: 70.7 %
SEGMENTED NEUTROPHILS ABSOLUTE COUNT: 4.5 THOU/MM3 (ref 1.8–7.7)
SODIUM BLD-SCNC: 140 MEQ/L (ref 135–145)
TOTAL PROTEIN: 7.4 G/DL (ref 6.1–8)
WBC # BLD: 6.3 THOU/MM3 (ref 4.8–10.8)

## 2022-11-21 PROCEDURE — 99204 OFFICE O/P NEW MOD 45 MIN: CPT | Performed by: INTERNAL MEDICINE

## 2022-11-21 PROCEDURE — G8427 DOCREV CUR MEDS BY ELIG CLIN: HCPCS | Performed by: INTERNAL MEDICINE

## 2022-11-21 PROCEDURE — G8484 FLU IMMUNIZE NO ADMIN: HCPCS | Performed by: INTERNAL MEDICINE

## 2022-11-21 PROCEDURE — 73130 X-RAY EXAM OF HAND: CPT

## 2022-11-21 PROCEDURE — G8417 CALC BMI ABV UP PARAM F/U: HCPCS | Performed by: INTERNAL MEDICINE

## 2022-11-21 PROCEDURE — 1036F TOBACCO NON-USER: CPT | Performed by: INTERNAL MEDICINE

## 2022-11-21 PROCEDURE — 1123F ACP DISCUSS/DSCN MKR DOCD: CPT | Performed by: INTERNAL MEDICINE

## 2022-11-21 RX ORDER — IBUPROFEN 200 MG
200 TABLET ORAL EVERY 6 HOURS PRN
COMMUNITY

## 2022-11-21 RX ORDER — PREDNISONE 20 MG/1
20 TABLET ORAL DAILY
Qty: 30 TABLET | Refills: 0 | Status: SHIPPED | OUTPATIENT
Start: 2022-11-21 | End: 2022-12-21

## 2022-11-21 ASSESSMENT — ENCOUNTER SYMPTOMS
SHORTNESS OF BREATH: 0
VOMITING: 0
ABDOMINAL PAIN: 0
NAUSEA: 0
COUGH: 0

## 2022-11-21 NOTE — PROGRESS NOTES
Patient Name: Nestor Santana  YOB: 1940  MRN: [id number]  Office visit date: 11/21/2022    Chief Complaint: New Patient (Pain in unspecified joint//Bilateral hands, wrist, elbows, knees RT hand worse )      Subjective/Objective:    HPI: Nestor Santana is a [de-identified] y.o. male who is a former smoker (quit 2001) with a PMHx of Multivessel CAD with stent x2 (2004), HTN, HLD, extramedullary plasmacytoma s/p radiation of left neck (2022) who presents for a new patient visit. He is here to establish care for joint pain and swelling. Reports about 1 year history of multiple joint pain including shoulders, hands, wrists and knees. He stated that he is now experiencing bilateral hand swelling that began after his influenza vaccine on 9/21/22. He went to ED and left hand X-ray demonstrated erosion of the left 3rd MCP head. He was discharged without new medications and referred back to his PCP. Labs at that time showed hyperuricemia (Uric acid=7.3).    (+) stiffness in the morning for a couple hours but not with complete resolution. Ibuprofen helps. Denies oral ulcers, Raynaud's symptoms, dry mouth, dry eyes, dysphagia, rashes or photosensitivity. He denies fever, chills, headache, lightheadedness, change in vision, rhinorrhea, congestion, sore throat, cough, hemoptysis, chest pain, palpitations, CASTRO, PND, shortness of breath, abdominal pain, nausea, vomiting, hematemesis, change in bowel/bladder habits, hematochezia, hematuria, weakness, difficulty with ambulation, numbness/tingling, or known exposure to sick contacts.       Past Medical History:   Diagnosis Date    CAD (coronary artery disease)     COVID-19 2022    Enlarged lymph nodes     Heart disease     Hyperlipidemia     Hypertension     Plasmacytoma ([facility] [address] 2021        Past Surgical History:   Procedure Laterality Date    ABSCESS DRAINAGE      SPIDER BITE    BRONCHOSCOPY N/A 10/22/2021    BRONCHOSCOPY W/EBUS FNA performed by Rashid Chowdhury MD at [address] [place] [address]  2004    x2    COLONOSCOPY      CT BONE MARROW BIOPSY  2021    CT BONE MARROW BIOPSY 2021 STR CT SCAN    HERNIA REPAIR  2016       Current Outpatient Medications   Medication Sig Dispense Refill    ibuprofen (ADVIL;MOTRIN) 200 MG tablet Take 200 mg by mouth every 6 hours as needed for Pain      predniSONE (DELTASONE) 20 MG tablet Take 1 tablet by mouth daily Take with food 30 tablet 0    esomeprazole Magnesium (NEXIUM) 20 MG PACK Take 20 mg by mouth daily      pravastatin (PRAVACHOL) 80 MG tablet Take 1 tablet by mouth daily 90 tablet 3    lisinopril (PRINIVIL;ZESTRIL) 20 MG tablet Take 1 tablet by mouth daily 90 tablet 3    metoprolol succinate (TOPROL XL) 50 MG extended release tablet Take 1 tablet by mouth daily 90 tablet 3    LORATADINE ALLERGY RELIEF PO Take by mouth      acetaminophen (TYLENOL) 325 MG tablet Take 650 mg by mouth every 6 hours as needed for Pain      cetirizine (ZYRTEC) 10 MG tablet Take 10 mg by mouth daily      Multiple Vitamins-Minerals (THERAPEUTIC MULTIVITAMIN-MINERALS) tablet Take 1 tablet by mouth daily      aspirin 81 MG tablet Take 81 mg by mouth daily. No current facility-administered medications for this visit.        Allergies   Allergen Reactions    No Known Allergies        Social History     Socioeconomic History    Marital status:      Spouse name: Bri Sotomayor    Number of children: 1    Years of education: Not on file    Highest education level: Not on file   Occupational History    Not on file   Tobacco Use    Smoking status: Former     Packs/day: 1.00     Years: 37.00     Pack years: 37.00     Types: Cigarettes     Start date: 1964     Quit date: 2001     Years since quittin.1    Smokeless tobacco: Never   Vaping Use    Vaping Use: Never used   Substance and Sexual Activity    Alcohol use: Not Currently     Comment: beer every so often     Drug use: Never    Sexual activity: Not on file Other Topics Concern    Not on file   Social History Narrative    Not on file     Social Determinants of Health     Financial Resource Strain: Not on file   Food Insecurity: Not on file   Transportation Needs: Not on file   Physical Activity: Not on file   Stress: Not on file   Social Connections: Not on file   Intimate Partner Violence: Not on file   Housing Stability: Not on file       Family History   Problem Relation Age of Onset    Arthritis Mother     Heart Disease Father        REVIEW OF SYSTEMS:  A 14-point ROS was obtained and negative, with the exception of pertinent positives as listed above in the HPI. Vitals:    11/21/22 0947   BP: (!) 140/76   Site: Left Upper Arm   Position: Sitting   Cuff Size: Medium Adult   Pulse: 73   SpO2: 98%   Weight: 199 lb (90.3 kg)   Height: 5' 10\" (1.778 m)       Wt Readings from Last 3 Encounters:   11/21/22 199 lb (90.3 kg)   10/12/22 199 lb (90.3 kg)   10/10/22 202 lb 9.6 oz (91.9 kg)       BP Readings from Last 3 Encounters:   11/21/22 (!) 140/76   10/27/22 (!) 161/74   10/12/22 (!) 142/84       Physical Exam  Vitals and nursing note reviewed. Constitutional:       General: He is awake. Appearance: Normal appearance. He is normal weight. HENT:      Head: Normocephalic and atraumatic. Right Ear: External ear normal.      Left Ear: External ear normal.      Nose: Nose normal.      Mouth/Throat:      Mouth: Mucous membranes are moist.      Pharynx: Oropharynx is clear. Eyes:      Conjunctiva/sclera: Conjunctivae normal.      Pupils: Pupils are equal, round, and reactive to light. Cardiovascular:      Rate and Rhythm: Normal rate and regular rhythm. Pulses: Normal pulses. Heart sounds: Normal heart sounds. Pulmonary:      Effort: Pulmonary effort is normal.      Breath sounds: Normal breath sounds. Abdominal:      General: Bowel sounds are normal.      Palpations: Abdomen is soft.    Musculoskeletal:         General: Normal range of motion. Cervical back: Normal range of motion and neck supple. Skin:     General: Skin is warm and dry. Capillary Refill: Capillary refill takes less than 2 seconds. Neurological:      General: No focal deficit present. Mental Status: He is alert and oriented to person, place, and time. Mental status is at baseline. GCS: GCS eye subscore is 4. GCS verbal subscore is 5. GCS motor subscore is 6. Psychiatric:         Attention and Perception: Attention and perception normal.         Mood and Affect: Mood and affect normal.         Speech: Speech normal.         Behavior: Behavior normal. Behavior is cooperative. Thought Content: Thought content normal.         Cognition and Memory: Cognition and memory normal.         Judgment: Judgment normal.       MSK:  Upper extremities:  Shoulders: nontender  Elbows: nontender, no swelling  Wrists: nontender, (+) swelling bilaterally  Hands/fingers: nontender, ()+) fullness bilaterally, (+) swelling bilaterally; MCP joints R>L    Lower Extremities:  Hips: nontender  Knees: nontender, no swelling, no warmth  Ankles: nontender, no swelling  Feet: nontender, no swelling, no bunion, no hammertoes, no fullness        Diagnostic data:  I have reviewed recent diagnostic testing including labs with the patient today. No results found for this visit on 11/21/22. Orders Placed This Encounter   Procedures    XR HAND RIGHT (MIN 3 VIEWS)    XR HAND LEFT (MIN 3 VIEWS)    CBC with Auto Differential    Comprehensive Metabolic Panel    Sedimentation Rate    C-Reactive Protein    Rheumatoid Factor    Cyclic Citrul Peptide Antibody, IgG      Requested Prescriptions     Signed Prescriptions Disp Refills    predniSONE (DELTASONE) 20 MG tablet 30 tablet 0     Sig: Take 1 tablet by mouth daily Take with food          Diagnosis Orders   1.  Inflammatory arthritis  CBC with Auto Differential    Comprehensive Metabolic Panel    Sedimentation Rate    C-Reactive Protein Rheumatoid Factor    Cyclic Citrul Peptide Antibody, IgG    predniSONE (DELTASONE) 20 MG tablet    XR HAND RIGHT (MIN 3 VIEWS)    XR HAND LEFT (MIN 3 VIEWS)      2. Multiple joint pain  CBC with Auto Differential    XR HAND RIGHT (MIN 3 VIEWS)    XR HAND LEFT (MIN 3 VIEWS)          Orders Placed This Encounter   Procedures    XR HAND RIGHT (MIN 3 VIEWS)     Standing Status:   Future     Standing Expiration Date:   11/21/2023    XR HAND LEFT (MIN 3 VIEWS)     Standing Status:   Future     Standing Expiration Date:   11/21/2023    CBC with Auto Differential     Standing Status:   Future     Standing Expiration Date:   5/21/2023    Comprehensive Metabolic Panel     Standing Status:   Future     Standing Expiration Date:   5/21/2023    Sedimentation Rate     Standing Status:   Future     Standing Expiration Date:   5/21/2023    C-Reactive Protein     Standing Status:   Future     Standing Expiration Date:   5/21/2023    Rheumatoid Factor     Standing Status:   Future     Standing Expiration Date:   6/97/0184    Cyclic Citrul Peptide Antibody, IgG     Standing Status:   Future     Standing Expiration Date:   5/21/2023       Assessment/Plan:  Acute bilateral hand swelling - ?RA vs tenosynovitis. Worsening over past 1.5 months. Inability to close hand and pain with straightening fingers. - Bilateral hand X-ray,  - CRP, ESR, RF, Anti-CCP, CBC. CMP  - Prednisone 20mg daily    Polyarticular joint pain - Shoulders, wrists, hands, and knees. Worsening over past 1.5 months  - Plan as above    Hyperuricemia - Found on outside lab review (9/12/2022). Denies s/s of gout/pseudogout. No evidence of either on examination.  - Continue to monitor for s/s of crystal arthropathy    Hx of extramedullary plasmacytoma - s/p radiation. Follows with Dr. Otilia Winter (Heme/Onc) outpatient. The patient is in agreement with and verbalizes understanding of the plan of care today and has no additional questions or complaints.  The patient was instructed to Return in about 2 weeks (around 12/5/2022). He was instructed to contact our office sooner if problems should arise. Laboratory studies will be completed prior to next visit. Case discussed with Dr. Casey Crystal    Electronically signed by: Nathalia White DO, JHONNY, PGY3 on 11/21/2022 at 10:51 AM    [address] [address]  Dept: [phone]  Dept Fax: [phone]: [phone]    (Please note that portions of this note were completed with a voice recognition program and electronically transcribed. Efforts were made to edit the dictations but occasionally words are mis-transcribed. This transcription may contain errors not detected in proofreading.  This transcription was electronically signed.)

## 2022-11-21 NOTE — PROGRESS NOTES
[address] [facility]   Rheumatology Clinic Note      11/21/2022       Reason for Consult:  swelling and stiffness in hands  Requesting Physician:  JENNIFER Cummings - *     CHIEF COMPLAINT:    Chief Complaint   Patient presents with    New Patient     Pain in unspecified joint    Bilateral hands, wrist, elbows, knees RT hand worse            HISTORY OF PRESENT ILLNESS:    [de-identified] y.o. male presents today for evaluation of pain and stiffness in hands. Presents for evaluation of intermittent joint pain. Pain in shoulders, wrists, elbows and fingers. Swelling in the hands. Started about 6 weeks ago, 3-days after getting flu vaccine. Was given \"a number of pills\" that did not help. Reports having stiffness all day. Is on ibuprofen eases the pain partially. Unaware of any aggravating factors. Following with oncology for extramedullary plasmacytoma. Completed radiation therapy this year. No psoriasis. No IBD-like symptoms. Past Medical History:     has a past medical history of CAD (coronary artery disease), COVID-19, Enlarged lymph nodes, Heart disease, Hyperlipidemia, Hypertension, and Plasmacytoma ([facility] [address] Past Surgical History:     has a past surgical history that includes Abscess Drainage; Colonoscopy; hernia repair (05/18/2016); Carotid stent placement (2004); CT BIOPSY BONE MARROW (09/29/2021); and bronchoscopy (N/A, 10/22/2021).     Current Medications:      Current Outpatient Medications:     ibuprofen (ADVIL;MOTRIN) 200 MG tablet, Take 200 mg by mouth every 6 hours as needed for Pain, Disp: , Rfl:     predniSONE (DELTASONE) 20 MG tablet, Take 1 tablet by mouth daily Take with food, Disp: 30 tablet, Rfl: 0    esomeprazole Magnesium (NEXIUM) 20 MG PACK, Take 20 mg by mouth daily, Disp: , Rfl:     pravastatin (PRAVACHOL) 80 MG tablet, Take 1 tablet by mouth daily, Disp: 90 tablet, Rfl: 3    lisinopril (PRINIVIL;ZESTRIL) 20 MG tablet, Take 1 tablet by mouth daily, Disp: 90 tablet, Rfl: 3 metoprolol succinate (TOPROL XL) 50 MG extended release tablet, Take 1 tablet by mouth daily, Disp: 90 tablet, Rfl: 3    LORATADINE ALLERGY RELIEF PO, Take by mouth, Disp: , Rfl:     acetaminophen (TYLENOL) 325 MG tablet, Take 650 mg by mouth every 6 hours as needed for Pain, Disp: , Rfl:     cetirizine (ZYRTEC) 10 MG tablet, Take 10 mg by mouth daily, Disp: , Rfl:     Multiple Vitamins-Minerals (THERAPEUTIC MULTIVITAMIN-MINERALS) tablet, Take 1 tablet by mouth daily, Disp: , Rfl:     aspirin 81 MG tablet, Take 81 mg by mouth daily. , Disp: , Rfl:     Allergies:    No known allergies    Social History:     reports that he quit smoking about 21 years ago. His smoking use included cigarettes. He started smoking about 58 years ago. He has a 37.00 pack-year smoking history. He has never used smokeless tobacco. He reports that he does not currently use alcohol. He reports that he does not use drugs. Family History:   family history includes Arthritis in his mother; Heart Disease in his father. REVIEW OF SYSTEMS:    Review of Systems   Constitutional:  Positive for fatigue. Negative for chills, fever and unexpected weight change. HENT:  Negative for mouth sores. Respiratory:  Negative for cough and shortness of breath. Cardiovascular:  Negative for chest pain and leg swelling. Gastrointestinal:  Negative for abdominal pain, nausea and vomiting. Skin:  Negative for rash. Neurological:  Negative for headaches. PHYSICAL EXAM:    Vitals:    BP (!) 140/76 (Site: Left Upper Arm, Position: Sitting, Cuff Size: Medium Adult)   Pulse 73   Ht 5' 10\" (1.778 m)   Wt 199 lb (90.3 kg)   SpO2 98%   BMI 28.55 kg/m²     Physical Exam  Constitutional:       General: He is not in acute distress. Appearance: Normal appearance. Eyes:      Conjunctiva/sclera: Conjunctivae normal.   Pulmonary:      Effort: Pulmonary effort is normal.   Musculoskeletal:         General: Swelling present.  No tenderness or deformity. Right lower leg: No edema. Left lower leg: No edema. Comments: Tenosynovitis in MCPs of both hands and fingers. No significant tenderness. Decreased hand  strength in both hands. Skin:     General: Skin is warm and dry. Findings: No rash. Neurological:      General: No focal deficit present. Mental Status: He is alert and oriented to person, place, and time. Gait: Gait normal.   Psychiatric:         Mood and Affect: Mood normal.          DATA:                    IMPRESSION/RECOMMENDATIONS:      1. Suspect inflammatory arthritis contributing to his symptoms. Polyarticular.    -- trial of prednisone 20 mg daily  -- labs today  -- RTC in 2 weeks        Orders Placed This Encounter   Procedures    XR HAND RIGHT (MIN 3 VIEWS)     Standing Status:   Future     Number of Occurrences:   1     Standing Expiration Date:   11/21/2023    XR HAND LEFT (MIN 3 VIEWS)     Standing Status:   Future     Number of Occurrences:   1     Standing Expiration Date:   11/21/2023    CBC with Auto Differential     Standing Status:   Future     Number of Occurrences:   1     Standing Expiration Date:   5/21/2023    Comprehensive Metabolic Panel     Standing Status:   Future     Number of Occurrences:   1     Standing Expiration Date:   5/21/2023    Sedimentation Rate     Standing Status:   Future     Number of Occurrences:   1     Standing Expiration Date:   5/21/2023    C-Reactive Protein     Standing Status:   Future     Number of Occurrences:   1     Standing Expiration Date:   5/21/2023    Rheumatoid Factor     Standing Status:   Future     Number of Occurrences:   1     Standing Expiration Date:   3/91/0575    Cyclic Citrul Peptide Antibody, IgG     Standing Status:   Future     Number of Occurrences:   1     Standing Expiration Date:   5/21/2023        Reddy Marx MD    [address] [facility]  [address] [facility]. [address]  [phone]

## 2022-11-24 LAB — CYCLIC CITRULLINATED PEPTIDE ANTIBODY IGG: 1.4 U/ML (ref 0–7)

## 2022-12-05 ENCOUNTER — OFFICE VISIT (OUTPATIENT)
Dept: RHEUMATOLOGY | Age: 82
End: 2022-12-05
Payer: MEDICARE

## 2022-12-05 VITALS
BODY MASS INDEX: 28.8 KG/M2 | DIASTOLIC BLOOD PRESSURE: 64 MMHG | HEIGHT: 70 IN | HEART RATE: 67 BPM | OXYGEN SATURATION: 95 % | SYSTOLIC BLOOD PRESSURE: 132 MMHG | WEIGHT: 201.2 LBS

## 2022-12-05 DIAGNOSIS — M19.90 INFLAMMATORY ARTHRITIS: Primary | ICD-10-CM

## 2022-12-05 DIAGNOSIS — Z79.899 ENCOUNTER FOR LONG-TERM (CURRENT) USE OF HIGH-RISK MEDICATION: ICD-10-CM

## 2022-12-05 PROCEDURE — 1123F ACP DISCUSS/DSCN MKR DOCD: CPT | Performed by: INTERNAL MEDICINE

## 2022-12-05 PROCEDURE — G8417 CALC BMI ABV UP PARAM F/U: HCPCS | Performed by: INTERNAL MEDICINE

## 2022-12-05 PROCEDURE — 1036F TOBACCO NON-USER: CPT | Performed by: INTERNAL MEDICINE

## 2022-12-05 PROCEDURE — G8427 DOCREV CUR MEDS BY ELIG CLIN: HCPCS | Performed by: INTERNAL MEDICINE

## 2022-12-05 PROCEDURE — 99214 OFFICE O/P EST MOD 30 MIN: CPT | Performed by: INTERNAL MEDICINE

## 2022-12-05 PROCEDURE — G8484 FLU IMMUNIZE NO ADMIN: HCPCS | Performed by: INTERNAL MEDICINE

## 2022-12-05 RX ORDER — FOLIC ACID 1 MG/1
1 TABLET ORAL DAILY
Qty: 30 TABLET | Refills: 5 | Status: SHIPPED | OUTPATIENT
Start: 2022-12-05 | End: 2023-01-04

## 2022-12-05 RX ORDER — PREDNISONE 1 MG/1
TABLET ORAL
Qty: 42 TABLET | Refills: 0 | Status: SHIPPED | OUTPATIENT
Start: 2022-12-05 | End: 2022-12-26

## 2022-12-05 ASSESSMENT — ENCOUNTER SYMPTOMS
COUGH: 0
NAUSEA: 0
SHORTNESS OF BREATH: 0
ABDOMINAL PAIN: 0
VOMITING: 0

## 2022-12-05 NOTE — PROGRESS NOTES
[address] [facility]   Rheumatology Clinic Note      12/5/2022         CHIEF COMPLAINT:    Chief Complaint   Patient presents with    Follow-up     2 week            HISTORY OF PRESENT ILLNESS:    [de-identified] y.o. male presents for follow up. In last office visit, labs were ordered and prednsione 20 mg daily was prescribed for suspected inflammatory arthritis. Reports significant improvement in his joint pain and stiffness after starting prednisone. Has mild achiness in his left index finger, 1/10 in pain scale. RECAP:  Presents for evaluation of intermittent joint pain. Pain in shoulders, wrists, elbows and fingers. Swelling in the hands. Started about 6 weeks ago, 3-days after getting flu vaccine. Was given \"a number of pills\" that did not help. Reports having stiffness all day. Following with oncology for extramedullary plasmacytoma. Completed radiation therapy this year. No psoriasis. No IBD-like symptoms. Past Medical History:     has a past medical history of CAD (coronary artery disease), COVID-19, Enlarged lymph nodes, Heart disease, Hyperlipidemia, Hypertension, and Plasmacytoma ([facility] [address] Past Surgical History:     has a past surgical history that includes Abscess Drainage; Colonoscopy; hernia repair (05/18/2016); Carotid stent placement (2004); CT BIOPSY BONE MARROW (09/29/2021); and bronchoscopy (N/A, 10/22/2021).     Current Medications:      Current Outpatient Medications:     ibuprofen (ADVIL;MOTRIN) 200 MG tablet, Take 200 mg by mouth every 6 hours as needed for Pain, Disp: , Rfl:     predniSONE (DELTASONE) 20 MG tablet, Take 1 tablet by mouth daily Take with food, Disp: 30 tablet, Rfl: 0    esomeprazole Magnesium (NEXIUM) 20 MG PACK, Take 20 mg by mouth daily, Disp: , Rfl:     pravastatin (PRAVACHOL) 80 MG tablet, Take 1 tablet by mouth daily, Disp: 90 tablet, Rfl: 3    lisinopril (PRINIVIL;ZESTRIL) 20 MG tablet, Take 1 tablet by mouth daily, Disp: 90 tablet, Rfl: 3    metoprolol succinate (TOPROL XL) 50 MG extended release tablet, Take 1 tablet by mouth daily, Disp: 90 tablet, Rfl: 3    LORATADINE ALLERGY RELIEF PO, Take by mouth, Disp: , Rfl:     acetaminophen (TYLENOL) 325 MG tablet, Take 650 mg by mouth every 6 hours as needed for Pain, Disp: , Rfl:     cetirizine (ZYRTEC) 10 MG tablet, Take 10 mg by mouth daily, Disp: , Rfl:     Multiple Vitamins-Minerals (THERAPEUTIC MULTIVITAMIN-MINERALS) tablet, Take 1 tablet by mouth daily, Disp: , Rfl:     aspirin 81 MG tablet, Take 81 mg by mouth daily. , Disp: , Rfl:     Allergies:    No known allergies    Social History:     reports that he quit smoking about 21 years ago. His smoking use included cigarettes. He started smoking about 58 years ago. He has a 37.00 pack-year smoking history. He has never used smokeless tobacco. He reports that he does not currently use alcohol. He reports that he does not use drugs. Family History:   family history includes Arthritis in his mother; Heart Disease in his father. REVIEW OF SYSTEMS:    Review of Systems   Constitutional:  Positive for fatigue. Negative for chills, fever and unexpected weight change. HENT:  Negative for mouth sores. Respiratory:  Negative for cough and shortness of breath. Cardiovascular:  Negative for chest pain and leg swelling. Gastrointestinal:  Negative for abdominal pain, nausea and vomiting. Skin:  Negative for rash. Neurological:  Negative for headaches. PHYSICAL EXAM:    Vitals:    /64 (Site: Left Upper Arm, Position: Sitting, Cuff Size: Medium Adult)   Pulse 67   Ht 5' 10\" (1.778 m)   Wt 201 lb 3.2 oz (91.3 kg)   SpO2 95%   BMI 28.87 kg/m²     Physical Exam  Constitutional:       General: He is not in acute distress. Appearance: Normal appearance. Eyes:      Conjunctiva/sclera: Conjunctivae normal.   Pulmonary:      Effort: Pulmonary effort is normal.   Musculoskeletal:         General: No swelling or tenderness.       Comments: Tenosynovitis in MCPs of both hands and fingers. No significant tenderness. Decreased hand  strength in both hands. Skin:     General: Skin is warm and dry. Findings: No rash. Neurological:      General: No focal deficit present. Mental Status: He is alert and oriented to person, place, and time. Gait: Gait normal.   Psychiatric:         Mood and Affect: Mood normal.          DATA:     Latest Reference Range & Units 11/21/22 11:06   Sodium 135 - 145 meq/L 140   Potassium 3.5 - 5.2 meq/L 4.9   Chloride 98 - 111 meq/L 104   CO2 23 - 33 meq/L 22 (L)   BUN,BUNPL 7 - 22 mg/dL 22   Creatinine 0.4 - 1.2 mg/dL 1.3 (H)   Anion Gap 8.0 - 16.0 meq/L 14.0   Est, Glom Filt Rate >60 ml/min/1.73m2 55 ! Glucose, Random 70 - 108 mg/dL 92   CALCIUM, SERUM 8.5 - 10.5 mg/dL 9.9   Total Protein 6.1 - 8.0 g/dL 7.4      Latest Reference Range & Units 11/21/22 11:06   CRP 0.00 - 1.00 mg/dl 3.14 (H)      Latest Reference Range & Units 11/21/22 11:06   Albumin 3.5 - 5.1 g/dL 4.5   Alk Phos 38 - 126 U/L 90   ALT 11 - 66 U/L 9 (L)   AST 5 - 40 U/L 15   Bilirubin 0.3 - 1.2 mg/dL 0.4   Total Protein 6.1 - 8.0 g/dL 7.4      Latest Reference Range & Units 11/21/22 11:06   WBC 4.8 - 10.8 thou/mm3 6.3   RBC 4.70 - 6.10 mill/mm3 3.92 (L)   Hemoglobin Quant 14.0 - 18.0 gm/dl 11.3 (L)   Hematocrit 42.0 - 52.0 % 36.2 (L)   MCV 80.0 - 94.0 fL 92.3   MCH 26.0 - 33.0 pg 28.8   MCHC 32.2 - 35.5 gm/dl 31.2 (L)   MPV 9.4 - 12.4 fL 10.7   RDW-CV 11.5 - 14.5 % 13.9   RDW-SD 35.0 - 45.0 fL 46.8 (H)   Platelet Count 839 - 400 thou/mm3 271      Latest Reference Range & Units 11/21/22 11:06   Sed Rate 0 - 10 mm/hr 50 (H)      Latest Reference Range & Units 11/21/22 11:06   Rheumatoid Factor 0 - 13 IU/mL < 10   CC Peptide,IgG Ab 0.0 - 7.0 U/mL 1.4       XRAY Left Hand 11/21/2022:  1. Degenerative change involving the interphalangeal and to lesser extent metacarpal phalangeal joints.    2. Small erosion in the head of the third metacarpal, unchanged. 3. There is no fracture or other bony abnormality noted. IMPRESSION/RECOMMENDATIONS:      1. Suspect inflammatory arthritis contributing to his symptoms. Clinically, improved significant with oral prednisone. He is travelling to [place] mid Dec and will be back in April. -- Discussed with pt the likely diagnosis of seronegative rheumatoid arthriits, natural history of this condition and outlined treatment plan. Patient expressed understanding.  -- trial of methotrexate 10 mg PO weekly and folic acid 1 mg daily. Discussed with pt the benefits, risks and adverse effects of this medication. Patient expressed understanding.  -- prednisone taper as follows: pred 20 mg daily x 2 weeks, then 15 mg daily x 1 week, then 10 mg daily x 1 week, then 5 mg daily x 1 week then stop    2. High risk med requiring close monitoring  -- MTX: monitor CBC and CMP in 6 weeks (pt to do labs in [place]) and again in 4 months    RTC in 4 months        No orders of the defined types were placed in this encounter.        Aniya Parker MD    [address] [facility]  [address] [facility]. [address] [facility]  [address] [place] [address]  [phone]

## 2023-01-10 ENCOUNTER — NURSE ONLY (OUTPATIENT)
Dept: LAB | Age: 83
End: 2023-01-10

## 2023-01-10 DIAGNOSIS — M19.90 INFLAMMATORY ARTHRITIS: ICD-10-CM

## 2023-01-10 DIAGNOSIS — Z79.899 ENCOUNTER FOR LONG-TERM (CURRENT) USE OF HIGH-RISK MEDICATION: ICD-10-CM

## 2023-01-10 LAB
ALBUMIN SERPL-MCNC: 3.8 G/DL (ref 3.5–5.1)
ALP BLD-CCNC: 76 U/L (ref 38–126)
ALT SERPL-CCNC: 12 U/L (ref 11–66)
ANION GAP SERPL CALCULATED.3IONS-SCNC: 13 MEQ/L (ref 8–16)
AST SERPL-CCNC: 15 U/L (ref 5–40)
BASOPHILS # BLD: 0.3 %
BASOPHILS ABSOLUTE: 0 THOU/MM3 (ref 0–0.1)
BILIRUB SERPL-MCNC: 0.5 MG/DL (ref 0.3–1.2)
BUN BLDV-MCNC: 20 MG/DL (ref 7–22)
C-REACTIVE PROTEIN: 4.8 MG/DL (ref 0–1)
CALCIUM SERPL-MCNC: 9.1 MG/DL (ref 8.5–10.5)
CHLORIDE BLD-SCNC: 103 MEQ/L (ref 98–111)
CO2: 26 MEQ/L (ref 23–33)
CREAT SERPL-MCNC: 1.5 MG/DL (ref 0.4–1.2)
EOSINOPHIL # BLD: 1.5 %
EOSINOPHILS ABSOLUTE: 0.1 THOU/MM3 (ref 0–0.4)
ERYTHROCYTE [DISTWIDTH] IN BLOOD BY AUTOMATED COUNT: 16.3 % (ref 11.5–14.5)
ERYTHROCYTE [DISTWIDTH] IN BLOOD BY AUTOMATED COUNT: 57.1 FL (ref 35–45)
GFR SERPL CREATININE-BSD FRML MDRD: 46 ML/MIN/1.73M2
GLUCOSE BLD-MCNC: 98 MG/DL (ref 70–108)
HCT VFR BLD CALC: 35.7 % (ref 42–52)
HEMOGLOBIN: 11.3 GM/DL (ref 14–18)
IMMATURE GRANS (ABS): 0.05 THOU/MM3 (ref 0–0.07)
IMMATURE GRANULOCYTES: 0.8 %
LYMPHOCYTES # BLD: 15.9 %
LYMPHOCYTES ABSOLUTE: 0.9 THOU/MM3 (ref 1–4.8)
MCH RBC QN AUTO: 30.5 PG (ref 26–33)
MCHC RBC AUTO-ENTMCNC: 31.7 GM/DL (ref 32.2–35.5)
MCV RBC AUTO: 96.2 FL (ref 80–94)
MONOCYTES # BLD: 9 %
MONOCYTES ABSOLUTE: 0.5 THOU/MM3 (ref 0.4–1.3)
NUCLEATED RED BLOOD CELLS: 0 /100 WBC
PLATELET # BLD: 236 THOU/MM3 (ref 130–400)
PMV BLD AUTO: 10.8 FL (ref 9.4–12.4)
POTASSIUM SERPL-SCNC: 4.6 MEQ/L (ref 3.5–5.2)
RBC # BLD: 3.71 MILL/MM3 (ref 4.7–6.1)
SEDIMENTATION RATE, ERYTHROCYTE: 55 MM/HR (ref 0–10)
SEG NEUTROPHILS: 72.5 %
SEGMENTED NEUTROPHILS ABSOLUTE COUNT: 4.3 THOU/MM3 (ref 1.8–7.7)
SODIUM BLD-SCNC: 142 MEQ/L (ref 135–145)
TOTAL PROTEIN: 6.3 G/DL (ref 6.1–8)
WBC # BLD: 5.9 THOU/MM3 (ref 4.8–10.8)

## 2023-01-16 ENCOUNTER — OFFICE VISIT (OUTPATIENT)
Dept: RHEUMATOLOGY | Age: 83
End: 2023-01-16
Payer: MEDICARE

## 2023-01-16 VITALS
DIASTOLIC BLOOD PRESSURE: 72 MMHG | HEART RATE: 82 BPM | BODY MASS INDEX: 28.77 KG/M2 | SYSTOLIC BLOOD PRESSURE: 130 MMHG | WEIGHT: 201 LBS | OXYGEN SATURATION: 97 % | HEIGHT: 70 IN

## 2023-01-16 DIAGNOSIS — Z79.899 ENCOUNTER FOR LONG-TERM (CURRENT) USE OF HIGH-RISK MEDICATION: ICD-10-CM

## 2023-01-16 DIAGNOSIS — M19.90 INFLAMMATORY ARTHRITIS: Primary | ICD-10-CM

## 2023-01-16 PROCEDURE — G8484 FLU IMMUNIZE NO ADMIN: HCPCS | Performed by: INTERNAL MEDICINE

## 2023-01-16 PROCEDURE — G8427 DOCREV CUR MEDS BY ELIG CLIN: HCPCS | Performed by: INTERNAL MEDICINE

## 2023-01-16 PROCEDURE — 1123F ACP DISCUSS/DSCN MKR DOCD: CPT | Performed by: INTERNAL MEDICINE

## 2023-01-16 PROCEDURE — 99214 OFFICE O/P EST MOD 30 MIN: CPT | Performed by: INTERNAL MEDICINE

## 2023-01-16 PROCEDURE — G8417 CALC BMI ABV UP PARAM F/U: HCPCS | Performed by: INTERNAL MEDICINE

## 2023-01-16 PROCEDURE — 1036F TOBACCO NON-USER: CPT | Performed by: INTERNAL MEDICINE

## 2023-01-16 RX ORDER — LEFLUNOMIDE 20 MG/1
20 TABLET ORAL DAILY
Qty: 30 TABLET | Refills: 1 | Status: SHIPPED | OUTPATIENT
Start: 2023-01-16 | End: 2023-02-15

## 2023-01-16 RX ORDER — PREDNISONE 1 MG/1
5 TABLET ORAL DAILY
Qty: 30 TABLET | Refills: 5 | Status: SHIPPED | OUTPATIENT
Start: 2023-01-16 | End: 2023-02-15

## 2023-01-16 ASSESSMENT — ENCOUNTER SYMPTOMS
NAUSEA: 0
COUGH: 0
CHEST TIGHTNESS: 0
ABDOMINAL PAIN: 0
VOMITING: 0
DIARRHEA: 0
SHORTNESS OF BREATH: 0
SORE THROAT: 0

## 2023-01-16 NOTE — PROGRESS NOTES
[facility]   Rheumatology Clinic Note      1/16/2023         CHIEF COMPLAINT:    Chief Complaint   Patient presents with    Follow-up     Bilateral knee pain, bilateral shoulder pain. HISTORY OF PRESENT ILLNESS:    82 y.o. male with suspected seronegative rheumatoid arthritis presents for follow up. In last office, methotrexate 10 mg PO once weekly and folic acid 1 mg daily were prescribed. Prednsione taper regimen was prescribed as well. Has not noticed much improvement in his joint pain and swelling. Reports having SOB and vision changes with methotrexate. Also has headaches that he attributes to MTX. Did well with prednsione. However, symptoms recurred once prednisone was tapered off. Has prolonged morning stiffness that lasts for hours. Pain in his hands, shoulders and knees. Episodes of swelling in his hands. Achy non-radiating in nature. Improved with prednisone. Unaware of any aggravating factors. RECAP:  Presents for evaluation of intermittent joint pain. Pain in shoulders, wrists, elbows and fingers. Swelling in the hands. Started about 6 weeks ago, 3-days after getting flu vaccine. Was given \"a number of pills\" that did not help. Reports having stiffness all day. Following with oncology for extramedullary plasmacytoma. Completed radiation therapy this year. No psoriasis. No IBD-like symptoms. Past Medical History:     has a past medical history of CAD (coronary artery disease), COVID-19, Enlarged lymph nodes, Heart disease, Hyperlipidemia, Hypertension, and Plasmacytoma ([facility] [address] Past Surgical History:     has a past surgical history that includes Abscess Drainage; Colonoscopy; hernia repair (05/18/2016); Carotid stent placement (2004); CT BIOPSY BONE MARROW (09/29/2021); and bronchoscopy (N/A, 10/22/2021).     Current Medications:      Current Outpatient Medications:     predniSONE (DELTASONE) 5 MG tablet, Take 1 tablet by mouth daily, Disp: 30 tablet, Rfl: 5    leflunomide (ARAVA) 20 MG tablet, Take 1 tablet by mouth daily, Disp: 30 tablet, Rfl: 1    ibuprofen (ADVIL;MOTRIN) 200 MG tablet, Take 200 mg by mouth every 6 hours as needed for Pain, Disp: , Rfl:     esomeprazole Magnesium (NEXIUM) 20 MG PACK, Take 20 mg by mouth daily, Disp: , Rfl:     pravastatin (PRAVACHOL) 80 MG tablet, Take 1 tablet by mouth daily, Disp: 90 tablet, Rfl: 3    lisinopril (PRINIVIL;ZESTRIL) 20 MG tablet, Take 1 tablet by mouth daily, Disp: 90 tablet, Rfl: 3    metoprolol succinate (TOPROL XL) 50 MG extended release tablet, Take 1 tablet by mouth daily, Disp: 90 tablet, Rfl: 3    LORATADINE ALLERGY RELIEF PO, Take by mouth, Disp: , Rfl:     acetaminophen (TYLENOL) 325 MG tablet, Take 650 mg by mouth every 6 hours as needed for Pain, Disp: , Rfl:     cetirizine (ZYRTEC) 10 MG tablet, Take 10 mg by mouth daily, Disp: , Rfl:     Multiple Vitamins-Minerals (THERAPEUTIC MULTIVITAMIN-MINERALS) tablet, Take 1 tablet by mouth daily, Disp: , Rfl:     aspirin 81 MG tablet, Take 81 mg by mouth daily. , Disp: , Rfl:     Allergies:    No known allergies    Social History:     reports that he quit smoking about 21 years ago. His smoking use included cigarettes. He started smoking about 58 years ago. He has a 37.00 pack-year smoking history. He has never used smokeless tobacco. He reports that he does not currently use alcohol. He reports that he does not use drugs. Family History:   family history includes Arthritis in his mother; Heart Disease in his father. REVIEW OF SYSTEMS:    Review of Systems   Constitutional:  Positive for fatigue. Negative for chills, fever and unexpected weight change. HENT:  Negative for mouth sores. Respiratory:  Negative for cough and shortness of breath. Cardiovascular:  Negative for chest pain and leg swelling. Gastrointestinal:  Negative for abdominal pain, nausea and vomiting. Skin:  Negative for rash.    Neurological:  Negative for headaches. PHYSICAL EXAM:    Vitals:    /72 (Site: Left Upper Arm, Position: Sitting, Cuff Size: Medium Adult)   Pulse 82   Ht 5' 10\" (1.778 m)   Wt 201 lb (91.2 kg)   SpO2 97%   BMI 28.84 kg/m²     Physical Exam  Constitutional:       General: He is not in acute distress. Appearance: Normal appearance. Eyes:      Conjunctiva/sclera: Conjunctivae normal.   Pulmonary:      Effort: Pulmonary effort is normal.   Musculoskeletal:         General: Tenderness (tenderness in PIPs) present. No swelling (no significant swelling in peripheral joints). Skin:     General: Skin is warm and dry. Findings: No rash. Neurological:      General: No focal deficit present. Mental Status: He is alert and oriented to person, place, and time. Gait: Gait normal.   Psychiatric:         Mood and Affect: Mood normal.          DATA:     Latest Reference Range & Units 1/10/23 11:17   Sodium 135 - 145 meq/L 142   Potassium 3.5 - 5.2 meq/L 4.6   Chloride 98 - 111 meq/L 103   CO2 23 - 33 meq/L 26   BUN,BUNPL 7 - 22 mg/dL 20   Creatinine 0.4 - 1.2 mg/dL 1.5 (H)   Anion Gap 8.0 - 16.0 meq/L 13.0   Est, Glom Filt Rate >60 ml/min/1.73m2 46 !    Glucose, Random 70 - 108 mg/dL 98   CALCIUM, SERUM, [id number] 8.5 - 10.5 mg/dL 9.1   Total Protein 6.1 - 8.0 g/dL 6.3      Latest Reference Range & Units 1/10/23 11:17   CRP 0.00 - 1.00 mg/dl 4.80 (H)      Latest Reference Range & Units 1/10/23 11:17   Albumin 3.5 - 5.1 g/dL 3.8   Alk Phos 38 - 126 U/L 76   ALT 11 - 66 U/L 12   AST 5 - 40 U/L 15   Bilirubin 0.3 - 1.2 mg/dL 0.5   Total Protein 6.1 - 8.0 g/dL 6.3      Latest Reference Range & Units 1/10/23 11:18   WBC 4.8 - 10.8 thou/mm3 5.9   RBC 4.70 - 6.10 mill/mm3 3.71 (L)   Hemoglobin Quant 14.0 - 18.0 gm/dl 11.3 (L)   Hematocrit 42.0 - 52.0 % 35.7 (L)   MCV 80.0 - 94.0 fL 96.2 (H)   MCH 26.0 - 33.0 pg 30.5   MCHC 32.2 - 35.5 gm/dl 31.7 (L)   MPV 9.4 - 12.4 fL 10.8   RDW-CV 11.5 - 14.5 % 16.3 (H)   RDW-SD 35.0 - 45.0 fL 57.1 (H)   Platelet Count 078 - 400 thou/mm3 236      Latest Reference Range & Units 1/10/23 11:17   Sed Rate 0 - 10 mm/hr 55 (H)         XRAY Left Hand 11/21/2022:  1. Degenerative change involving the interphalangeal and to lesser extent metacarpal phalangeal joints. 2. Small erosion in the head of the third metacarpal, unchanged. 3. There is no fracture or other bony abnormality noted. IMPRESSION/RECOMMENDATIONS:      1. Suspect inflammatory arthritis contributing to his symptoms. Noted presence of an erosion on xray of hand. Continues to have active disease  -- d/c methotrexate due to side effects and ineffectiveness  -- trial of leflunomide 20 mg daily. Discussed with pt the benefits, risks and adverse effects of this medication. Patient expressed understanding.  -- trial of prednisone 5 mg daily.     2. High risk medication requiring close monitoring  -- LEF: monitor CBC and CMP every 4-6 weeks      RTC in 4 months      Orders Placed This Encounter   Procedures    CBC with Auto Differential     Standing Status:   Future     Standing Expiration Date:   7/16/2023    Comprehensive Metabolic Panel     Standing Status:   Future     Standing Expiration Date:   1/16/2024    C-Reactive Protein     Standing Status:   Future     Standing Expiration Date:   1/16/2024    Sedimentation Rate     Standing Status:   Future     Standing Expiration Date:   1/16/2024          Juana Elkins MD    [address] [place] [address]  [phone]

## 2023-01-16 NOTE — PROGRESS NOTES
[address] [place] [address]   Rheumatology Clinic Note      1/16/2023       CHIEF COMPLAINT:    Chief Complaint   Patient presents with    Follow-up     Bilateral knee pain, bilateral shoulder pain. HISTORY OF PRESENT ILLNESS:    82 y.o. male presents for follow up. Complains of bilateral shoulder pain and knee pain. Feels the Methotrexate is not working. Has also had side effects including feeling SOB and having some head fogginess and visual changes. States prednisone worked well for him previously. Complains of swelling in bilateral hands, worse in mornings and takes 1-2 hours before the stiffness improves. 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 Past Surgical History:     has a past surgical history that includes Abscess Drainage; Colonoscopy; hernia repair (05/18/2016); Carotid stent placement (2004); CT BIOPSY BONE MARROW (09/29/2021); and bronchoscopy (N/A, 10/22/2021).     Current Medications:      Current Outpatient Medications:     predniSONE (DELTASONE) 5 MG tablet, Take 1 tablet by mouth daily, Disp: 30 tablet, Rfl: 5    leflunomide (ARAVA) 20 MG tablet, Take 1 tablet by mouth daily, Disp: 30 tablet, Rfl: 1    ibuprofen (ADVIL;MOTRIN) 200 MG tablet, Take 200 mg by mouth every 6 hours as needed for Pain, Disp: , Rfl:     esomeprazole Magnesium (NEXIUM) 20 MG PACK, Take 20 mg by mouth daily, Disp: , Rfl:     pravastatin (PRAVACHOL) 80 MG tablet, Take 1 tablet by mouth daily, Disp: 90 tablet, Rfl: 3    lisinopril (PRINIVIL;ZESTRIL) 20 MG tablet, Take 1 tablet by mouth daily, Disp: 90 tablet, Rfl: 3    metoprolol succinate (TOPROL XL) 50 MG extended release tablet, Take 1 tablet by mouth daily, Disp: 90 tablet, Rfl: 3    LORATADINE ALLERGY RELIEF PO, Take by mouth, Disp: , Rfl:     acetaminophen (TYLENOL) 325 MG tablet, Take 650 mg by mouth every 6 hours as needed for Pain, Disp: , Rfl:     cetirizine (ZYRTEC) 10 MG tablet, Take 10 mg by mouth daily, Disp: , Rfl:     Multiple Vitamins-Minerals (THERAPEUTIC MULTIVITAMIN-MINERALS) tablet, Take 1 tablet by mouth daily, Disp: , Rfl:     aspirin 81 MG tablet, Take 81 mg by mouth daily. , Disp: , Rfl:     Allergies:    No known allergies    Social History:     reports that he quit smoking about 21 years ago. His smoking use included cigarettes. He started smoking about 58 years ago. He has a 37.00 pack-year smoking history. He has never used smokeless tobacco. He reports that he does not currently use alcohol. He reports that he does not use drugs. Family History:   family history includes Arthritis in his mother; Heart Disease in his father. REVIEW OF SYSTEMS:    Review of Systems   Constitutional:  Negative for fatigue and fever. HENT:  Negative for sore throat. Respiratory:  Negative for cough, chest tightness and shortness of breath. Cardiovascular:  Negative for chest pain and palpitations. Gastrointestinal:  Negative for diarrhea, nausea and vomiting. Endocrine: Negative. Genitourinary:  Negative for dysuria and flank pain. Musculoskeletal:  Positive for arthralgias, joint swelling and myalgias. Neurological:  Positive for headaches. Negative for dizziness and light-headedness. Psychiatric/Behavioral: Negative. PHYSICAL EXAM:    Vitals:    /72 (Site: Left Upper Arm, Position: Sitting, Cuff Size: Medium Adult)   Pulse 82   Ht 5' 10\" (1.778 m)   Wt 201 lb (91.2 kg)   SpO2 97%   BMI 28.84 kg/m²     Physical Exam  Constitutional:       Appearance: Normal appearance. HENT:      Head: Normocephalic and atraumatic. Nose: Nose normal.   Eyes:      Extraocular Movements: Extraocular movements intact. Conjunctiva/sclera: Conjunctivae normal.      Pupils: Pupils are equal, round, and reactive to light.    Cardiovascular:      Rate and Rhythm: Normal rate and regular rhythm. Heart sounds: Normal heart sounds. No murmur heard. No gallop. Pulmonary:      Effort: Pulmonary effort is normal. No respiratory distress. Breath sounds: Normal breath sounds. No stridor. No wheezing, rhonchi or rales. Abdominal:      General: Bowel sounds are normal. There is no distension. Palpations: Abdomen is soft. Tenderness: There is no abdominal tenderness. There is no guarding. Musculoskeletal:         General: Swelling (Slight synovitis bilaterally in MCP and DIPs in hands with no tenderness.) present. Normal range of motion. Cervical back: Normal range of motion and neck supple. Neurological:      General: No focal deficit present. Mental Status: He is alert and oriented to person, place, and time. Psychiatric:         Mood and Affect: Mood normal.         Behavior: Behavior normal.                IMPRESSION/RECOMMENDATIONS:      1. Inflammatory arthritis  Start Prednisone 5 mg daily and Arava 20 mg daily for inflammatory arthritis. Check labs as below for monitoring  - predniSONE (DELTASONE) 5 MG tablet; Take 1 tablet by mouth daily  Dispense: 30 tablet; Refill: 5  - leflunomide (ARAVA) 20 MG tablet; Take 1 tablet by mouth daily  Dispense: 30 tablet; Refill: 1  - CBC with Auto Differential; Future  - Comprehensive Metabolic Panel; Future  - C-Reactive Protein; Future  - Sedimentation Rate; Future    2. Encounter for long-term (current) use of high-risk medication  - predniSONE (DELTASONE) 5 MG tablet; Take 1 tablet by mouth daily  Dispense: 30 tablet; Refill: 5  - leflunomide (ARAVA) 20 MG tablet; Take 1 tablet by mouth daily  Dispense: 30 tablet; Refill: 1  - CBC with Auto Differential; Future  - Comprehensive Metabolic Panel; Future  - C-Reactive Protein; Future  - Sedimentation Rate;  Future        Orders Placed This Encounter   Procedures    CBC with Auto Differential     Standing Status:   Future     Standing Expiration Date: 7/16/2023    Comprehensive Metabolic Panel     Standing Status:   Future     Standing Expiration Date:   1/16/2024    C-Reactive Protein     Standing Status:   Future     Standing Expiration Date:   1/16/2024    Sedimentation Rate     Standing Status:   Future     Standing Expiration Date:   1/16/2024      Electronically signed by Francisca Galloway DO on 1/16/2023 at 4:27 PM

## 2023-01-27 ENCOUNTER — OFFICE VISIT (OUTPATIENT)
Dept: CARDIOLOGY CLINIC | Age: 83
End: 2023-01-27
Payer: MEDICARE

## 2023-01-27 VITALS
BODY MASS INDEX: 28.06 KG/M2 | HEART RATE: 80 BPM | RESPIRATION RATE: 18 BRPM | WEIGHT: 196 LBS | HEIGHT: 70 IN | DIASTOLIC BLOOD PRESSURE: 70 MMHG | SYSTOLIC BLOOD PRESSURE: 134 MMHG

## 2023-01-27 DIAGNOSIS — I25.10 CORONARY ARTERY DISEASE INVOLVING NATIVE CORONARY ARTERY OF NATIVE HEART WITHOUT ANGINA PECTORIS: ICD-10-CM

## 2023-01-27 DIAGNOSIS — I20.8 ANGINA OF EFFORT (HCC): ICD-10-CM

## 2023-01-27 DIAGNOSIS — R06.02 SOB (SHORTNESS OF BREATH): Primary | ICD-10-CM

## 2023-01-27 PROCEDURE — 93000 ELECTROCARDIOGRAM COMPLETE: CPT | Performed by: INTERNAL MEDICINE

## 2023-01-27 RX ORDER — OMEPRAZOLE 20 MG/1
20 CAPSULE, DELAYED RELEASE ORAL DAILY
COMMUNITY

## 2023-01-27 RX ORDER — ISOSORBIDE MONONITRATE 30 MG/1
30 TABLET, EXTENDED RELEASE ORAL DAILY
Qty: 30 TABLET | Refills: 3 | Status: SHIPPED | OUTPATIENT
Start: 2023-01-27

## 2023-01-27 ASSESSMENT — ENCOUNTER SYMPTOMS
SHORTNESS OF BREATH: 1
CHEST TIGHTNESS: 1
GASTROINTESTINAL NEGATIVE: 1

## 2023-01-27 NOTE — PROGRESS NOTES
[address] [facility] [address]  Dept: [address]: [phone]           Chief Complaint   Patient presents with    Follow-up       Cardiologist:  Dr. Sergo Ring      Today's visit: 1/27/2023    Subjective:    Review of Systems   Constitutional: Negative. Respiratory:  Positive for chest tightness and shortness of breath. Cardiovascular:  Positive for chest pain. Gastrointestinal: Negative. Genitourinary: Negative. Musculoskeletal: Negative. Neurological: Negative. Psychiatric/Behavioral: Negative. Past Medical History:   Diagnosis Date    CAD (coronary artery disease)     COVID-19 2022    Enlarged lymph nodes     Heart disease     Hyperlipidemia     Hypertension     Plasmacytoma ([address] 2021       Allergies   Allergen Reactions    No Known Allergies        Current Outpatient Medications   Medication Sig Dispense Refill    omeprazole (PRILOSEC) 20 MG delayed release capsule Take 20 mg by mouth daily      isosorbide mononitrate (IMDUR) 30 MG extended release tablet Take 1 tablet by mouth daily 30 tablet 3    predniSONE (DELTASONE) 5 MG tablet Take 1 tablet by mouth daily 30 tablet 5    leflunomide (ARAVA) 20 MG tablet Take 1 tablet by mouth daily 30 tablet 1    pravastatin (PRAVACHOL) 80 MG tablet Take 1 tablet by mouth daily 90 tablet 3    lisinopril (PRINIVIL;ZESTRIL) 20 MG tablet Take 1 tablet by mouth daily 90 tablet 3    metoprolol succinate (TOPROL XL) 50 MG extended release tablet Take 1 tablet by mouth daily 90 tablet 3    LORATADINE ALLERGY RELIEF PO Take by mouth      acetaminophen (TYLENOL) 325 MG tablet Take 650 mg by mouth every 6 hours as needed for Pain      Multiple Vitamins-Minerals (THERAPEUTIC MULTIVITAMIN-MINERALS) tablet Take 1 tablet by mouth daily      aspirin 81 MG tablet Take 81 mg by mouth daily.        No current facility-administered medications for this visit. Social History     Socioeconomic History    Marital status:      Spouse name: uGzman Cash    Number of children: 1    Years of education: None    Highest education level: None   Tobacco Use    Smoking status: Former     Packs/day: 1.00     Years: 37.00     Pack years: 37.00     Types: Cigarettes     Start date: 1964     Quit date: 2001     Years since quittin.3    Smokeless tobacco: Never   Vaping Use    Vaping Use: Never used   Substance and Sexual Activity    Alcohol use: Not Currently     Comment: beer every so often     Drug use: Never       Family History   Problem Relation Age of Onset    Arthritis Mother     Heart Disease Father        Blood pressure 134/70, pulse 80, resp. rate 18, height 5' 10\" (1.778 m), weight 196 lb (88.9 kg). Physical Exam  Constitutional:       Appearance: Normal appearance. Cardiovascular:      Rate and Rhythm: Normal rate and regular rhythm. Heart sounds: Normal heart sounds. Pulmonary:      Effort: Pulmonary effort is normal.      Breath sounds: Normal breath sounds. Abdominal:      General: Bowel sounds are normal.      Palpations: Abdomen is soft. Musculoskeletal:         General: Normal range of motion. Skin:     General: Skin is warm and dry. Neurological:      Mental Status: He is alert. Psychiatric:         Mood and Affect: Mood normal.         Behavior: Behavior normal.         EKG: normal sinus rhythm, nonspecific ST and T waves changes, unchanged from previous tracings. Diagnosis Orders   1. SOB (shortness of breath)  19601 - [place] ELECTROCARDIOGRAM, COMPLETE    Echo 2D w doppler w color complete    Stress test, lexiscan      2. Coronary artery disease involving native coronary artery of native heart without angina pectoris        3.  Angina of effort [facility])  Stress test, lexiscan            Assessment and Plan:    Mr. Ct Hooker is a pleasant 82-year-old  gentleman who presents today for complaints of shortness of breath and chest discomfort. He states that with any physical activity he notices shortness of breath and he gets some chest discomfort. It is very short-lived and does go away with in seconds of sitting down. He denies any type of associated shortness of breath diaphoresis or radiation. He denies any illnesses. He has no CHF symptoms. Blood pressure is stable heart rate is stable. No palpitations are noted with these episodes. Shortness of breath--most likely anginal equivalent. Chest discomfort with exertional activity-anginal equivalent    Prior history of coronary artery disease with left circumflex PCI in 2004 with residual LAD disease. Hypertension-stable    Hyperlipidemia tolerating cholesterol medication without any difficulty. All questions were answered of Mr. Royal Rodriguez and his wife who accompanied him to the office today. We did discuss cardiac catheterization. At first we will get an echocardiogram and a Lexiscan stress test.  I did place him on Imdur 30 mg daily. Mr. Royal Rodriguez was advised to avoid exertional activity. We will follow-up on his testing results. We discussed that he will probably end up with cardiac catheterization to get to the bottom of his symptoms with his past medical history.   Mr. Royal Rodriguez is in agreement    We will follow with Mr. Royal Rodriguez in 6 weeks time or sooner should his stress test be abnormal            Orders Placed This Encounter   Procedures    Stress test, lexiscan     Standing Status:   Future     Standing Expiration Date:   5/27/2023     Order Specific Question:   Reason for Exam?     Answer:   Chest pain     Order Specific Question:   Reason for Exam?     Answer:   Shortness of breath    Echo 2D w doppler w color complete     Standing Status:   Future     Standing Expiration Date:   5/27/2023     Order Specific Question:   Reason for exam:     Answer:   sob / chest pain    [address] - Wilmer Esquivel current treatment plan    There are no Patient Instructions on file for this visit. Return in about 6 weeks (around 3/10/2023), or if symptoms worsen or fail to improve, for CAD- SOB - CP.     Follow up with Dr Ethan Ga as scheduled or sooner if needed    Flaquita Troncoso, APRN - CNP

## 2023-02-16 ENCOUNTER — HOSPITAL ENCOUNTER (OUTPATIENT)
Age: 83
Setting detail: OBSERVATION
Discharge: HOME | End: 2023-02-18
Attending: EMERGENCY MEDICINE | Admitting: HOSPITALIST
Payer: MEDICARE

## 2023-02-16 ENCOUNTER — APPOINTMENT (OUTPATIENT)
Dept: GENERAL RADIOLOGY | Age: 83
End: 2023-02-16
Payer: MEDICARE

## 2023-02-16 DIAGNOSIS — I20.8 STABLE ANGINA PECTORIS (HCC): Primary | ICD-10-CM

## 2023-02-16 PROBLEM — R07.9 CHEST PAIN: Status: ACTIVE | Noted: 2023-02-16

## 2023-02-16 LAB
ANION GAP SERPL CALC-SCNC: 14 MEQ/L (ref 8–16)
BASOPHILS ABSOLUTE: 0 THOU/MM3 (ref 0–0.1)
BASOPHILS NFR BLD AUTO: 0.3 %
BUN SERPL-MCNC: 18 MG/DL (ref 7–22)
CALCIUM SERPL-MCNC: 9.6 MG/DL (ref 8.5–10.5)
CHLORIDE SERPL-SCNC: 105 MEQ/L (ref 98–111)
CO2 SERPL-SCNC: 24 MEQ/L (ref 23–33)
CREAT SERPL-MCNC: 1.3 MG/DL (ref 0.4–1.2)
DEPRECATED RDW RBC AUTO: 56.2 FL (ref 35–45)
EOSINOPHIL NFR BLD AUTO: 0.8 %
EOSINOPHILS ABSOLUTE: 0 THOU/MM3 (ref 0–0.4)
ERYTHROCYTE [DISTWIDTH] IN BLOOD BY AUTOMATED COUNT: 15.7 % (ref 11.5–14.5)
GFR SERPL CREATININE-BSD FRML MDRD: 55 ML/MIN/1.73M2
GLUCOSE SERPL-MCNC: 126 MG/DL (ref 70–108)
HCT VFR BLD AUTO: 36.4 % (ref 42–52)
HGB BLD-MCNC: 11.4 GM/DL (ref 14–18)
IMM GRANULOCYTES # BLD AUTO: 0.05 THOU/MM3 (ref 0–0.07)
IMM GRANULOCYTES NFR BLD AUTO: 0.8 %
LV EF: 55 %
LVEF MODALITY: NORMAL
LYMPHOCYTES ABSOLUTE: 0.6 THOU/MM3 (ref 1–4.8)
LYMPHOCYTES NFR BLD AUTO: 9.5 %
MCH RBC QN AUTO: 30.6 PG (ref 26–33)
MCHC RBC AUTO-ENTMCNC: 31.3 GM/DL (ref 32.2–35.5)
MCV RBC AUTO: 97.6 FL (ref 80–94)
MONOCYTES ABSOLUTE: 0.5 THOU/MM3 (ref 0.4–1.3)
MONOCYTES NFR BLD AUTO: 8.1 %
NEUTROPHILS NFR BLD AUTO: 80.5 %
NRBC BLD AUTO-RTO: 0 /100 WBC
NT-PROBNP SERPL IA-MCNC: 274.9 PG/ML (ref 0–449)
OSMOLALITY SERPL CALC.SUM OF ELEC: 288.4 MOSMOL/KG (ref 275–300)
PLATELET # BLD AUTO: 190 THOU/MM3 (ref 130–400)
PMV BLD AUTO: 10.2 FL (ref 9.4–12.4)
POTASSIUM SERPL-SCNC: 4.2 MEQ/L (ref 3.5–5.2)
RBC # BLD AUTO: 3.73 MILL/MM3 (ref 4.7–6.1)
SEGMENTED NEUTROPHILS ABSOLUTE COUNT: 5 THOU/MM3 (ref 1.8–7.7)
SODIUM SERPL-SCNC: 143 MEQ/L (ref 135–145)
TROPONIN T: < 0.01 NG/ML
WBC # BLD AUTO: 6.2 THOU/MM3 (ref 4.8–10.8)

## 2023-02-16 PROCEDURE — 71045 X-RAY EXAM CHEST 1 VIEW: CPT

## 2023-02-16 PROCEDURE — 36415 COLL VENOUS BLD VENIPUNCTURE: CPT

## 2023-02-16 PROCEDURE — 83880 ASSAY OF NATRIURETIC PEPTIDE: CPT

## 2023-02-16 PROCEDURE — 99285 EMERGENCY DEPT VISIT HI MDM: CPT

## 2023-02-16 PROCEDURE — 85025 COMPLETE CBC W/AUTO DIFF WBC: CPT

## 2023-02-16 PROCEDURE — G0378 HOSPITAL OBSERVATION PER HR: HCPCS

## 2023-02-16 PROCEDURE — 6370000000 HC RX 637 (ALT 250 FOR IP): Performed by: PHYSICIAN ASSISTANT

## 2023-02-16 PROCEDURE — 2580000003 HC RX 258: Performed by: PHYSICIAN ASSISTANT

## 2023-02-16 PROCEDURE — 93005 ELECTROCARDIOGRAM TRACING: CPT | Performed by: STUDENT IN AN ORGANIZED HEALTH CARE EDUCATION/TRAINING PROGRAM

## 2023-02-16 PROCEDURE — 93010 ELECTROCARDIOGRAM REPORT: CPT | Performed by: INTERNAL MEDICINE

## 2023-02-16 PROCEDURE — 6360000002 HC RX W HCPCS: Performed by: PHYSICIAN ASSISTANT

## 2023-02-16 PROCEDURE — 96372 THER/PROPH/DIAG INJ SC/IM: CPT

## 2023-02-16 PROCEDURE — 6370000000 HC RX 637 (ALT 250 FOR IP): Performed by: STUDENT IN AN ORGANIZED HEALTH CARE EDUCATION/TRAINING PROGRAM

## 2023-02-16 PROCEDURE — 93306 TTE W/DOPPLER COMPLETE: CPT

## 2023-02-16 PROCEDURE — 80048 BASIC METABOLIC PNL TOTAL CA: CPT

## 2023-02-16 PROCEDURE — 99223 1ST HOSP IP/OBS HIGH 75: CPT | Performed by: PHYSICIAN ASSISTANT

## 2023-02-16 PROCEDURE — 84484 ASSAY OF TROPONIN QUANT: CPT

## 2023-02-16 RX ORDER — ONDANSETRON 2 MG/ML
4 INJECTION INTRAMUSCULAR; INTRAVENOUS EVERY 6 HOURS PRN
Status: DISCONTINUED | OUTPATIENT
Start: 2023-02-16 | End: 2023-02-18 | Stop reason: HOSPADM

## 2023-02-16 RX ORDER — ACETAMINOPHEN 650 MG/1
650 SUPPOSITORY RECTAL EVERY 6 HOURS PRN
Status: DISCONTINUED | OUTPATIENT
Start: 2023-02-16 | End: 2023-02-18 | Stop reason: HOSPADM

## 2023-02-16 RX ORDER — ENOXAPARIN SODIUM 100 MG/ML
40 INJECTION SUBCUTANEOUS EVERY 24 HOURS
Status: DISCONTINUED | OUTPATIENT
Start: 2023-02-16 | End: 2023-02-18 | Stop reason: HOSPADM

## 2023-02-16 RX ORDER — ONDANSETRON 4 MG/1
4 TABLET, ORALLY DISINTEGRATING ORAL EVERY 8 HOURS PRN
Status: DISCONTINUED | OUTPATIENT
Start: 2023-02-16 | End: 2023-02-18 | Stop reason: HOSPADM

## 2023-02-16 RX ORDER — PREDNISONE 1 MG/1
5 TABLET ORAL DAILY
Status: DISCONTINUED | OUTPATIENT
Start: 2023-02-16 | End: 2023-02-18 | Stop reason: HOSPADM

## 2023-02-16 RX ORDER — LISINOPRIL 20 MG/1
20 TABLET ORAL DAILY
Status: DISCONTINUED | OUTPATIENT
Start: 2023-02-17 | End: 2023-02-18 | Stop reason: HOSPADM

## 2023-02-16 RX ORDER — METOPROLOL SUCCINATE 50 MG/1
50 TABLET, EXTENDED RELEASE ORAL DAILY
Status: DISCONTINUED | OUTPATIENT
Start: 2023-02-17 | End: 2023-02-18 | Stop reason: HOSPADM

## 2023-02-16 RX ORDER — ACETAMINOPHEN 325 MG/1
650 TABLET ORAL EVERY 6 HOURS PRN
Status: DISCONTINUED | OUTPATIENT
Start: 2023-02-16 | End: 2023-02-18 | Stop reason: HOSPADM

## 2023-02-16 RX ORDER — SODIUM CHLORIDE 0.9 % (FLUSH) 0.9 %
5-40 SYRINGE (ML) INJECTION EVERY 12 HOURS SCHEDULED
Status: DISCONTINUED | OUTPATIENT
Start: 2023-02-16 | End: 2023-02-18 | Stop reason: HOSPADM

## 2023-02-16 RX ORDER — ASPIRIN 81 MG/1
324 TABLET, CHEWABLE ORAL ONCE
Status: COMPLETED | OUTPATIENT
Start: 2023-02-16 | End: 2023-02-16

## 2023-02-16 RX ORDER — ASPIRIN 81 MG/1
81 TABLET ORAL DAILY
Status: DISCONTINUED | OUTPATIENT
Start: 2023-02-17 | End: 2023-02-18 | Stop reason: HOSPADM

## 2023-02-16 RX ORDER — PANTOPRAZOLE SODIUM 40 MG/1
40 TABLET, DELAYED RELEASE ORAL
Status: DISCONTINUED | OUTPATIENT
Start: 2023-02-17 | End: 2023-02-18 | Stop reason: HOSPADM

## 2023-02-16 RX ORDER — POLYETHYLENE GLYCOL 3350 17 G/17G
17 POWDER, FOR SOLUTION ORAL DAILY PRN
Status: DISCONTINUED | OUTPATIENT
Start: 2023-02-16 | End: 2023-02-18 | Stop reason: HOSPADM

## 2023-02-16 RX ORDER — ISOSORBIDE MONONITRATE 30 MG/1
30 TABLET, EXTENDED RELEASE ORAL DAILY
Status: DISCONTINUED | OUTPATIENT
Start: 2023-02-17 | End: 2023-02-18 | Stop reason: HOSPADM

## 2023-02-16 RX ORDER — SODIUM CHLORIDE 9 MG/ML
INJECTION, SOLUTION INTRAVENOUS PRN
Status: DISCONTINUED | OUTPATIENT
Start: 2023-02-16 | End: 2023-02-18 | Stop reason: HOSPADM

## 2023-02-16 RX ORDER — LEFLUNOMIDE 10 MG/1
20 TABLET ORAL DAILY
Status: DISCONTINUED | OUTPATIENT
Start: 2023-02-16 | End: 2023-02-18 | Stop reason: HOSPADM

## 2023-02-16 RX ORDER — SODIUM CHLORIDE 0.9 % (FLUSH) 0.9 %
5-40 SYRINGE (ML) INJECTION PRN
Status: DISCONTINUED | OUTPATIENT
Start: 2023-02-16 | End: 2023-02-18 | Stop reason: HOSPADM

## 2023-02-16 RX ORDER — ATORVASTATIN CALCIUM 40 MG/1
40 TABLET, FILM COATED ORAL NIGHTLY
Status: DISCONTINUED | OUTPATIENT
Start: 2023-02-16 | End: 2023-02-18 | Stop reason: HOSPADM

## 2023-02-16 RX ADMIN — ENOXAPARIN SODIUM 40 MG: 100 INJECTION SUBCUTANEOUS at 14:07

## 2023-02-16 RX ADMIN — LEFLUNOMIDE 20 MG: 10 TABLET ORAL at 15:34

## 2023-02-16 RX ADMIN — ASPIRIN 81 MG 324 MG: 81 TABLET ORAL at 12:01

## 2023-02-16 RX ADMIN — ATORVASTATIN CALCIUM 40 MG: 40 TABLET, FILM COATED ORAL at 19:37

## 2023-02-16 RX ADMIN — SODIUM CHLORIDE, PRESERVATIVE FREE 10 ML: 5 INJECTION INTRAVENOUS at 19:37

## 2023-02-16 RX ADMIN — PREDNISONE 5 MG: 5 TABLET ORAL at 15:34

## 2023-02-16 ASSESSMENT — PAIN SCALES - GENERAL
PAINLEVEL_OUTOF10: 0
PAINLEVEL_OUTOF10: 0

## 2023-02-16 ASSESSMENT — HEART SCORE: ECG: 1

## 2023-02-16 ASSESSMENT — LIFESTYLE VARIABLES
HOW OFTEN DO YOU HAVE A DRINK CONTAINING ALCOHOL: MONTHLY OR LESS
HOW MANY STANDARD DRINKS CONTAINING ALCOHOL DO YOU HAVE ON A TYPICAL DAY: 1 OR 2

## 2023-02-16 NOTE — PROGRESS NOTES
02/16/23 1415   Encounter Summary   Encounter Overview/Reason  Advance Care Planning   Service Provided For: Patient and family together   Referral/Consult From: Rounding;Nurse   Support System Spouse   Last Encounter  02/16/23   Complexity of Encounter Moderate   Advance Care Planning   Type ACP conversation   Advance Directive Consult: Advance Directive materials were provided to patient and explained. Patient is not ready to complete at this time, gave information for Spiritual Care to be contacted if further assistance is needed.

## 2023-02-16 NOTE — ED PROVIDER NOTES
[facility] [address] [address] EMERGENCY DEPT        Pt Name: Mary Michelle  MRN: [id number]  [place] 1940  Date of evaluation: 2/16/2023  Treating Resident Physician: Micki Hunter MD  Supervising Physician: Dr. Kathi Jasso, DO    [facility]       Chief Complaint   Patient presents with    Chest Pain     Ongoing for weeks- pain only with activity    Shortness of Breath           HISTORY OF PRESENT ILLNESS & REVIEW OF SYSTEMS   HPI    History obtained from patient and family member at bedside. Mary Michelle is a 82 y.o. male who presents to the emergency department for evaluation of chest pain and shortness of breath. Patient states that he saw his cardiologist at the end of January who scheduled him for an outpatient ultrasound as well as stress test.  States he has been having worsening chest pain and shortness of breath on exertion. Says that when he rests it goes away completely. Says that it has been getting worse and worse so he wanted to get checked out because his testing is scheduled for March and he thinks he should be seen sooner. Denies any falls or trauma. Denies any lightheadedness or syncope. Denies any chest pain or shortness of breath currently at rest.  Previous MI. Denies any headache fever chills cough abdominal pain nausea vomiting diarrhea constipation leg swelling. Patient denies any new Headache, Fever, Chills, Cough, Abdominal pain, Nausea, Vomiting, Diarrhea, Constipation, and Leg swelling. The patient has no other acute complaints at this time. Review of systems as above.           PAST MEDICAL AND SURGICAL HISTORY     Past Medical History:   Diagnosis Date    CAD (coronary artery disease)     COVID-19 2022    Enlarged lymph nodes     Heart disease     Hyperlipidemia     Hypertension     Plasmacytoma (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 9) E66.9         MEDICATIONS   No current facility-administered medications for this encounter. Current Outpatient Medications:     omeprazole (PRILOSEC) 20 MG delayed release capsule, Take 20 mg by mouth daily, Disp: , Rfl:     isosorbide mononitrate (IMDUR) 30 MG extended release tablet, Take 1 tablet by mouth daily, Disp: 30 tablet, Rfl: 3    predniSONE (DELTASONE) 5 MG tablet, Take 1 tablet by mouth daily, Disp: 30 tablet, Rfl: 5    leflunomide (ARAVA) 20 MG tablet, Take 1 tablet by mouth daily, Disp: 30 tablet, Rfl: 1    pravastatin (PRAVACHOL) 80 MG tablet, Take 1 tablet by mouth daily, Disp: 90 tablet, Rfl: 3    lisinopril (PRINIVIL;ZESTRIL) 20 MG tablet, Take 1 tablet by mouth daily, Disp: 90 tablet, Rfl: 3    metoprolol succinate (TOPROL XL) 50 MG extended release tablet, Take 1 tablet by mouth daily, Disp: 90 tablet, Rfl: 3    LORATADINE ALLERGY RELIEF PO, Take by mouth, Disp: , Rfl:     acetaminophen (TYLENOL) 325 MG tablet, Take 650 mg by mouth every 6 hours as needed for Pain, Disp: , Rfl:     Multiple Vitamins-Minerals (THERAPEUTIC MULTIVITAMIN-MINERALS) tablet, Take 1 tablet by mouth daily, Disp: , Rfl:     aspirin 81 MG tablet, Take 81 mg by mouth daily. , Disp: , Rfl:   Previous Medications    ACETAMINOPHEN (TYLENOL) 325 MG TABLET    Take 650 mg by mouth every 6 hours as needed for Pain    ASPIRIN 81 MG TABLET    Take 81 mg by mouth daily.     ISOSORBIDE MONONITRATE (IMDUR) 30 MG EXTENDED RELEASE TABLET    Take 1 tablet by mouth daily    LEFLUNOMIDE (ARAVA) 20 MG TABLET    Take 1 tablet by mouth daily    LISINOPRIL (PRINIVIL;ZESTRIL) 20 MG TABLET    Take 1 tablet by mouth daily    LORATADINE ALLERGY RELIEF PO    Take by mouth    METOPROLOL SUCCINATE (TOPROL XL) 50 MG EXTENDED RELEASE TABLET    Take 1 tablet by mouth daily    MULTIPLE VITAMINS-MINERALS (THERAPEUTIC MULTIVITAMIN-MINERALS) TABLET    Take 1 tablet by mouth daily    OMEPRAZOLE (PRILOSEC) 20 MG DELAYED RELEASE CAPSULE    Take 20 mg by mouth daily    PRAVASTATIN (PRAVACHOL) 80 MG TABLET    Take 1 tablet by mouth daily    PREDNISONE (DELTASONE) 5 MG TABLET    Take 1 tablet by mouth daily         SOCIAL HISTORY     Social History     Social History Narrative    Not on file     Social History     Tobacco Use    Smoking status: Former     Packs/day: 1.00     Years: 37.00     Pack years: 37.00     Types: Cigarettes     Start date: 1964     Quit date: 2001     Years since quittin.4    Smokeless tobacco: Never   Vaping Use    Vaping Use: Never used   Substance Use Topics    Alcohol use: Not Currently     Comment: beer every so often     Drug use: Never         ALLERGIES     Allergies   Allergen Reactions    No Known Allergies          FAMILY HISTORY     Family History   Problem Relation Age of Onset    Arthritis Mother     Heart Disease Father          PREVIOUS RECORDS   Previous records reviewed:  Patient was seen 2023 for cardiology office visit follow-up regarding shortness of breath . PHYSICAL EXAM     ED Triage Vitals   BP Temp Temp src Pulse Resp SpO2 Height Weight   -- -- -- -- -- -- -- --     Initial vital signs and nursing assessment reviewed and vitals are/show: Afebrile, Hypertensive, Normocardic, and Normal RR. Pulsoximetry is normal per my interpretation. Additional Vital Signs:  Vitals:    23 1202   BP: 137/84   Pulse: 73   Resp: 17   Temp:    SpO2: 97%       Physical Exam  Constitutional:       General: He is not in acute distress. Appearance: Normal appearance. He is not ill-appearing, toxic-appearing or diaphoretic.    HENT:      Head: Normocephalic and atraumatic. Right Ear: External ear normal.      Left Ear: External ear normal.   Eyes:      General: No scleral icterus. Right eye: No discharge. Left eye: No discharge. Cardiovascular:      Rate and Rhythm: Normal rate and regular rhythm. Pulmonary:      Effort: Pulmonary effort is normal. No respiratory distress. Breath sounds: Normal breath sounds. No stridor. No wheezing, rhonchi or rales. Chest:      Chest wall: No tenderness. Abdominal:      General: Abdomen is flat. There is no distension. Palpations: Abdomen is soft. Tenderness: There is no abdominal tenderness. There is no guarding or rebound. Musculoskeletal:      Cervical back: Neck supple. Right lower leg: No edema. Left lower leg: No edema. Skin:     General: Skin is warm and dry. Neurological:      Mental Status: He is alert and oriented to person, place, and time. Mental status is at baseline. Psychiatric:         Mood and Affect: Mood normal.         Behavior: Behavior normal.         Thought Content: Thought content normal.         Judgment: Judgment normal.           MEDICAL DECISION MAKING/ED COURSE   Initial Assessment:     82 y.o. male with a past medical history of hyperlipidemia, hypertension, CAD, plasmacytoma presenting to the ED for chest pain and shortness of breath    Differential diagnoses include but not limited to:  ACS ACS, arrhythmia, aortic dissection, cardiac tamponade, pneumothorax, PE, esophageal rupture, pneumonia, CHF, COPD, myocarditis, pericarditis, electrolyte abnormality, valvular heart disease, GERD, musculoskeletal    Plan:       EKG  Labs  Imaging  Aspirin  HEART Score: 7  Admission        Brief Summary of Patient Presentation:    Patient is a 82 y.o. male with a past medical history of hyperlipidemia, hypertension, CAD, plasmacytoma who was seen and evaluated in the emergency department for chest pain and shortness of breath.   EKG showed no STEMI but did show some ST depressions. His symptoms sound very suspicious for stable angina and is concerning given that they are progressing. His lab work was unremarkable. Imaging was unremarkable. His heart score is a 7. We gave him some aspirin. Given his heart score of 7 and concern for cardiac cause of his symptoms, after discussion with patient and family at bedside, through shared decision-making, patient was agreeable for admission. Patient admitted. Patient was not having active chest pain in the ED. The patient was seen and examined. Appropriate diagnostic testing was performed and results reviewed with the patient. My independent review and interpretation of data, imaging, labs and diagnostic evaluations are as above and in the ED Course. Previous patient encounter documents & history available on EMR was reviewed  Case discussed with consulting clinician:  admitting team  Shared Decision-Making was performed and disposition discussed with the Patient/Family and questions answered. MDM  Number of Diagnoses or Management Options  Stable angina pectoris ([address]): established, worsening     Amount and/or Complexity of Data Reviewed  Clinical lab tests: ordered and reviewed  Tests in the radiology section of CPT®: ordered and reviewed  Tests in the medicine section of CPT®: ordered and reviewed  Decide to obtain previous medical records or to obtain history from someone other than the patient: yes  Obtain history from someone other than the patient: yes  Review and summarize past medical records: yes  Discuss the patient with other providers: yes  Independent visualization of images, tracings, or specimens: yes    Patient Progress  Patient progress: stable  /  ED Course as of 02/16/23 1209   Thu Feb 16, 2023   1032 Per chart review from cardiology office visit on January 27, 2023:     \"Prior history of coronary artery disease with left circumflex PCI in 2004 with residual LAD disease. Hypertension-stable     Hyperlipidemia tolerating cholesterol medication without any difficulty. All questions were answered of Mr. Mary Jo Gagnon and his wife who accompanied him to the office today. We did discuss cardiac catheterization. At first we will get an echocardiogram and a Lexiscan stress test.  I did place him on Imdur 30 mg daily. Mr. Mary Jo Gagnon was advised to avoid exertional activity. We will follow-up on his testing results. We discussed that he will probably end up with cardiac catheterization to get to the bottom of his symptoms with his past medical history. Mr. Mary Jo Gagnon is in agreement     We will follow with Mr. Mary Jo Gagnon in 6 weeks time or sooner should his stress test be abnormal\"   [CR]   2617 Paged Shelby Glover for admission [CR]   1057 EKG shows ST depression in lead II, V3, V4, V5, no reciprocal changes. [CR]   1141 CBC unremarkable  BMP shows creatinine of 1.3 otherwise unremarkable  Troponin BNP within normal limits. [CR]   3559 CXR:IMPRESSION:  1. Normal heart size. Evidence for old, healed granulomatous disease. 2. No acute findings. No infiltrates or effusions are seen. [CR]      ED Course User Index  [CR] Kiran Perez MD       ED COURSE:  ED Course as of 02/16/23 1209   Thu Feb 16, 2023   1032 Per chart review from cardiology office visit on January 27, 2023:     \"Prior history of coronary artery disease with left circumflex PCI in 2004 with residual LAD disease. Hypertension-stable     Hyperlipidemia tolerating cholesterol medication without any difficulty. All questions were answered of Mr. Mary Jo Gagnon and his wife who accompanied him to the office today. We did discuss cardiac catheterization. At first we will get an echocardiogram and a Lexiscan stress test.  I did place him on Imdur 30 mg daily. Mr. Mary Jo Gagnon was advised to avoid exertional activity. We will follow-up on his testing results.   We discussed that he will probably end up with cardiac catheterization to get to the bottom of his symptoms with his past medical history. Mr. Lexie Carvalho is in agreement     We will follow with Mr. Lexie Carvalho in 6 weeks time or sooner should his stress test be abnormal\"   [CR]   7938 Paged RuddyShelby Upton for admission [CR]   1058 EKG shows ST depression in lead II, V3, V4, V5, no reciprocal changes. [CR]   1141 CBC unremarkable  BMP shows creatinine of 1.3 otherwise unremarkable  Troponin BNP within normal limits. [CR]   6377 CXR:IMPRESSION:  1. Normal heart size. Evidence for old, healed granulomatous disease. 2. No acute findings. No infiltrates or effusions are seen. [CR]      ED Course User Index  [CR] Titus Benoit MD                 ED Medications administered this visit:  (None if blank)  Medications   aspirin chewable tablet 324 mg (324 mg Oral Given 2/16/23 1201)         PROCEDURES: (None if blank)  Procedures:     CRITICAL CARE: (None if blank)      DISCHARGE PRESCRIPTIONS: (None if blank)  New Prescriptions    No medications on file       FORMAL DIAGNOSTIC RESULTS     RADIOLOGY: Interpretation per the Radiologist below, if available at the time of this note (none if blank):    XR CHEST PORTABLE   Final Result   1. Normal heart size. Evidence for old, healed granulomatous disease. 2. No acute findings. No infiltrates or effusions are seen. **This report has been created using voice recognition software. It may contain minor errors which are inherent in voice recognition technology. **      Final report electronically signed by Dr. Avinash Morales on 2/16/2023 11:28 AM          LABS: (none if blank)  Labs Reviewed   BASIC METABOLIC PANEL - Abnormal; Notable for the following components:       Result Value    Glucose 126 (*)     Creatinine 1.3 (*)     All other components within normal limits   CBC WITH AUTO DIFFERENTIAL - Abnormal; Notable for the following components:    RBC 3.73 (*)     Hemoglobin 11.4 (*)     Hematocrit 36.4 (*)     MCV 97.6 (*)     MCHC 31.3 (*)     RDW-CV 15.7 (*)     RDW-SD 56.2 (*)     Lymphocytes Absolute 0.6 (*)     All other components within normal limits   GLOMERULAR FILTRATION RATE, ESTIMATED - Abnormal; Notable for the following components:    Est, Glom Filt Rate 55 (*)     All other components within normal limits   TROPONIN   BRAIN NATRIURETIC PEPTIDE   ANION GAP   OSMOLALITY                 MEDICATION CHANGES     New Prescriptions    No medications on file       FINAL IMPRESSION      1. Stable angina pectoris ([address]          FINAL DISPOSITION     Final diagnoses:   Stable angina pectoris ([address]     Condition: condition: stable  Dispo: Admit to med/surg floor      This transcription was electronically signed. Parts of this transcriptions may have been dictated by use of voice recognition software and electronically transcribed, and parts may have been transcribed with the assistance of an ED scribe. The transcription may contain errors not detected in proofreading. Please refer to my supervising physician's documentation if my documentation differs.     Electronically Signed: Edith Curling, MD, 02/16/23, 12:09 PM         Edith Curling, MD  Resident  02/16/23 0495

## 2023-02-16 NOTE — ED NOTES
Pt updated on POC/admit status. Denies current needs or concerns- appears in no acute distress, VSS.       Rajendra Hunt RN  02/16/23 3797

## 2023-02-16 NOTE — H&P
Hospitalist History & Physical    Patient:  Ameya Zaman    Unit/Bed:8A-20/020-A  YOB: 1940  MRN: [id number]   Acct: [de-identified]   PCP: Jaye Bergeron DO  Code Status: Full Code    Date of Service: Pt seen/examined on 02/16/23 and admitted to Observation with expected LOS less than two midnights due to medical therapy. Chief Complaint: Chest pain    Assessment/Plan:    Chest pain  Heart score 7. EKG with nonspecific ST abnormality. Troponin negative. Trend troponin x2. Patient scheduled to have outpatient stress test and echocardiogram in near future. We will consult cardiology for further evaluation. Echocardiogram ordered. Will defer decision regarding stress test versus cardiac catheterization to cardiology. Continue aspirin  History of coronary artery disease  Patient with history of PCI in 2004  Continue antiplatelets, aggressive risk factor control  CKD stage 3  Creatinine stable at 1.3. Monitor. Hyperlipidemia  Substitute Lipitor for pravastatin  Hypertension  Continue home regimen  History of plasmacytoma  Follows with krzysztof Guy    History of Present Illness:  Ameya Zaman is a 82 y.o. male with a history of coronary artery disease, hyperlipidemia, hypertension, plasmacytoma, and inflammatory Tritus who presented to [facility] with chief complaint of chest pain and shortness of breath. The patient states over the recent weeks he has been having intermittent chest shortness of breath. This primarily occurs with exertion. The pain is substernal and dull in nature. It comes on with exertion and is relieved with rest.  It is associated with shortness of breath. There is no associated palpitations, radiation, paroxysmal nocturnal dyspnea, or orthopnea. The patient does have a history of coronary artery disease and underwent PCI in 2004. He has no history of congestive heart failure.   He recently was seen by his cardiologist who ordered an outpatient stress test or echocardiogram which was scheduled to be done in March. However, the patient's symptoms became more frequent and severe, so he decided to seek treatment. He denies any other symptoms at this time. Review of Systems: Pertinent positives as noted in the HPI. All other systems reviewed and negative. Past Medical History:        Diagnosis Date    CAD (coronary artery disease)     COVID-19 2022    Enlarged lymph nodes     Heart disease     Hyperlipidemia     Hypertension     Plasmacytoma ([address] 2021       Past Surgical History:        Procedure Laterality Date    ABSCESS DRAINAGE      SPIDER BITE    BRONCHOSCOPY N/A 10/22/2021    BRONCHOSCOPY W/EBUS FNA performed by Rosey Villafana MD at [address] [place] [address]  2004    x2    COLONOSCOPY      CT BONE MARROW BIOPSY  09/29/2021    CT BONE MARROW BIOPSY 9/29/2021 [facility] CT SCAN    HERNIA REPAIR  05/18/2016       Home Medications:   No current facility-administered medications on file prior to encounter.      Current Outpatient Medications on File Prior to Encounter   Medication Sig Dispense Refill    omeprazole (PRILOSEC) 20 MG delayed release capsule Take 20 mg by mouth daily      isosorbide mononitrate (IMDUR) 30 MG extended release tablet Take 1 tablet by mouth daily 30 tablet 3    predniSONE (DELTASONE) 5 MG tablet Take 1 tablet by mouth daily 30 tablet 5    leflunomide (ARAVA) 20 MG tablet Take 1 tablet by mouth daily 30 tablet 1    pravastatin (PRAVACHOL) 80 MG tablet Take 1 tablet by mouth daily 90 tablet 3    lisinopril (PRINIVIL;ZESTRIL) 20 MG tablet Take 1 tablet by mouth daily 90 tablet 3    metoprolol succinate (TOPROL XL) 50 MG extended release tablet Take 1 tablet by mouth daily 90 tablet 3    LORATADINE ALLERGY RELIEF PO Take by mouth      acetaminophen (TYLENOL) 325 MG tablet Take 650 mg by mouth every 6 hours as needed for Pain      Multiple Vitamins-Minerals (THERAPEUTIC MULTIVITAMIN-MINERALS) tablet Take 1 tablet by mouth daily      aspirin 81 MG tablet Take 81 mg by mouth daily. Allergies:    No known allergies    Social History:    reports that he quit smoking about 21 years ago. His smoking use included cigarettes. He started smoking about 58 years ago. He has a 37.00 pack-year smoking history. He has never used smokeless tobacco. He reports that he does not currently use alcohol. He reports that he does not use drugs. Family History:       Problem Relation Age of Onset    Arthritis Mother     Heart Disease Father        Diet:  Diet NPO      Physical Exam:  BP (!) 142/84   Pulse 65   Temp 98.1 °F (36.7 °C) (Oral)   Resp 17   Ht 5' 10\" (1.778 m)   Wt 200 lb 3.2 oz (90.8 kg)   SpO2 98%   BMI 28.73 kg/m²   General: Alert, in no acute distress, cooperative  HEENT:  Normocephalic and atraumatic. No scleral icterus. Pupils equal, round, and reactive to light. External nares without deformity. External ears normal.  Neck: Supple. No JVD. No thyromegaly. No cervical lymphadenopathy. Lungs: On room air. Respiratory rate and effort normal.  Lungs clear to auscultation bilaterally. Cardiac: Regular rate and rhythm. No murmur, rubs, or gallops. Abdomen: Nondistended, soft, no tenderness to palpation, guarding, rigidity. Bowel sounds normoactive. Extremities:  No clubbing, cyanosis, or lower extremity edema. Vasculature: capillary refill < 3 seconds. Lower extremity pulses 2+ bilaterally  Skin:  Warm and dry. No rashes or lesions. Psych: Mood normal.  Affect appropriate. Neurologic: Alert and oriented x4. No cranial nerve deficits. No extremity weakness.     Data: (All radiographs, tracings, PFTs, and imaging are personally viewed and interpreted unless otherwise noted)  Labs:   Recent Labs     02/16/23  1055   WBC 6.2   HGB 11.4*   HCT 36.4*        Recent Labs     02/16/23  1055      K 4.2      CO2 24   BUN 18   CREATININE 1.3* CALCIUM 9.6     No results for input(s): AST, ALT, BILIDIR, BILITOT, ALKPHOS in the last 72 hours. No results for input(s): INR in the last 72 hours. No results for input(s): Margaux Highman in the last 72 hours. Urinalysis:   No results found for: NITRU, WBCUA, BACTERIA, RBCUA, BLOODU, SPECGRAV, GLUCOSEU    EKG: Normal sinus rhythm, nonspecific T wave changes    Radiology:  XR CHEST PORTABLE    Result Date: 2/16/2023  PROCEDURE: XR CHEST PORTABLE CLINICAL INFORMATION: CP & SOB on exertion COMPARISON: 10/22/2021 TECHNIQUE: A single mobile view of the chest was obtained. 1. Normal heart size. Evidence for old, healed granulomatous disease. 2. No acute findings. No infiltrates or effusions are seen. **This report has been created using voice recognition software. It may contain minor errors which are inherent in voice recognition technology. ** Final report electronically signed by Dr. Giorgio Young on 2/16/2023 11:28 AM        DVT prophylaxis: Lovenox    Diet: Diet NPO    Fluids: By mouth    Code Status: Full Code    PT/OT Eval Status: Active and ongoing    Tele:   [x] yes             [] no      Thank you Shay Bergeron DO for the opportunity to be involved in this patient's care.     Electronically signed by Leigh Ann Gil PA-C on 2/16/2023 at 3:12 PM

## 2023-02-16 NOTE — ED NOTES
Pt transported via wheelchair to 20. Floor called prior to transport,spoke to Pushpa Plascencia.      Julian Adame  02/16/23 4749

## 2023-02-16 NOTE — ED NOTES
ED to inpatient nurses report    Chief Complaint   Patient presents with    Chest Pain     Ongoing for weeks- pain only with activity    Shortness of Breath      Present to ED from home  LOC: alert and orientated to name, place, date  Vital signs   Vitals:    02/16/23 1037 02/16/23 1139 02/16/23 1202   BP: (!) 143/75 (!) 169/97 137/84   Pulse: 88 77 73   Resp: 17 17 17   Temp: 97.5 °F (36.4 °C)     TempSrc: Oral     SpO2: 98% 96% 97%   Weight: 200 lb (90.7 kg)     Height: 5' 10\" (1.778 m)        Oxygen Baseline RA    Current needs required  RA Bipap/Cpap No  LDAs:   Peripheral IV 02/16/23 Left Antecubital (Active)   Site Assessment Clean, dry & intact 02/16/23 1139   Line Status Flushed 02/16/23 1139     Mobility: Independent  Pending ED orders: None  Present condition: Restful on cart- pain free, no distress.  VSS  C-SSRS Risk of Suicide: No Risk  Swallow Screening    Preferred Language: English     Electronically signed by Kvng Garcia RN on 2/16/2023 at 12:28 PM       Sowmya Alexandra RN  02/16/23 9740

## 2023-02-16 NOTE — CARE COORDINATION
Spoke with patient and spouse who advised do not have ACP documents at home. Would like to complete this visit. Denied concerns or questions.

## 2023-02-17 PROBLEM — I20.0 UNSTABLE ANGINA (HCC): Status: ACTIVE | Noted: 2023-02-17

## 2023-02-17 LAB
ACTIVATED CLOTTING TIME: 269 SECONDS (ref 1–150)
ANION GAP SERPL CALC-SCNC: 12 MEQ/L (ref 8–16)
BUN SERPL-MCNC: 17 MG/DL (ref 7–22)
CALCIUM SERPL-MCNC: 9.7 MG/DL (ref 8.5–10.5)
CHLORIDE SERPL-SCNC: 105 MEQ/L (ref 98–111)
CO2 SERPL-SCNC: 25 MEQ/L (ref 23–33)
CREAT SERPL-MCNC: 1.3 MG/DL (ref 0.4–1.2)
DEPRECATED RDW RBC AUTO: 53.4 FL (ref 35–45)
EKG ATRIAL RATE: 65 BPM
EKG ATRIAL RATE: 69 BPM
EKG ATRIAL RATE: 82 BPM
EKG P AXIS: 35 DEGREES
EKG P AXIS: 36 DEGREES
EKG P AXIS: 53 DEGREES
EKG P-R INTERVAL: 180 MS
EKG P-R INTERVAL: 184 MS
EKG P-R INTERVAL: 184 MS
EKG Q-T INTERVAL: 364 MS
EKG Q-T INTERVAL: 408 MS
EKG Q-T INTERVAL: 410 MS
EKG QRS DURATION: 68 MS
EKG QRS DURATION: 80 MS
EKG QRS DURATION: 82 MS
EKG QTC CALCULATION (BAZETT): 425 MS
EKG QTC CALCULATION (BAZETT): 426 MS
EKG QTC CALCULATION (BAZETT): 437 MS
EKG R AXIS: -7 DEGREES
EKG R AXIS: 20 DEGREES
EKG R AXIS: 8 DEGREES
EKG T AXIS: -22 DEGREES
EKG T AXIS: 12 DEGREES
EKG T AXIS: 9 DEGREES
EKG VENTRICULAR RATE: 65 BPM
EKG VENTRICULAR RATE: 69 BPM
EKG VENTRICULAR RATE: 82 BPM
ERYTHROCYTE [DISTWIDTH] IN BLOOD BY AUTOMATED COUNT: 15.4 % (ref 11.5–14.5)
GFR SERPL CREATININE-BSD FRML MDRD: 55 ML/MIN/1.73M2
GLUCOSE SERPL-MCNC: 96 MG/DL (ref 70–108)
HCT VFR BLD AUTO: 37.7 % (ref 42–52)
HGB BLD-MCNC: 12.3 GM/DL (ref 14–18)
MCH RBC QN AUTO: 30.8 PG (ref 26–33)
MCHC RBC AUTO-ENTMCNC: 32.6 GM/DL (ref 32.2–35.5)
MCV RBC AUTO: 94.5 FL (ref 80–94)
PLATELET # BLD AUTO: 202 THOU/MM3 (ref 130–400)
PMV BLD AUTO: 10.4 FL (ref 9.4–12.4)
POTASSIUM SERPL-SCNC: 4.3 MEQ/L (ref 3.5–5.2)
RBC # BLD AUTO: 3.99 MILL/MM3 (ref 4.7–6.1)
SODIUM SERPL-SCNC: 142 MEQ/L (ref 135–145)
WBC # BLD AUTO: 4.3 THOU/MM3 (ref 4.8–10.8)

## 2023-02-17 PROCEDURE — 93005 ELECTROCARDIOGRAM TRACING: CPT | Performed by: PHYSICIAN ASSISTANT

## 2023-02-17 PROCEDURE — C1894 INTRO/SHEATH, NON-LASER: HCPCS

## 2023-02-17 PROCEDURE — 93010 ELECTROCARDIOGRAM REPORT: CPT | Performed by: INTERNAL MEDICINE

## 2023-02-17 PROCEDURE — C1725 CATH, TRANSLUMIN NON-LASER: HCPCS

## 2023-02-17 PROCEDURE — 2500000003 HC RX 250 WO HCPCS

## 2023-02-17 PROCEDURE — 99223 1ST HOSP IP/OBS HIGH 75: CPT | Performed by: INTERNAL MEDICINE

## 2023-02-17 PROCEDURE — 36415 COLL VENOUS BLD VENIPUNCTURE: CPT

## 2023-02-17 PROCEDURE — 6370000000 HC RX 637 (ALT 250 FOR IP): Performed by: PHYSICIAN ASSISTANT

## 2023-02-17 PROCEDURE — 6360000004 HC RX CONTRAST MEDICATION: Performed by: INTERNAL MEDICINE

## 2023-02-17 PROCEDURE — C1887 CATHETER, GUIDING: HCPCS

## 2023-02-17 PROCEDURE — 92928 PRQ TCAT PLMT NTRAC ST 1 LES: CPT | Performed by: INTERNAL MEDICINE

## 2023-02-17 PROCEDURE — 93458 L HRT ARTERY/VENTRICLE ANGIO: CPT

## 2023-02-17 PROCEDURE — 80048 BASIC METABOLIC PNL TOTAL CA: CPT

## 2023-02-17 PROCEDURE — 85347 COAGULATION TIME ACTIVATED: CPT

## 2023-02-17 PROCEDURE — 92929 PR PRQ TRLUML CORONARY STENT W/ANGIO ADDL ART/BRNCH: CPT | Performed by: INTERNAL MEDICINE

## 2023-02-17 PROCEDURE — 6360000002 HC RX W HCPCS

## 2023-02-17 PROCEDURE — C1874 STENT, COATED/COV W/DEL SYS: HCPCS

## 2023-02-17 PROCEDURE — C1769 GUIDE WIRE: HCPCS

## 2023-02-17 PROCEDURE — 93458 L HRT ARTERY/VENTRICLE ANGIO: CPT | Performed by: INTERNAL MEDICINE

## 2023-02-17 PROCEDURE — 6370000000 HC RX 637 (ALT 250 FOR IP): Performed by: INTERNAL MEDICINE

## 2023-02-17 PROCEDURE — C9601 PERC DRUG-EL COR STENT BRAN: HCPCS

## 2023-02-17 PROCEDURE — 85027 COMPLETE CBC AUTOMATED: CPT

## 2023-02-17 PROCEDURE — 6370000000 HC RX 637 (ALT 250 FOR IP)

## 2023-02-17 PROCEDURE — G0378 HOSPITAL OBSERVATION PER HR: HCPCS

## 2023-02-17 PROCEDURE — 93005 ELECTROCARDIOGRAM TRACING: CPT | Performed by: INTERNAL MEDICINE

## 2023-02-17 PROCEDURE — 2580000003 HC RX 258: Performed by: PHYSICIAN ASSISTANT

## 2023-02-17 PROCEDURE — C9600 PERC DRUG-EL COR STENT SING: HCPCS

## 2023-02-17 PROCEDURE — 99232 SBSQ HOSP IP/OBS MODERATE 35: CPT | Performed by: PHYSICIAN ASSISTANT

## 2023-02-17 RX ORDER — SODIUM CHLORIDE 9 MG/ML
INJECTION, SOLUTION INTRAVENOUS CONTINUOUS
Status: DISCONTINUED | OUTPATIENT
Start: 2023-02-17 | End: 2023-02-18 | Stop reason: HOSPADM

## 2023-02-17 RX ORDER — SODIUM CHLORIDE 9 MG/ML
INJECTION, SOLUTION INTRAVENOUS PRN
Status: DISCONTINUED | OUTPATIENT
Start: 2023-02-17 | End: 2023-02-18 | Stop reason: HOSPADM

## 2023-02-17 RX ORDER — SODIUM CHLORIDE 0.9 % (FLUSH) 0.9 %
5-40 SYRINGE (ML) INJECTION PRN
Status: DISCONTINUED | OUTPATIENT
Start: 2023-02-17 | End: 2023-02-18 | Stop reason: HOSPADM

## 2023-02-17 RX ORDER — ACETAMINOPHEN 325 MG/1
650 TABLET ORAL EVERY 4 HOURS PRN
Status: DISCONTINUED | OUTPATIENT
Start: 2023-02-17 | End: 2023-02-18 | Stop reason: HOSPADM

## 2023-02-17 RX ORDER — SODIUM CHLORIDE 0.9 % (FLUSH) 0.9 %
5-40 SYRINGE (ML) INJECTION EVERY 12 HOURS SCHEDULED
Status: DISCONTINUED | OUTPATIENT
Start: 2023-02-17 | End: 2023-02-18 | Stop reason: HOSPADM

## 2023-02-17 RX ADMIN — LEFLUNOMIDE 20 MG: 10 TABLET ORAL at 08:13

## 2023-02-17 RX ADMIN — LISINOPRIL 20 MG: 20 TABLET ORAL at 08:13

## 2023-02-17 RX ADMIN — ASPIRIN 81 MG: 81 TABLET, COATED ORAL at 08:13

## 2023-02-17 RX ADMIN — SODIUM CHLORIDE, PRESERVATIVE FREE 10 ML: 5 INJECTION INTRAVENOUS at 20:03

## 2023-02-17 RX ADMIN — PANTOPRAZOLE SODIUM 40 MG: 40 TABLET, DELAYED RELEASE ORAL at 05:05

## 2023-02-17 RX ADMIN — TICAGRELOR 90 MG: 90 TABLET ORAL at 20:03

## 2023-02-17 RX ADMIN — SODIUM CHLORIDE, PRESERVATIVE FREE 10 ML: 5 INJECTION INTRAVENOUS at 08:13

## 2023-02-17 RX ADMIN — METOPROLOL SUCCINATE 50 MG: 50 TABLET, EXTENDED RELEASE ORAL at 08:13

## 2023-02-17 RX ADMIN — ISOSORBIDE MONONITRATE 30 MG: 30 TABLET, EXTENDED RELEASE ORAL at 08:13

## 2023-02-17 RX ADMIN — ATORVASTATIN CALCIUM 40 MG: 40 TABLET, FILM COATED ORAL at 20:03

## 2023-02-17 RX ADMIN — IOPAMIDOL 160 ML: 755 INJECTION, SOLUTION INTRAVENOUS at 11:55

## 2023-02-17 RX ADMIN — PREDNISONE 5 MG: 5 TABLET ORAL at 08:14

## 2023-02-17 ASSESSMENT — PAIN SCALES - GENERAL
PAINLEVEL_OUTOF10: 0

## 2023-02-17 NOTE — PROCEDURES
[address] [place] [address]                            CARDIAC CATHETERIZATION    PATIENT NAME: AMAYA BEARD                   :        1940  MED REC NO:   [id number]                           ROOM:       0020  ACCOUNT NO:   [de-identified]                           ADMIT DATE: 2023  PROVIDER:     Naveen Blackburn MD    DATE OF PROCEDURE:  2023    INDICATION:  Acute coronary syndrome/unstable angina. PROCEDURES PERFORMED:  1. Left cardiac catheterization with selective coronary angiogram.  2.  Left ventriculogram.  3.  Successful PCI and stenting of mid LAD bifurcation lesion as well as  the second diagonal branch. 4.  Successful PCI and stenting of the first diagonal branch. DESCRIPTION OF PROCEDURE/INTERVENTION DETAILS:  After informed consent,  the patient was brought to the cardiac catheterization room. He was  prepped and draped in a sterile fashion. 2% lidocaine was injected in  the skin and subcutaneous tissue overlying the right radial artery. Under ultrasound guidance using modified Seldinger technique, access was  obtained in the right radial artery. 5/6 Slender sheath was inserted. Standard antithrombotic/antispasmodic medications were given. I used  6-Mozambican JR-4, 6-Mozambican JL-3.5 diagnostic catheters to complete the  coronary angiogram and cardiac catheterization procedure. Heparin was  given. ACT was confirmed to be above 250.  6-Mozambican EBU 3.5 guide  catheter was used to cannulate the left main coronary artery. Runthrough wire was utilized to cross the lesion and wire into the  second diagonal branch. Fielder wire was used to cross the LAD lesion  and wire into the LAD. I then placed a 3.0 x 12 mm PTCA balloon on the  LAD wire. The predilation angioplasty of the mid LAD was completed  using the 3.0 x 12 mm PTCA balloon. I then proceeded with stenting of  the second diagonal branch.   Efren Lakewood 2.0 x 22 mm drug-eluting  stent was successfully deployed covering the full length of the stenotic  segment of D2 including the ostium. Post dilation using same stent  balloon was completed. I then removed the wire from the side branch,  the second diagonal branch. The 3.0 x 12 mm PTCA balloon was used for  further angioplasty in the mid LAD including the bifurcation segment. Afterwards I proceeded with stenting of mid LAD. Alexandria Lakewood 3.5 x 26  mm drug-eluting stent was successfully deployed. This was postdilated  using a 3.5 x 12 mm noncompliant balloon. I then was able to rewire  through the stent struts back into the second diagonal branch. 1.5 mm  pressure push-up balloon was utilized to open the stent struts and do  angioplasty in the ostium of D2. I then performed kissing balloon  angioplasty where 3.5 x 8 mm noncompliant balloon was utilized for the  LAD and a 2.0 x 12 mm balloon was utilized for the ostium of the D2. Kissing balloon angioplasty was completed. Balloons were removed. The  repeat angiogram revealed well-expanded stents, ALEXANDER-3 flow. No  complications including no dissection, distal embolization or  perforation. 0% residual stenosis in target lesion areas in both mid  LAD and D2. I then redirected the [facility] wire to the first diagonal  branch. Predilation angioplasty using a 2.0 x 6 mm PTCA balloon was  completed. I then proceeded with stenting of D2 using a 2.5 mm x 22 mm  Efren Lakewood drug-eluting stent postdilated using same stent balloon. Wires were removed. The final angiogram revealed well-expanded stents,  ALEXANDER-3 flow. No complications including no dissection, distal  embolization or perforation. FINDINGS:  HEMODYNAMICS:  Left ventricular end-diastolic pressure 12 mmHg. No significant  pressure gradient across the aortic valve with mean gradient of 14 mmHg. LEFT VENTRICULOGRAM:  No regional wall motion abnormality.   Ejection  fraction 55 to 60%.    CORONARY ANGIOGRAM:  1. Right coronary artery, dominant vessel. Proximal segment has 10%  stenosis. Mid segment has segmental lesions 10 to 40% in severity. Distal segment has 20% stenosis. PDA is patent. PLB is patent. 2.  Left main coronary artery, relatively short vessel, gives rise to  left circumflex and left anterior descending arteries. 3.  Left circumflex artery, proximal segment has 50% diffuse disease  with moderate calcification. Stent in mid segment is patent, otherwise  no significant stenotic lesions were noticed. 4.  Left anterior descending artery, proximal segment is patent. D1 is  relatively large vessel with 90% stenosis. D2 is an intermediate size  vessel with an ostial 70 to 80% stenosis. Mid segment of LAD just at  the level of D2 has a severe 99% subtotal occlusion with ALEXANDER-2 flow. This is a bifurcation lesion Layne Ahr type 1, 1, 1. The distal LAD is  tortuous with luminal irregularities noted. MEDICATIONS:  See MAR. COMPLICATIONS:  None. ESTIMATED BLOOD LOSS:  Less than 50 mL. ACCESS:  Right radial artery access. Vasc Band was applied. Hemostasis  was achieved. IMPRESSION:  1. Acute coronary syndrome/unstable angina. 2.  Subtotal occlusion, 99% stenosis of the mid LAD with severe stenosis  of the second diagonal branch, status post successful PCI and stenting  of both the mid segment of LAD and D2. Mini crush technique was  utilized. 3.  Severely stenotic lesion involving the first diagonal branch, status  post successful PCI and stenting. RECOMMENDATIONS AND PLAN OF CARE:  The patient will be monitored on  telemetry. Continue inpatient care at this time. Aggressive risk  factor modification. Optimize medical therapy for coronary artery  disease. Aspirin 81 mg p.o. daily, Brilinta 90 mg p.o. b.i.d. High  intensity statin therapy. Refer the patient to cardiac rehab. Start IV  fluids. Check BMP, CBC and lipid panel in the morning.   Will need  outpatient followup in office within two weeks post discharge.         Melissa Olson MD    D: 02/17/2023 12:25:58       T: 02/17/2023 12:30:49     [id number]  Job#: [id number]     Doc#: [id number]    CC:

## 2023-02-17 NOTE — BRIEF OP NOTE
[address] [address]  Sedation/Analgesia Post Sedation Record    Pt Name: Welford Aschoff  Account number: [de-identified]  MRN: [id number]  YOB: 1940  Procedure Performed By: Buckley Sacks, MD MD   Primary Care Physician: Rose Mary Denis DO  Date: 2/17/2023    POST-PROCEDURE    Physicians/Assistants: Buckley Sacks, MD MD     Procedure Performed:Cath/ PCI      Sedation/Anesthesia: Versed/ Fentanyl and 2% xylocaine local anesthesia. Estimated Blood Loss: < 50 ml. Specimens Removed: None         Disposition of Specimen: N/A        Complications: No Immediate Complications. Post-procedure Diagnosis/Findings:       ACS/Unstable Angina   Subtotal occlusion, 99% stenosis, of mid LAD (bifurcation lesion), severe stenosis of the second diagonal branch (D2). S/p successful PCI/ALDO of both LAD and D2 (Min-Crush Technique)   Severe stenosis of the first diagonal branch, s/p successful PCI/ALDO    Recommendations:  Bed rest for 2 hours post procedure   Monitor on Telemetry   Optimize medical therapy for Coronary Artery Disease  Aggressive risk factor modification   Access site care  Antiplatelet therapy: ASA 81 mg PO daily and Brilinta 90 mg po BID   High intensity statin therapy  Refer to cardiac rehab   Repeat EKG on the floor   IVF: NS at 100 cc/h  AM Labs: BMP, CBC, Lipid panel  Outpatient follow up in office in 2 weeks      Above findings and plan of care were discussed with patient and his family, questions were answered, agreeable with plan.        Buckley Sacks, MD, Rula Scott   Electronically signed 2/17/2023 at 12:04 PM  Interventional Cardiology

## 2023-02-17 NOTE — CONSULTS
[facility]    Patient's Name/Date of Birth: Welford Aschoff / 1940 (69 y.o.)    Date: February 17, 2023     Referring Provider: KIM Smith    CHIEF COMPLAINT: Chest pain      HPI: This is a pleasant 82 y.o. male HLD, HTN, s/p carotid stent placement X2, CAD PCI in 2004 presented to [facility] with chief complaint of chest pain and shortness of breath. Cardiology was consulted for this chest pain. Patient states he has intermittent chest pain and shortness of breath which primarily occurs with exertion. He states that the pain is substernal and dull in nature. Relieved with rest.  Denies any palpitations, radiation, PND, orthopnea. He was recently seen by his cardiologist to ordered an outpatient stress test or echocardiogram which was scheduled be done in March. However, patient states that his symptoms are becoming more frequent and severe. So he decided to seek treatment. Last echo done yesterday 2/16/2023 shows normal left ventricular size and systolic function PF EF estimated to be 55%. There were no regional left ventricular motion abnormalities and wall thickness was within normal limits. EKG shows normal sinus rhythm.          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     Past Surgical History:   Procedure Laterality Date    ABSCESS DRAINAGE      SPIDER BITE    BRONCHOSCOPY N/A 10/22/2021    BRONCHOSCOPY W/EBUS FNA performed by Con Pelaez MD at [address] [place] [address]  2004    x2    COLONOSCOPY      CT BONE MARROW BIOPSY  09/29/2021    CT BONE MARROW BIOPSY 9/29/2021 [facility] CT SCAN    HERNIA REPAIR  05/18/2016     Current Facility-Administered Medications   Medication Dose Route Frequency Provider Last Rate Last Admin    sodium chloride flush 0.9 % injection 5-40 mL  5-40 mL IntraVENous 2 times per day Luis F Lamb PA-C   10 mL at 02/16/23 1937    sodium chloride flush 0.9 % injection 5-40 mL  5-40 mL IntraVENous PRN Luis F Lamb PA-C        0.9 % sodium chloride infusion   IntraVENous PRN Fred OhlemacONEL torres        ondansetron (ZOFRAN-ODT) disintegrating tablet 4 mg  4 mg Oral Q8H PRN Fred OhlemacONEL torres        Or    ondansetron (ZOFRAN) injection 4 mg  4 mg IntraVENous Q6H PRN Fred OhlemacONEL torres        acetaminophen (TYLENOL) tablet 650 mg  650 mg Oral Q6H PRN Fred OhlemacONEL torres        Or    acetaminophen (TYLENOL) suppository 650 mg  650 mg Rectal Q6H PRN Fred OhlemacONEL torres        polyethylene glycol (GLYCOLAX) packet 17 g  17 g Oral Daily PRN Fred OhlemacONEL torres        enoxaparin (LOVENOX) injection 40 mg  40 mg SubCUTAneous Q24H Fred OhlemacONEL torres   40 mg at 02/16/23 1407    aspirin EC tablet 81 mg  81 mg Oral Daily Fred OhlemacONEL torres        isosorbide mononitrate (IMDUR) extended release tablet 30 mg  30 mg Oral Daily Fred OhlemacherONEL        lisinopril (PRINIVIL;ZESTRIL) tablet 20 mg  20 mg Oral Daily Fred OhlemacherONEL        metoprolol succinate (TOPROL XL) extended release tablet 50 mg  50 mg Oral Daily Fred OhlemacherONEL        pantoprazole (PROTONIX) tablet 40 mg  40 mg Oral QAM AC Fred OhlemacONEL torres   40 mg at 02/17/23 0505    atorvastatin (LIPITOR) tablet 40 mg  40 mg Oral Nightly Fred OhlemacONEL torres   40 mg at 02/16/23 1937    predniSONE (DELTASONE) tablet 5 mg  5 mg Oral Daily Fred OhlemacONEL torres   5 mg at 02/16/23 1534    leflunomide (ARAVA) tablet 20 mg  20 mg Oral Daily Fred Ohlemacher, PA-C   20 mg at 02/16/23 1534     Prior to Admission medications    Medication Sig Start Date End Date Taking?  Authorizing Provider   omeprazole ([facility]) 20 MG delayed release capsule Take 20 mg by mouth daily    Historical Provider, MD   isosorbide mononitrate (IMDUR) 30 MG extended release tablet Take 1 tablet by mouth daily 1/27/23   JENNIFER Trent CNP   predniSONE (DELTASONE) 5 MG tablet Take 1 tablet by mouth daily 1/16/23 2/15/23  Celia Claude, MD   leflunomide (ARAVA) 20 MG tablet Take 1 tablet by mouth daily 1/16/23 2/15/23  Celia Claude, MD   pravastatin (PRAVACHOL) 80 MG tablet Take 1 tablet by mouth daily 10/10/22   Jordin Saravia MD   lisinopril (PRINIVIL;ZESTRIL) 20 MG tablet Take 1 tablet by mouth daily 10/10/22   Jordin Saravia MD   metoprolol succinate (TOPROL XL) 50 MG extended release tablet Take 1 tablet by mouth daily 10/10/22   Nuris Perrin MD   LORATADINE ALLERGY RELIEF PO Take by mouth    Historical Provider, MD   acetaminophen (TYLENOL) 325 MG tablet Take 650 mg by mouth every 6 hours as needed for Pain    Historical Provider, MD   Multiple Vitamins-Minerals (THERAPEUTIC MULTIVITAMIN-MINERALS) tablet Take 1 tablet by mouth daily    Historical Provider, MD   aspirin 81 MG tablet Take 81 mg by mouth daily.     Historical Provider, MD   Scheduled Meds:   sodium chloride flush  5-40 mL IntraVENous 2 times per day    enoxaparin  40 mg SubCUTAneous Q24H    aspirin  81 mg Oral Daily    isosorbide mononitrate  30 mg Oral Daily    lisinopril  20 mg Oral Daily    metoprolol succinate  50 mg Oral Daily    pantoprazole  40 mg Oral QAM AC    atorvastatin  40 mg Oral Nightly    predniSONE  5 mg Oral Daily    leflunomide  20 mg Oral Daily     Continuous Infusions:   sodium chloride       PRN Meds:.sodium chloride flush, sodium chloride, ondansetron **OR** ondansetron, acetaminophen **OR** acetaminophen, polyethylene glycol    Allergies   Allergen Reactions    No Known Allergies      Family History   Problem Relation Age of Onset    Arthritis Mother     Heart Disease Father      Social History     Socioeconomic History    Marital status:      Spouse name: Olvin Anderson    Number of children: 1    Years of education: Not on file    Highest education level: Not on file   Occupational History    Not on file   Tobacco Use    Smoking status: Former     Packs/day: 1.00     Years: 37.00     Pack years: 37.00     Types: Cigarettes     Start date: 1964     Quit date: 2001     Years since quittin.4    Smokeless tobacco: Never   Vaping Use    Vaping Use: Never used   Substance and Sexual Activity    Alcohol use: Not Currently     Comment: beer every so often     Drug use: Never    Sexual activity: Not on file   Other Topics Concern    Not on file   Social History Narrative    Not on file     Social Determinants of Health     Financial Resource Strain: Not on file   Food Insecurity: Not on file   Transportation Needs: Not on file   Physical Activity: Not on file   Stress: Not on file   Social Connections: Not on file   Intimate Partner Violence: Not on file   Housing Stability: Not on file     ROS:   Constitutional: Denies any recent wt change. Eyes:  Denies any blurring or double vision, no glaucoma  Ears/Nose/Mouth/Throat:  Denies any chronic sinus/rhinitis, bleeding gums  Cardiovascular:  As described above. Respiratory:  Denies any frequent cough, wheezing or coughing up blood  Genitourinary:  Denies difficulty with urination and kidney stones  Gastrointestinal:  Denies any chronic problems with abdominal pain, nausea, vomiting or diarrhea  Musculoskeletal:  Denies any joint pain, back pain, or difficulty walking  Integumentary:  Denies any rash  Neurological:  No numbness or tingling  Endocrine:  Denies any polydipsia. Hematologic/Lymphatic:  Denies any hemorrhage or lymphatic drainage problems.   Labs:  CBC:   Recent Labs     23  1055 23  0557   WBC 6.2 4.3*   HGB 11.4* 12.3*   HCT 36.4* 37.7*   MCV 97.6* 94.5*    202     BMP:   Recent Labs     23  1055 23  0557    142   K 4.2 4.3    105   CO2 24 25   BUN 18 17   CREATININE 1.3* 1.3*     Accucheck Glucoses: No results for input(s): POCGLU in the last 72 hours. Cardiac Enzymes: No results for input(s): CKTOTAL, CKMB, CKMBINDEX, TROPONINI in the last 72 hours. PT/INR: No results for input(s): PROTIME, INR in the last 72 hours. APTT: No results for input(s): APTT in the last 72 hours. Liver Profile:  Lab Results   Component Value Date/Time    AST 15 01/10/2023 11:17 AM    ALT 12 01/10/2023 11:17 AM    BILIDIR <0.2 10/12/2022 08:50 AM    BILITOT 0.5 01/10/2023 11:17 AM    ALKPHOS 76 01/10/2023 11:17 AM   No results found for: CHOL, HDL, TRIG  TSH: No results found for: TSH  UA: No results found for: NITRITE, COLORU, PHUR, LABCAST, WBCUA, RBCUA, MUCUS, TRICHOMONAS, YEAST, BACTERIA, CLARITYU, SPECGRAV, LEUKOCYTESUR, UROBILINOGEN, BILIRUBINUR, BLOODU, GLUCOSEU, AMORPHOUS      Physical Exam:  Vitals:    02/17/23 0500   BP: 137/70   Pulse: 66   Resp: 16   Temp: 97.6 °F (36.4 °C)   SpO2: 96%      Intake/Output Summary (Last 24 hours) at 2/17/2023 4561  Last data filed at 2/16/2023 1753  Gross per 24 hour   Intake 450 ml   Output --   Net 450 ml      General:  No acute distress  Neck: Supple, no JVD, no carotid bruits  Heart: Regular rhythm normal S1 and S2, no rubs, murmurs or gallops  Lungs: clear to ascultation no rales, wheezes, or rhonchi  Abdomen: positive bowel sounds, soft, non-tender, non-distended, no bruits, no masses  Extremities:no clubbing, cyanosis or edema  Neurologic: alert and oriented x 3, cranial nerves 2-12 grossly intact, motor and sensory intact, moving all extremities  Skin: No rashes  Psych: AO x 3, no depression/hugo, no pressured speech, normal affect  Lymph: No obvious LAD    Work-up:  Echo report from 2/16/2023  Summary   Normal left ventricle size and systolic function. Ejection fraction was   estimated at 55 %.  There were no regional left ventricular wall motion   abnormalities and wall thickness was within normal limits. Doppler parameters were consistent with abnormal left ventricular   relaxation (grade 1 diastolic dysfunction). Mild aortic regurgitation is noted. EKG from 2/16/2023  Normal sinus rhythm  Normal ECG  When compared with ECG of 16-FEB-2023 10:31,  ST no longer depressed in Inferior leads  ST no longer depressed in Anterior leads      Assessment:    Unstable angina  HLD  HTN  CKD  Inflammatory arthritis    Plan:  -Scheduled for left heart cath    Thank you for allowing us to participate in the care of this patient. Please do not hesitate to call us with questions. Electronically signed by Brendia Dance, DO on 2/17/2023 at 8:14 AM      I have seen and examined the patient independently. . Face to face evaluation and examination performed. The above evaluation and note has been reviewed and Changes made as necessary. Labs, EKG, echo, and radiographs reviewed. I Have discussed with Brendia Dance, DO about this patient in detail   D/w Patient's R.N. and specific instructions given and cardiovascular disease issues addressed. I above assessment and plan has been reviewed and modified per my assessment as necessary and I agree with it. Unstable angina- Crescendo Angina in the last 1 month progressively worse  HLD  HTN  CKD III     Plan:  Pat need cardiac cath in view of the crescendo angina in the last 1 month    The risk and benefit of left heart cath has been explain in detail including but not limited to  Bleeding including retroperitoneal bleed 1%, infection, MI, CVA, SONAM, Limb loss, dissection, allergic reaction,death  Each of them 1 in 2000 range. The alternative managment has been explained. Patient expressed understanding of the risk and benefit and of the alternative managment well. Patient wanted and agreed to proceed with left heart cath.   Hence we will schedule him for [facility]        Echo  Optimal medical therapy  Cont asa/ lipitor , metoprolol and lisinopril  Prehydration    Will follow    Thank you for allowing me to participate in the care of your patient.  Please don't hesitate to contact me regarding any further issues related to the patient care        Farris MD Kalin, MD,FACC  Cardiology - [facility]

## 2023-02-17 NOTE — CARE COORDINATION
Case Management Assessment  Initial Evaluation    Date/Time of Evaluation: 2/17/2023 10:14 AM  Assessment Completed by: Osman Mack RN    If patient is discharged prior to next notation, then this note serves as note for discharge by case management. Patient Name: Josefina Lemus                   YOB: 1940  Diagnosis: Chest pain [R07.9]  Stable angina pectoris ([address] [I20.8]                   Date / Time: 2/16/2023 10:27 AM  Location: [facility]20/020A     Patient Admission Status: Observation   Readmission Risk Low 0-14, Mod 15-19), High > 20: No data recorded  Current PCP: Shay Bergeron, DO  PCP verified by CM? Yes    Chart Reviewed: Yes      History Provided by: Patient  Patient Orientation: Alert and Oriented    Patient Cognition: Alert    Hospitalization in the last 30 days (Readmission):  No    If yes, Readmission Assessment in CM Navigator will be completed. Advance Directives:      Code Status: Full Code   Patient's Primary Decision Maker is: Legal Next of Kin      Discharge Planning:    Patient lives with: Spouse/Significant Other Type of Home: House  Primary Care Giver: Self  Patient Support Systems include: Spouse/Significant Other   Current Financial resources: Medicare  Current community resources: None  Current services prior to admission: None            Current DME:              Type of Home Care services:  None    ADLS  Prior functional level: Independent in ADLs/IADLs  Current functional level: Independent in ADLs/IADLs    Family can provide assistance at DC: Yes  Would you like Case Management to discuss the discharge plan with any other family members/significant others, and if so, who?  No  Plans to Return to Present Housing: Yes  Other Identified Issues/Barriers to RETURNING to current housing: no  Potential Assistance needed at discharge: N/A            Potential DME:    Patient expects to discharge to: [address] [place] [address] for transportation at discharge: Self    Financial    Payor: MEDICARE / Plan: MEDICARE PART A AND B / Product Type: *No Product type* /     Does insurance require precert for SNF: No    Potential assistance Purchasing Medications: No  Meds-to-Beds request: Yes      [address]  [address] [facility]  Phone: [phone] Fax: [phone]      Notes:    Factors facilitating achievement of predicted outcomes: Family support, Motivated, Cooperative, and Pleasant    Barriers to discharge: Medical complications    Additional Case Management Notes: Admit from ER with chest pain. Consult to cardiology, awaiting their input on stress test vs cardiac cath. - Troponin x 3. Procedure: 2/16 CXR: Normal heart size. Evidence for old, healed granulomatous disease. No acute findings. No infiltrates or effusions are seen. The Plan for Transition of Care is related to the following treatment goals of Chest pain [R07.9]  Stable angina pectoris ([address] [I20.8]    Patient Goals/Plan/Treatment Preferences: From home with wife. Independent in care. Drives. Denies any home going needs. Transportation/Food Security/Housekeeping Addressed: No issues identified. Jamee Kaba RN  Case Management Department        Potential discharge later today or over the weekend pending cardiology's plan. 2/17/23, 10:15 AM EST    Patient goals/plan/ treatment preferences discussed by  and . Patient goals/plan/ treatment preferences reviewed with patient/ family. Patient/ family verbalize understanding of discharge plan and are in agreement with goal/plan/treatment preferences. Understanding was demonstrated using the teach back method. AVS provided by RN at time of discharge, which includes all necessary medical information pertaining to the patients current course of illness, treatment, post-discharge goals of care, and treatment preferences. Services At/After Discharge: None       IMM Letter  Observation Status Letter date given[de-identified] 02/16/23  Observation Status Letter time given[de-identified] 0635  Observation Status Letter given to Patient/Family/Significant other/Guardian/POA/by[de-identified] patient registration

## 2023-02-17 NOTE — PROGRESS NOTES
Inpatient Cardiac Rehabilitation Consult    Received consult for Phase II Cardiac Rehabilitation. Patient needs cardiac rehab due to PCI on 2/17/2023. Importance of Cardiac Rehab discussed with patient. Patient questions answered. Afshan Mccabe would like to participate in rehab at [facility] [place]. Will send info to them. Cardiac Rehab brochure given.

## 2023-02-17 NOTE — H&P
[facility]  Sedation/Analgesia History & Physical    Pt Name: Khoa Watkins  Account number: [de-identified]  MRN: [id number]  YOB: 1940  Provider Performing Procedure: Krystle Crawford MD MD  Referring Provider: KIM Salazar   Primary Care Physician: Nena Morrison DO  Date: 2/17/2023    PRE-PROCEDURE    Code Status: FULL CODE  Brief History/Pre-Procedure Diagnosis:    Unstable angina  Consent: : I have discussed with the patient risks, benefits, and alternatives (and relevant risks, benefits, and side effects related to alternatives or not receiving care), and likelihood of the success. The patient and/or representative appear to understand and agree to proceed. The discussion encompasses risks, benefits, and side effects related to the alternatives and the risks related to not receiving the proposed care, treatment, and services. The indication, risks and benefits of the procedure and possible therapeutic consequences and alternatives were discussed with the patient. The patient was given the opportunity to ask questions and to have them answered to his/her satisfaction. Risks of the procedure include but are not limited to the following: Bleeding, hematoma including retroperitoneal hemmorhage, infection, pain and discomfort, injury to the aorta and other blood vessels, rhythm disturbance, low blood pressure, myocardial infarction, stroke, kidney damage/failure, myocardial perforation, allergic reactions to sedatives/contrast material, loss of pulse/vascular compromise requiring surgery, aneurysm/pseudoaneurysm formation, possible loss of a limb/hand/leg, needing blood transfusion, requiring emergent open heart surgery or emergent coronary intervention, the need for intubation/respiratory support, the requirement for defibrillation/cardioversion, and death. Alternatives to and omission of the suggested procedure were discussed.  The patient had no further questions and wished to proceed; the consent form was signed. MEDICAL HISTORY  []ASHD/ANGINA/MI/CHF   []Hypertension  []Diabetes  []Hyperlipidemia  []Smoking  []Family Hx of ASHD  []Additional information:       has a past medical history of CAD (coronary artery disease), COVID-19, Enlarged lymph nodes, Heart disease, Hyperlipidemia, Hypertension, and Plasmacytoma ([address] SURGICAL HISTORY   has a past surgical history that includes Abscess Drainage; Colonoscopy; hernia repair (05/18/2016); Carotid stent placement (2004); CT BIOPSY BONE MARROW (09/29/2021); and bronchoscopy (N/A, 10/22/2021).   Additional information:       ALLERGIES   Allergies as of 02/16/2023 - Fully Reviewed 02/16/2023   Allergen Reaction Noted    No known allergies  05/13/2022     Additional information:       MEDICATIONS   Aspirin  [] 81 mg  [] 325 mg  [] None  Antiplatelet drug therapy use last 5 days  []No []Yes  Coumadin Use Last 5 Days []No []Yes  Other anticoagulant use last 5 days  []No []Yes    Current Facility-Administered Medications:     sodium chloride flush 0.9 % injection 5-40 mL, 5-40 mL, IntraVENous, 2 times per day, Karma Drain, PA-C, 10 mL at 02/17/23 0813    sodium chloride flush 0.9 % injection 5-40 mL, 5-40 mL, IntraVENous, PRN, Fred Ohlemacher, PA-C    0.9 % sodium chloride infusion, , IntraVENous, PRN, Fred Ohlemacher, PA-C    ondansetron (ZOFRAN-ODT) disintegrating tablet 4 mg, 4 mg, Oral, Q8H PRN **OR** ondansetron (ZOFRAN) injection 4 mg, 4 mg, IntraVENous, Q6H PRN, Fred Ohlemacher, PA-C    acetaminophen (TYLENOL) tablet 650 mg, 650 mg, Oral, Q6H PRN **OR** acetaminophen (TYLENOL) suppository 650 mg, 650 mg, Rectal, Q6H PRN, Fred Ohlemacher, PA-C    polyethylene glycol (GLYCOLAX) packet 17 g, 17 g, Oral, Daily PRN, Fred Ohlemacher, PA-C    enoxaparin (LOVENOX) injection 40 mg, 40 mg, SubCUTAneous, Q24H, Fred Chan PA-C, 40 mg at 02/16/23 1407    aspirin EC tablet 81 mg, 81 mg, Oral, Daily, KIM Jones-C, 81 mg at 02/17/23 0813    isosorbide mononitrate (IMDUR) extended release tablet 30 mg, 30 mg, Oral, Daily, Fred Chan PA-C, 30 mg at 02/17/23 0813    lisinopril (PRINIVIL;ZESTRIL) tablet 20 mg, 20 mg, Oral, Daily, Fred Chan PA-C, 20 mg at 02/17/23 0813    metoprolol succinate (TOPROL XL) extended release tablet 50 mg, 50 mg, Oral, Daily, Fred Chan PA-C, 50 mg at 02/17/23 0813    pantoprazole (PROTONIX) tablet 40 mg, 40 mg, Oral, QAM AC, Fred Chan, PA-C, 40 mg at 02/17/23 0505    atorvastatin (LIPITOR) tablet 40 mg, 40 mg, Oral, Nightly, Fred Chan PA-C, 40 mg at 02/16/23 1937    predniSONE (DELTASONE) tablet 5 mg, 5 mg, Oral, Daily, Fred Chan, PA-C, 5 mg at 02/17/23 5558    leflunomide (ARAVA) tablet 20 mg, 20 mg, Oral, Daily, Fred Chan PA-C, 20 mg at 02/17/23 0813  Prior to Admission medications    Medication Sig Start Date End Date Taking?  Authorizing Provider   omeprazole (PRILOSEC) 20 MG delayed release capsule Take 20 mg by mouth daily    Historical Provider, MD   isosorbide mononitrate (IMDUR) 30 MG extended release tablet Take 1 tablet by mouth daily 1/27/23   JENNIFER Mendez - CNP   predniSONE (DELTASONE) 5 MG tablet Take 1 tablet by mouth daily 1/16/23 2/15/23  Guanako Gaxiola MD   leflunomide (ARAVA) 20 MG tablet Take 1 tablet by mouth daily 1/16/23 2/15/23  Guanako Gaxiola MD   pravastatin (PRAVACHOL) 80 MG tablet Take 1 tablet by mouth daily 10/10/22   Shira Berger MD   lisinopril (PRINIVIL;ZESTRIL) 20 MG tablet Take 1 tablet by mouth daily 10/10/22   Shira Berger MD   metoprolol succinate (TOPROL XL) 50 MG extended release tablet Take 1 tablet by mouth daily 10/10/22   Nuris Singer MD   LORATADINE ALLERGY RELIEF PO Take by mouth    Historical Provider, MD   acetaminophen (TYLENOL) 325 MG tablet Take 650 mg by mouth every 6 hours as needed for Pain    Historical Provider, MD   Multiple Vitamins-Minerals (THERAPEUTIC MULTIVITAMIN-MINERALS) tablet Take 1 tablet by mouth daily    Historical Provider, MD   aspirin 81 MG tablet Take 81 mg by mouth daily. Historical Provider, MD     Additional information:       VITAL SIGNS   Vitals:    02/17/23 0813   BP: (!) 155/85   Pulse: 68   Resp: 16   Temp: 97.9 °F (36.6 °C)   SpO2: 98%       PHYSICAL:   General: No acute distress  HEENT:  Unremarkable for age  Neck: without increased JVD, carotid pulses 2+ bilaterally without bruits  Heart: RRR, S1 & S2 WNL. No murmurs    Lungs: Clear to auscultation    Abdomen: BS present, without HSM, masses, or tenderness    Extremities: without C,C,E.  Pulses 2+ bilaterally   Mental Status: Alert & Oriented        PLANNED PROCEDURE   [x]Cath  [x]PCI                []Pacemaker/AICD  []KARINE             []Cardioversion []Peripheral angiography/PTA  []Other:      SEDATION  Planned agent:[x]Midazolam []Meperidine []Sublimaze []Morphine  []Diazepam  []Other:     ASA Classification:  []1 []2 [x]3 []4 []5   Class 1: A normal healthy patient  Class 2: Pt with mild to moderate systemic disease  Class 3: Severe systemic disease or disturbance  Class 4: Severe systemic disorders that are already life threatening. Class 5: Moribund pt with little chances of survival, for more than 24 hours. Mallampati I Airway Classification:   []1 []2 [x]3 []4     [x]Pre-procedure diagnostic studies complete and results available. Comment:    [x]Previous sedation/anesthesia experiences assessed. Comment:    [x]The patient is an appropriate candidate to undergo the planned procedure sedation and anesthesia. (Refer to nursing sedation/analgesia documentation record)  [x]Formulation and discussion of sedation/procedure plan, risks, and expectations with patient and/or responsible adult completed. [x]Patient examined immediately prior to the procedure.  (Refer to nursing sedation/analgesia documentation record)      Janes Raman MD, [facility] Electronically signed 2/17/2023 at 10:30 AM

## 2023-02-17 NOTE — PROGRESS NOTES
Hospitalist Progress Note      Patient:  Pasha Saavedra    Unit/Bed:8A-20/020-A  YOB: 1940  MRN: [id number]   Acct: [de-identified]   PCP: Sylvester Fleischer Hotmire, DO  Date of Admission: 2/16/2023    Assessment/Plan:    Unstable Angina: Cardiology consulted. Plan for [facility] today. Pt denies chest pain resting but states that it worsens with activity. Echo showed 45% EF and G1DD. History of coronary artery disease: Patient with history of PCI in 2004. Continue antiplatelets, aggressive risk factor control  CKD stage 3: Creatinine stable at 1.3. Monitor. Hyperlipidemia: Statin  Hypertension: BP stable. Continue home regimen  History of plasmacytoma: Follows with Dr. Leopold Coats  Inflammatory arthritis: Sherl Laws, prednisone    Chief Complaint: Chest Pain     Initial H and P:-    Initial H&P \"Bruce Vallejo is a 82 y.o. male with a history of coronary artery disease, hyperlipidemia, hypertension, plasmacytoma, and inflammatory Tritus who presented to [facility] with chief complaint of chest pain and shortness of breath. The patient states over the recent weeks he has been having intermittent chest shortness of breath. This primarily occurs with exertion. The pain is substernal and dull in nature. It comes on with exertion and is relieved with rest.  It is associated with shortness of breath. There is no associated palpitations, radiation, paroxysmal nocturnal dyspnea, or orthopnea. The patient does have a history of coronary artery disease and underwent PCI in 2004. He has no history of congestive heart failure. He recently was seen by his cardiologist who ordered an outpatient stress test or echocardiogram which was scheduled to be done in March. However, the patient's symptoms became more frequent and severe, so he decided to seek treatment. He denies any other symptoms at this time. \"     Subjective (past 24 hours):   No acute events overnight.  Pt resting in bed with no issues. Pt states that he does not have chest pain at rest. Pt understands that he might need a heart cath. Past medical history, family history, social history and allergies reviewed again and is unchanged since admission. ROS (All review of systems completed. Pertinent positives noted. Otherwise All other systems reviewed and negative.)     Medications:  Reviewed    Infusion Medications    sodium chloride       Scheduled Medications    sodium chloride flush  5-40 mL IntraVENous 2 times per day    enoxaparin  40 mg SubCUTAneous Q24H    aspirin  81 mg Oral Daily    isosorbide mononitrate  30 mg Oral Daily    lisinopril  20 mg Oral Daily    metoprolol succinate  50 mg Oral Daily    pantoprazole  40 mg Oral QAM AC    atorvastatin  40 mg Oral Nightly    predniSONE  5 mg Oral Daily    leflunomide  20 mg Oral Daily     PRN Meds: sodium chloride flush, sodium chloride, ondansetron **OR** ondansetron, acetaminophen **OR** acetaminophen, polyethylene glycol      Intake/Output Summary (Last 24 hours) at 2/17/2023 1104  Last data filed at 2/17/2023 0813  Gross per 24 hour   Intake 460 ml   Output --   Net 460 ml       Diet:  Diet NPO Exceptions are: Sips of Water with Meds    Physical Exam:  BP (!) 155/85   Pulse 68   Temp 97.9 °F (36.6 °C) (Oral)   Resp 16   Ht 5' 10\" (1.778 m)   Wt 194 lb 10.7 oz (88.3 kg)   SpO2 98%   BMI 27.93 kg/m²   General appearance: No apparent distress, appears stated age and cooperative. HEENT: Pupils equal, round, and reactive to light. Conjunctivae/corneas clear. Neck: Supple, with full range of motion. No jugular venous distention. Trachea midline. Respiratory:  Normal respiratory effort. Clear to auscultation, bilaterally without Rales/Wheezes/Rhonchi. Cardiovascular: Regular rate and rhythm with normal S1/S2 without murmurs, rubs or gallops. Abdomen: Soft, non-tender, non-distended with normal bowel sounds.   Musculoskeletal: passive and active ROM x 4 extremities. Skin: Skin color, texture, turgor normal.  No rashes or lesions. Neurologic:  Neurovascularly intact without any focal sensory/motor deficits. Cranial nerves: II-XII intact, grossly non-focal.  Psychiatric: Alert and oriented, thought content appropriate, normal insight  Capillary Refill: Brisk,< 3 seconds   Peripheral Pulses: +2 palpable, equal bilaterally     Labs:   Recent Labs     02/16/23  1055 02/17/23  0557   WBC 6.2 4.3*   HGB 11.4* 12.3*   HCT 36.4* 37.7*    202     Recent Labs     02/16/23  1055 02/17/23  0557    142   K 4.2 4.3    105   CO2 24 25   BUN 18 17   CREATININE 1.3* 1.3*   CALCIUM 9.6 9.7     No results for input(s): AST, ALT, BILIDIR, BILITOT, ALKPHOS in the last 72 hours. No results for input(s): INR in the last 72 hours. No results for input(s): Muriel Shanks in the last 72 hours. Microbiology:    Blood culture #1: No results found for: BC    Blood culture #2:No results found for: BLOODCULT2    Organism:  Lab Results   Component Value Date/Time    ORG Serratia liquefaciens group 10/22/2021 02:00 PM    ORG Aspergillus species 10/22/2021 02:00 PM         Lab Results   Component Value Date/Time    LABGRAM  10/22/2021 02:00 PM     Rare segmented neutrophils observed. Rare epithelial cells observed. No organisms observed. MRSA culture only:No results found for: [address] [place] [address]    Urine culture: No results found for: LABURIN    Respiratory culture: No results found for: CULTRESP    Aerobic and Anaerobic :  No results found for: LABAERO  No results found for: LABANAE    Urinalysis:    No results found for: Shashi Americo, BACTERIA, RBCUA, BLOODU, SPECGRAV, GLUCOSEU    Radiology:  XR CHEST PORTABLE   Final Result   1. Normal heart size. Evidence for old, healed granulomatous disease. 2. No acute findings. No infiltrates or effusions are seen. **This report has been created using voice recognition software.   It may contain minor errors which are inherent in voice recognition technology. **      Final report electronically signed by Dr. Eb Maldonado on 2/16/2023 11:28 AM        XR CHEST PORTABLE    Result Date: 2/16/2023  PROCEDURE: XR CHEST PORTABLE CLINICAL INFORMATION: CP & SOB on exertion COMPARISON: 10/22/2021 TECHNIQUE: A single mobile view of the chest was obtained. 1. Normal heart size. Evidence for old, healed granulomatous disease. 2. No acute findings. No infiltrates or effusions are seen. **This report has been created using voice recognition software. It may contain minor errors which are inherent in voice recognition technology. ** Final report electronically signed by Dr. Eb Maldonado on 2/16/2023 11:28 AM      Electronically signed by KIM Stark on 2/17/2023 at 11:04 AM

## 2023-02-17 NOTE — PLAN OF CARE
Problem: Discharge Planning  Goal: Discharge to home or other facility with appropriate resources  Outcome: Progressing  Flowsheets  Taken 2/17/2023 0246 by Kilo Anthony RN  Discharge to home or other facility with appropriate resources:   Identify barriers to discharge with patient and caregiver   Arrange for needed discharge resources and transportation as appropriate  Taken 2/16/2023 1335 by Teresita Healy RN  Discharge to home or other facility with appropriate resources: Identify barriers to discharge with patient and caregiver     Problem: Safety - Adult  Goal: Free from fall injury  Outcome: Progressing  Flowsheets (Taken 2/17/2023 0246)  Free From Fall Injury:   Instruct family/caregiver on patient safety   Based on caregiver fall risk screen, instruct family/caregiver to ask for assistance with transferring infant if caregiver noted to have fall risk factors     Problem: Pain  Goal: Verbalizes/displays adequate comfort level or baseline comfort level  Outcome: Progressing  Flowsheets  Taken 2/17/2023 0246  Verbalizes/displays adequate comfort level or baseline comfort level:   Encourage patient to monitor pain and request assistance   Assess pain using appropriate pain scale  Taken 2/16/2023 2345  Verbalizes/displays adequate comfort level or baseline comfort level: Encourage patient to monitor pain and request assistance  Taken 2/16/2023 1930  Verbalizes/displays adequate comfort level or baseline comfort level: Encourage patient to monitor pain and request assistance

## 2023-02-18 VITALS
BODY MASS INDEX: 27.84 KG/M2 | SYSTOLIC BLOOD PRESSURE: 156 MMHG | HEIGHT: 70 IN | TEMPERATURE: 98.2 F | WEIGHT: 194.45 LBS | HEART RATE: 77 BPM | RESPIRATION RATE: 16 BRPM | DIASTOLIC BLOOD PRESSURE: 83 MMHG | OXYGEN SATURATION: 97 %

## 2023-02-18 LAB
ANION GAP SERPL CALC-SCNC: 13 MEQ/L (ref 8–16)
BUN SERPL-MCNC: 19 MG/DL (ref 7–22)
CALCIUM SERPL-MCNC: 9.7 MG/DL (ref 8.5–10.5)
CHLORIDE SERPL-SCNC: 105 MEQ/L (ref 98–111)
CHOLEST SERPL-MCNC: 133 MG/DL (ref 100–199)
CO2 SERPL-SCNC: 24 MEQ/L (ref 23–33)
CREAT SERPL-MCNC: 1.3 MG/DL (ref 0.4–1.2)
DEPRECATED RDW RBC AUTO: 53.4 FL (ref 35–45)
ERYTHROCYTE [DISTWIDTH] IN BLOOD BY AUTOMATED COUNT: 15.5 % (ref 11.5–14.5)
GFR SERPL CREATININE-BSD FRML MDRD: 55 ML/MIN/1.73M2
GLUCOSE SERPL-MCNC: 98 MG/DL (ref 70–108)
HCT VFR BLD AUTO: 38.6 % (ref 42–52)
HDLC SERPL-MCNC: 37 MG/DL
HGB BLD-MCNC: 12.5 GM/DL (ref 14–18)
LDLC SERPL CALC-MCNC: 70 MG/DL
MCH RBC QN AUTO: 30.6 PG (ref 26–33)
MCHC RBC AUTO-ENTMCNC: 32.4 GM/DL (ref 32.2–35.5)
MCV RBC AUTO: 94.6 FL (ref 80–94)
PLATELET # BLD AUTO: 193 THOU/MM3 (ref 130–400)
PMV BLD AUTO: 10.1 FL (ref 9.4–12.4)
POTASSIUM SERPL-SCNC: 4.7 MEQ/L (ref 3.5–5.2)
RBC # BLD AUTO: 4.08 MILL/MM3 (ref 4.7–6.1)
SODIUM SERPL-SCNC: 142 MEQ/L (ref 135–145)
TRIGL SERPL-MCNC: 132 MG/DL (ref 0–199)
WBC # BLD AUTO: 6 THOU/MM3 (ref 4.8–10.8)

## 2023-02-18 PROCEDURE — G0378 HOSPITAL OBSERVATION PER HR: HCPCS

## 2023-02-18 PROCEDURE — 2580000003 HC RX 258: Performed by: INTERNAL MEDICINE

## 2023-02-18 PROCEDURE — 36415 COLL VENOUS BLD VENIPUNCTURE: CPT

## 2023-02-18 PROCEDURE — 80048 BASIC METABOLIC PNL TOTAL CA: CPT

## 2023-02-18 PROCEDURE — 85027 COMPLETE CBC AUTOMATED: CPT

## 2023-02-18 PROCEDURE — 99239 HOSP IP/OBS DSCHRG MGMT >30: CPT | Performed by: PHYSICIAN ASSISTANT

## 2023-02-18 PROCEDURE — 6370000000 HC RX 637 (ALT 250 FOR IP): Performed by: INTERNAL MEDICINE

## 2023-02-18 PROCEDURE — 99232 SBSQ HOSP IP/OBS MODERATE 35: CPT | Performed by: STUDENT IN AN ORGANIZED HEALTH CARE EDUCATION/TRAINING PROGRAM

## 2023-02-18 PROCEDURE — 80061 LIPID PANEL: CPT

## 2023-02-18 PROCEDURE — 6370000000 HC RX 637 (ALT 250 FOR IP): Performed by: PHYSICIAN ASSISTANT

## 2023-02-18 RX ORDER — ATORVASTATIN CALCIUM 40 MG/1
40 TABLET, FILM COATED ORAL NIGHTLY
Qty: 30 TABLET | Refills: 3 | Status: SHIPPED | OUTPATIENT
Start: 2023-02-18

## 2023-02-18 RX ORDER — NITROGLYCERIN 0.4 MG/1
0.4 TABLET SUBLINGUAL EVERY 5 MIN PRN
Qty: 25 TABLET | Refills: 3 | Status: SHIPPED | OUTPATIENT
Start: 2023-02-18

## 2023-02-18 RX ADMIN — TICAGRELOR 90 MG: 90 TABLET ORAL at 09:06

## 2023-02-18 RX ADMIN — LISINOPRIL 20 MG: 20 TABLET ORAL at 09:07

## 2023-02-18 RX ADMIN — LEFLUNOMIDE 20 MG: 10 TABLET ORAL at 09:07

## 2023-02-18 RX ADMIN — SODIUM CHLORIDE, PRESERVATIVE FREE 10 ML: 5 INJECTION INTRAVENOUS at 09:08

## 2023-02-18 RX ADMIN — METOPROLOL SUCCINATE 50 MG: 50 TABLET, EXTENDED RELEASE ORAL at 09:07

## 2023-02-18 RX ADMIN — PANTOPRAZOLE SODIUM 40 MG: 40 TABLET, DELAYED RELEASE ORAL at 05:27

## 2023-02-18 RX ADMIN — PREDNISONE 5 MG: 5 TABLET ORAL at 09:07

## 2023-02-18 RX ADMIN — ASPIRIN 81 MG: 81 TABLET, COATED ORAL at 09:07

## 2023-02-18 RX ADMIN — ISOSORBIDE MONONITRATE 30 MG: 30 TABLET, EXTENDED RELEASE ORAL at 09:07

## 2023-02-18 NOTE — PROGRESS NOTES
Iv and tele removed from pt. Avs reviewed with pt. Pt denies questions. Pt's wife called for transport. Pt discharged without incident.

## 2023-02-18 NOTE — CARE COORDINATION
2/18/23, 10:50 AM EST  Home with wife today; denied needs. Patient goals/plan/ treatment preferences discussed by  and . Patient goals/plan/ treatment preferences reviewed with patient/ family. Patient/ family verbalize understanding of discharge plan and are in agreement with goal/plan/treatment preferences. Understanding was demonstrated using the teach back method. AVS provided by RN at time of discharge, which includes all necessary medical information pertaining to the patients current course of illness, treatment, post-discharge goals of care, and treatment preferences.      Services At/After Discharge: None       IMM Letter  Observation Status Letter date given[de-identified] 02/16/23  Observation Status Letter time given[de-identified] 8474  Observation Status Letter given to Patient/Family/Significant other/Guardian/POA/by[de-identified] patient registration

## 2023-02-18 NOTE — DISCHARGE INSTRUCTIONS
F/u with Dr Rupali Klein as scheduled. Do not stop taking brilinta or aspirin without talking to your doctor. Discharge Instructions for Radial Heart Catherization    1. Take it easy for 3-4 days. 2.  No driving for 2 days. 3.  No lifting of 5 lbs or more for 5 days with the affected arm. 4.  Can shower after 24 hours. 5.  Remove arm board after 24 hours. 6.  Apply a band aid to the insertion site daily for 5 days. May apply antibiotic ointment if desired, but not necessary. Wash site daily with soap and water. 7.  No creams, ointments, or powders near the insertion site. 8.  No tub baths, swimming, hot tubs, or hand washing dishes for 1 week. 9.  Watch for signs of infection (redness, warmth, swelling, or pus drainage) or coolness of extremity and call physician if this occurs  10. If bleeding occurs from insertion site, apply pressure and call 911.

## 2023-02-18 NOTE — PROGRESS NOTES
Cardiology Progress Note      Patient:  Mike Campos  YOB: 1940  MRN: [id number]   Acct: [de-identified]  Admit Date:  2/16/2023  Primary Cardiologist:  Robert Romero  Note per DR David Splinter:  \"CHIEF COMPLAINT: Chest pain        HPI: This is a pleasant 82 y.o. male HLD, HTN, s/p carotid stent placement X2, CAD PCI in 2004 presented to [address] [place] [address] with chief complaint of chest pain and shortness of breath. Cardiology was consulted for this chest pain. Patient states he has intermittent chest pain and shortness of breath which primarily occurs with exertion. He states that the pain is substernal and dull in nature. Relieved with rest.  Denies any palpitations, radiation, PND, orthopnea. He was recently seen by his cardiologist to ordered an outpatient stress test or echocardiogram which was scheduled be done in March. However, patient states that his symptoms are becoming more frequent and severe. So he decided to seek treatment. Last echo done yesterday 2/16/2023 shows normal left ventricular size and systolic function PF EF estimated to be 55%. There were no regional left ventricular motion abnormalities and wall thickness was within normal limits. EKG shows normal sinus rhythm. All labs, EKG's, diagnostic testing and images as well as cardiac cath, stress testing were reviewed during this encounter\"    Subjective (Events in last 24 hours):   Pt awake, alert. NAD. Denies cp or sob. D/w patient about importance of brilinta and asa for new heart stents. He has been on other cardiac meds for a while. Cardiac rehab. F/u with John Paul as scheduled.      Objective:   /82   Pulse 78   Temp 98.2 °F (36.8 °C) (Oral)   Resp 17   Ht 5' 10\" (1.778 m)   Wt 194 lb 7.1 oz (88.2 kg)   SpO2 97%   BMI 27.90 kg/m²      vss  TELEMETRY: nsr    Physical Exam:  General Appearance: alert and oriented to person, place and time, in no acute distress  Cardiovascular: normal rate, regular rhythm, normal S1 and S2, no murmurs, rubs, clicks, or gallops, distal pulses intact, no carotid bruits, no JVD  Pulmonary/Chest: clear to auscultation bilaterally- no wheezes, rales or rhonchi, normal air movement, no respiratory distress  Abdomen: soft, non-tender, non-distended, normal bowel sounds, no masses   Extremities: no cyanosis, clubbing or edema, pulse   Skin: warm and dry, no rash or erythema  Neurological: alert, oriented, normal speech, no focal findings or movement disorder noted  Right radial-no ecchymosis, nontender, no edema, N VI   Medications:    sodium chloride flush  5-40 mL IntraVENous 2 times per day    ticagrelor  90 mg Oral BID    sodium chloride flush  5-40 mL IntraVENous 2 times per day    [Held by provider] enoxaparin  40 mg SubCUTAneous Q24H    aspirin  81 mg Oral Daily    isosorbide mononitrate  30 mg Oral Daily    lisinopril  20 mg Oral Daily    metoprolol succinate  50 mg Oral Daily    pantoprazole  40 mg Oral QAM AC    atorvastatin  40 mg Oral Nightly    predniSONE  5 mg Oral Daily    leflunomide  20 mg Oral Daily      sodium chloride      sodium chloride      sodium chloride       sodium chloride flush, 5-40 mL, PRN  sodium chloride, , PRN  acetaminophen, 650 mg, Q4H PRN  sodium chloride flush, 5-40 mL, PRN  sodium chloride, , PRN  ondansetron, 4 mg, Q8H PRN   Or  ondansetron, 4 mg, Q6H PRN  acetaminophen, 650 mg, Q6H PRN   Or  acetaminophen, 650 mg, Q6H PRN  polyethylene glycol, 17 g, Daily PRN        Diagnostics:  EKG:     Echo:   Conclusions      Summary   Normal left ventricle size and systolic function. Ejection fraction was   estimated at 55 %. There were no regional left ventricular wall motion   abnormalities and wall thickness was within normal limits. Doppler parameters were consistent with abnormal left ventricular   relaxation (grade 1 diastolic dysfunction). Mild aortic regurgitation is noted.       Signature      ---------------------------------------------------------------- Electronically signed by Luisito Coronado MD (Interpreting   physician) on 02/16/2023 at 09:07 PM   ----------------------------------------------------------------      Findings      Mitral Valve   The mitral valve structure was normal with normal leaflet separation. DOPPLER: The transmitral velocity was within the normal range with no   evidence for mitral stenosis. There was no evidence of mitral   regurgitation. Aortic Valve   The aortic valve was trileaflet with normal thickness and cuspal   separation. DOPPLER: Transaortic velocity was within the normal range with   no evidence of aortic stenosis. Mild aortic regurgitation is noted. Tricuspid Valve   The tricuspid valve structure was normal with normal leaflet separation. DOPPLER: There was no evidence of tricuspid stenosis. There was no   evidence of tricuspid regurgitation. Pulmonic Valve   The pulmonic valve leaflets exhibited normal thickness, no calcification,   and normal cuspal separation. DOPPLER: The transpulmonic velocity was   within the normal range with no evidence for regurgitation. Left Atrium   Left atrial size was normal.      Left Ventricle   Normal left ventricle size and systolic function. Ejection fraction was   estimated at 55 %. There were no regional left ventricular wall motion   abnormalities and wall thickness was within normal limits. Doppler parameters were consistent with abnormal left ventricular   relaxation (grade 1 diastolic dysfunction). Right Atrium   Right atrial size was normal.      Right Ventricle   The right ventricular size was normal with normal systolic function and   wall thickness. Pericardial Effusion   The pericardium was normal in appearance with no evidence of a pericardial   effusion. Pleural Effusion   No evidence of pleural effusion. Aorta / Great Vessels   -Aortic root dimension within normal limits.   -The Pulmonary artery is within normal limits.    -IVC size is within normal limits with normal respiratory phasic changes. Stress:     Left Heart Cath:   FINDINGS:  HEMODYNAMICS:  Left ventricular end-diastolic pressure 12 mmHg. No significant  pressure gradient across the aortic valve with mean gradient of 14 mmHg. LEFT VENTRICULOGRAM:  No regional wall motion abnormality. Ejection  fraction 55 to 60%. CORONARY ANGIOGRAM:  1. Right coronary artery, dominant vessel. Proximal segment has 10%  stenosis. Mid segment has segmental lesions 10 to 40% in severity. Distal segment has 20% stenosis. PDA is patent. PLB is patent. 2.  Left main coronary artery, relatively short vessel, gives rise to  left circumflex and left anterior descending arteries. 3.  Left circumflex artery, proximal segment has 50% diffuse disease  with moderate calcification. Stent in mid segment is patent, otherwise  no significant stenotic lesions were noticed. 4.  Left anterior descending artery, proximal segment is patent. D1 is  relatively large vessel with 90% stenosis. D2 is an intermediate size  vessel with an ostial 70 to 80% stenosis. Mid segment of LAD just at  the level of D2 has a severe 99% subtotal occlusion with ALEXANDER-2 flow. This is a bifurcation lesion Conner Martinez type 1, 1, 1. The distal LAD is  tortuous with luminal irregularities noted. MEDICATIONS:  See MAR. COMPLICATIONS:  None. ESTIMATED BLOOD LOSS:  Less than 50 mL. ACCESS:  Right radial artery access. Vasc Band was applied. Hemostasis  was achieved. IMPRESSION:  1. Acute coronary syndrome/unstable angina. 2.  Subtotal occlusion, 99% stenosis of the mid LAD with severe stenosis  of the second diagonal branch, status post successful PCI and stenting  of both the mid segment of LAD and D2. Mini crush technique was  utilized. 3.  Severely stenotic lesion involving the first diagonal branch, status  post successful PCI and stenting.      RECOMMENDATIONS AND PLAN OF CARE:  The patient will be monitored on  telemetry. Continue inpatient care at this time. Aggressive risk  factor modification. Optimize medical therapy for coronary artery  disease. Aspirin 81 mg p.o. daily, Brilinta 90 mg p.o. b.i.d. High  intensity statin therapy. Refer the patient to cardiac rehab. Start IV  fluids. Check BMP, CBC and lipid panel in the morning. Will need  outpatient followup in office within two weeks post discharge. Evon Jay MD     D: 02/17/2023  Lab Data:    Cardiac Enzymes:  No results for input(s): CKTOTAL, CKMB, CKMBINDEX, TROPONINI in the last 72 hours.     CBC:   Lab Results   Component Value Date/Time    WBC 4.3 02/17/2023 05:57 AM    RBC 3.99 02/17/2023 05:57 AM    HGB 12.3 02/17/2023 05:57 AM    HCT 37.7 02/17/2023 05:57 AM     02/17/2023 05:57 AM       CMP:    Lab Results   Component Value Date/Time     02/17/2023 05:57 AM    K 4.3 02/17/2023 05:57 AM     02/17/2023 05:57 AM    CO2 25 02/17/2023 05:57 AM    BUN 17 02/17/2023 05:57 AM    CREATININE 1.3 02/17/2023 05:57 AM    LABGLOM 55 02/17/2023 05:57 AM    GLUCOSE 96 02/17/2023 05:57 AM    CALCIUM 9.7 02/17/2023 05:57 AM       Hepatic Function Panel:    Lab Results   Component Value Date/Time    ALKPHOS 76 01/10/2023 11:17 AM    ALT 12 01/10/2023 11:17 AM    AST 15 01/10/2023 11:17 AM    PROT 6.3 01/10/2023 11:17 AM    BILITOT 0.5 01/10/2023 11:17 AM    BILIDIR <0.2 10/12/2022 08:50 AM    LABALBU 3.8 01/10/2023 11:17 AM       Magnesium:  No results found for: MG    PT/INR:  No results found for: PROTIME, INR    HgBA1c:  No results found for: LABA1C    FLP:    Lab Results   Component Value Date/Time    TRIG 132 02/18/2023 05:46 AM    HDL 37 02/18/2023 05:46 AM    LDLCALC 70 02/18/2023 05:46 AM       TSH:  No results found for: TSH      Assessment:  USA-improved   S/p LHC and PCI to   Hx of CAD/pCI  Seen recently by primary cardio and was scheduled for stress test.   HTN  HLD  Arthritis    Plan:  ACS Guidelines  ASA/Plavix/brilinta-yes, ASA/brilinta  Statin- Yes. Lipitor 40 daily  BB-yes. Toprol 50 daily. ACE/ARB-yes. Lisinopril 20 mg daily. Repatha-?   Cardiac Rehab-ordered. SNOW acosta at d/c. Patient and Practitioner mutually agreed upon goal:   Patient and provider goals: Feel better and have more energy and Start exercise program      F/u with Dr. Royal Arguello as scheduled. Okay to d/c from cardiac standpoint if cbc and bmp are stable.    Electronically signed by Rae Marin PA-C on 2/18/2023 at 8:04 AM

## 2023-02-18 NOTE — DISCHARGE SUMMARY
Hospitalist Discharge Summary        Patient: Barbara Sweeney  YOB: 1940  MRN: [id number]   Acct: [de-identified]    Primary Care Physician: Henrietta Montes DO    Admit date  2/16/2023    Discharge date: 2/18/2023    Chief Complaint on presentation :-  Chest Pain     Discharge Assessment and Plan:-   Unstable Angina: Cardiology consulted. LHC showed artery stenosis, stent placed. Echo showed 45% EF and G1DD. Margene Fluke started. ASA. Pt received a [employer] card to help with cost.   ACS Guidelines  ASA/Plavix/brilinta-yes, ASA/brilinta  Statin- Yes. Lipitor 40 daily  BB-yes. Toprol 50 daily. ACE/ARB-yes. Lisinopril 20 mg daily. Repatha-?   Cardiac Rehab-ordered. SL nitro at d/c. History of coronary artery disease: Patient with history of PCI in 2004. Continue antiplatelets, aggressive risk factor control  CKD stage 3: Creatinine stable at 1.3. Hyperlipidemia: Statin  Hypertension: BP stable. 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 male with a history of coronary artery disease, hyperlipidemia, hypertension, plasmacytoma, and inflammatory Tritus who presented to [facility] with chief complaint of chest pain and shortness of breath. The patient states over the recent weeks he has been having intermittent chest shortness of breath. This primarily occurs with exertion. The pain is substernal and dull in nature. It comes on with exertion and is relieved with rest.  It is associated with shortness of breath. There is no associated palpitations, radiation, paroxysmal nocturnal dyspnea, or orthopnea. The patient does have a history of coronary artery disease and underwent PCI in 2004. He has no history of congestive heart failure.   He recently was seen by his cardiologist who ordered an outpatient stress test or echocardiogram which was scheduled to be done in March. However, the patient's symptoms became more frequent and severe, so he decided to seek treatment. He denies any other symptoms at this time. \"     2/17: No acute events overnight. Pt resting in bed with no issues. Pt states that he does not have chest pain at rest. Pt understands that he might need a heart cath. 2/18: Cardiology okay with DC. Pt received tracx card for cost. Pt has cardiac rehab set up. On the day of discharge, it was explained to the patient that it was very important to follow up with his PCP and Cardiology  to have continued care. Appointments were made and information was given. Physical Exam:-  Vitals:   Patient Vitals for the past 24 hrs:   BP Temp Temp src Pulse Resp SpO2 Weight   02/18/23 1121 (!) 156/83 98.2 °F (36.8 °C) Oral 77 16 97 % --   02/18/23 0902 (!) 157/78 98.1 °F (36.7 °C) Oral 85 16 95 % --   02/18/23 0624 -- -- -- -- -- -- 194 lb 7.1 oz (88.2 kg)   02/18/23 0415 119/82 98.2 °F (36.8 °C) Oral 78 17 97 % --   02/17/23 2330 134/85 98.1 °F (36.7 °C) Oral 74 18 98 % --   02/17/23 1945 (!) 135/91 98.2 °F (36.8 °C) Oral 74 16 99 % --   02/17/23 1800 134/76 -- -- 76 16 98 % --   02/17/23 1700 119/72 -- -- 83 16 98 % --   02/17/23 1600 111/79 -- -- 81 16 98 % --   02/17/23 1500 132/78 -- -- 88 16 98 % --   02/17/23 1441 128/71 98.6 °F (37 °C) Oral 80 16 97 % --   02/17/23 1400 133/71 -- -- 80 16 99 % --   02/17/23 1330 (!) 142/74 -- -- 78 16 99 % --   02/17/23 1300 134/67 -- -- 70 16 96 % --     Weight:   Weight: 194 lb 7.1 oz (88.2 kg)   24 hour intake/output:     Intake/Output Summary (Last 24 hours) at 2/18/2023 1257  Last data filed at 2/17/2023 2110  Gross per 24 hour   Intake 500 ml   Output --   Net 500 ml       General appearance: No apparent distress, appears stated age and cooperative. HEENT: Normal cephalic, atraumatic without obvious deformity. Pupils equal, round, and reactive to light. Extra ocular muscles intact. Conjunctivae/corneas clear. Neck: Supple, with full range of motion. No jugular venous distention. Trachea midline. Respiratory:  Normal respiratory effort. Clear to auscultation, bilaterally without Rales/Wheezes/Rhonchi. Cardiovascular: Regular rate and rhythm with normal S1/S2 without murmurs, rubs or gallops. Abdomen: Soft, non-tender, non-distended with normal bowel sounds. Musculoskeletal:  No clubbing, cyanosis or edema bilaterally. Skin: Skin color, texture, turgor normal.  No rashes or lesions. Neurologic:  Neurovascularly intact without any focal sensory/motor deficits. Cranial nerves: II-XII intact, grossly non-focal.  Psychiatric: Alert and oriented, thought content appropriate, normal insight  Capillary Refill: Brisk,< 3 seconds   Peripheral Pulses: +2 palpable, equal bilaterally       Discharge Medications:-      Medication List        START taking these medications      atorvastatin 40 MG tablet  Commonly known as: LIPITOR  Take 1 tablet by mouth nightly     nitroGLYCERIN 0.4 MG SL tablet  Commonly known as: Nitrostat  Place 1 tablet under the tongue every 5 minutes as needed for Chest pain up to max of 3 total doses. If no relief after 1 dose, call 911.      ticagrelor 90 MG Tabs tablet  Commonly known as: BRILINTA  Take 1 tablet by mouth 2 times daily            CONTINUE taking these medications      acetaminophen 325 MG tablet  Commonly known as: TYLENOL     aspirin 81 MG tablet     isosorbide mononitrate 30 MG extended release tablet  Commonly known as: IMDUR  Take 1 tablet by mouth daily     leflunomide 20 MG tablet  Commonly known as: Arava  Take 1 tablet by mouth daily     lisinopril 20 MG tablet  Commonly known as: PRINIVIL;ZESTRIL  Take 1 tablet by mouth daily     LORATADINE ALLERGY RELIEF PO     metoprolol succinate 50 MG extended release tablet  Commonly known as: TOPROL XL  Take 1 tablet by mouth daily     omeprazole 20 MG delayed release capsule  Commonly known as: PRILOSEC predniSONE 5 MG tablet  Commonly known as: DELTASONE  Take 1 tablet by mouth daily     therapeutic multivitamin-minerals tablet            STOP taking these medications      pravastatin 80 MG tablet  Commonly known as: PRAVACHOL               Where to Get Your Medications        These medications were sent to [address] [facility] [phone]  [address]      Phone: [phone]   atorvastatin 40 MG tablet  nitroGLYCERIN 0.4 MG SL tablet  ticagrelor 90 MG Tabs tablet          Labs :-  Recent Results (from the past 72 hour(s))   EKG 12 Lead    Collection Time: 02/16/23 10:31 AM   Result Value Ref Range    Ventricular Rate 82 BPM    Atrial Rate 82 BPM    P-R Interval 184 ms    QRS Duration 68 ms    Q-T Interval 364 ms    QTc Calculation (Bazett) 425 ms    P Axis 53 degrees    R Axis 20 degrees    T Axis -22 degrees   BMP    Collection Time: 02/16/23 10:55 AM   Result Value Ref Range    Sodium 143 135 - 145 meq/L    Potassium 4.2 3.5 - 5.2 meq/L    Chloride 105 98 - 111 meq/L    CO2 24 23 - 33 meq/L    Glucose 126 (H) 70 - 108 mg/dL    BUN 18 7 - 22 mg/dL    Creatinine 1.3 (H) 0.4 - 1.2 mg/dL    Calcium 9.6 8.5 - 10.5 mg/dL   CBC with Auto Differential    Collection Time: 02/16/23 10:55 AM   Result Value Ref Range    WBC 6.2 4.8 - 10.8 thou/mm3    RBC 3.73 (L) 4.70 - 6.10 mill/mm3    Hemoglobin 11.4 (L) 14.0 - 18.0 gm/dl    Hematocrit 36.4 (L) 42.0 - 52.0 %    MCV 97.6 (H) 80.0 - 94.0 fL    MCH 30.6 26.0 - 33.0 pg    MCHC 31.3 (L) 32.2 - 35.5 gm/dl    RDW-CV 15.7 (H) 11.5 - 14.5 %    RDW-SD 56.2 (H) 35.0 - 45.0 fL    Platelets 815 704 - 387 thou/mm3    MPV 10.2 9.4 - 12.4 fL    Seg Neutrophils 80.5 %    Lymphocytes 9.5 %    Monocytes 8.1 %    Eosinophils 0.8 %    Basophils 0.3 %    Immature Granulocytes 0.8 %    Segs Absolute 5.0 1.8 - 7.7 thou/mm3    Lymphocytes Absolute 0.6 (L) 1.0 - 4.8 thou/mm3    Monocytes Absolute 0.5 0.4 - 1.3 thou/mm3    Eosinophils Absolute 0.0 0.0 - 0.4 thou/mm3    Basophils Absolute 0.0 0.0 - 0.1 thou/mm3    Immature Grans (Abs) 0.05 0.00 - 0.07 thou/mm3    nRBC 0 /100 wbc   Troponin    Collection Time: 02/16/23 10:55 AM   Result Value Ref Range    Troponin T < 0.010 ng/ml   Brain Natriuretic Peptide    Collection Time: 02/16/23 10:55 AM   Result Value Ref Range    Pro-.9 0.0 - 449.0 pg/mL   Anion Gap    Collection Time: 02/16/23 10:55 AM   Result Value Ref Range    Anion Gap 14.0 8.0 - 16.0 meq/L   Glomerular Filtration Rate, Estimated    Collection Time: 02/16/23 10:55 AM   Result Value Ref Range    Est, Glom Filt Rate 55 (A) >60 ml/min/1.73m2   Osmolality    Collection Time: 02/16/23 10:55 AM   Result Value Ref Range    Osmolality Calc 288.4 275.0 - 300.0 mOsmol/kg   Troponin    Collection Time: 02/16/23  2:13 PM   Result Value Ref Range    Troponin T < 0.010 ng/ml   ECHO Complete 2D W Doppler W Color    Collection Time: 02/16/23  4:08 PM   Result Value Ref Range    Left Ventricular Ejection Fraction 55     LVEF MODALITY ECHO    Troponin    Collection Time: 02/16/23  5:03 PM   Result Value Ref Range    Troponin T < 0.010 ng/ml   CBC    Collection Time: 02/17/23  5:57 AM   Result Value Ref Range    WBC 4.3 (L) 4.8 - 10.8 thou/mm3    RBC 3.99 (L) 4.70 - 6.10 mill/mm3    Hemoglobin 12.3 (L) 14.0 - 18.0 gm/dl    Hematocrit 37.7 (L) 42.0 - 52.0 %    MCV 94.5 (H) 80.0 - 94.0 fL    MCH 30.8 26.0 - 33.0 pg    MCHC 32.6 32.2 - 35.5 gm/dl    RDW-CV 15.4 (H) 11.5 - 14.5 %    RDW-SD 53.4 (H) 35.0 - 45.0 fL    Platelets 091 200 - 076 thou/mm3    MPV 10.4 9.4 - 12.4 fL   Basic Metabolic Panel w/ Reflex to MG    Collection Time: 02/17/23  5:57 AM   Result Value Ref Range    Sodium 142 135 - 145 meq/L    Potassium reflex Magnesium 4.3 3.5 - 5.2 meq/L    Chloride 105 98 - 111 meq/L    CO2 25 23 - 33 meq/L    Glucose 96 70 - 108 mg/dL    BUN 17 7 - 22 mg/dL    Creatinine 1.3 (H) 0.4 - 1.2 mg/dL    Calcium 9.7 8.5 - 10.5 mg/dL Anion Gap    Collection Time: 02/17/23  5:57 AM   Result Value Ref Range    Anion Gap 12.0 8.0 - 16.0 meq/L   Glomerular Filtration Rate, Estimated    Collection Time: 02/17/23  5:57 AM   Result Value Ref Range    Est, Glom Filt Rate 55 (A) >60 ml/min/1.73m2   EKG 12 lead    Collection Time: 02/17/23  6:32 AM   Result Value Ref Range    Ventricular Rate 65 BPM    Atrial Rate 65 BPM    P-R Interval 180 ms    QRS Duration 82 ms    Q-T Interval 410 ms    QTc Calculation (Bazett) 426 ms    P Axis 36 degrees    R Axis 8 degrees    T Axis 9 degrees   POCT activated clotting time    Collection Time: 02/17/23 11:14 AM   Result Value Ref Range    Activated Clotting Time 269 (H) 1 - 150 seconds   EKG 12 lead    Collection Time: 02/17/23 12:53 PM   Result Value Ref Range    Ventricular Rate 69 BPM    Atrial Rate 69 BPM    P-R Interval 184 ms    QRS Duration 80 ms    Q-T Interval 408 ms    QTc Calculation (Bazett) 437 ms    P Axis 35 degrees    R Axis -7 degrees    T Axis 12 degrees   Lipid Panel    Collection Time: 02/18/23  5:46 AM   Result Value Ref Range    Cholesterol, Total 133 100 - 199 mg/dL    Triglycerides 132 0 - 199 mg/dL    HDL 37 mg/dL    LDL Calculated 70 mg/dL   Basic Metabolic Panel    Collection Time: 02/18/23  8:22 AM   Result Value Ref Range    Sodium 142 135 - 145 meq/L    Potassium 4.7 3.5 - 5.2 meq/L    Chloride 105 98 - 111 meq/L    CO2 24 23 - 33 meq/L    Glucose 98 70 - 108 mg/dL    BUN 19 7 - 22 mg/dL    Creatinine 1.3 (H) 0.4 - 1.2 mg/dL    Calcium 9.7 8.5 - 10.5 mg/dL   CBC    Collection Time: 02/18/23  8:22 AM   Result Value Ref Range    WBC 6.0 4.8 - 10.8 thou/mm3    RBC 4.08 (L) 4.70 - 6.10 mill/mm3    Hemoglobin 12.5 (L) 14.0 - 18.0 gm/dl    Hematocrit 38.6 (L) 42.0 - 52.0 %    MCV 94.6 (H) 80.0 - 94.0 fL    MCH 30.6 26.0 - 33.0 pg    MCHC 32.4 32.2 - 35.5 gm/dl    RDW-CV 15.5 (H) 11.5 - 14.5 %    RDW-SD 53.4 (H) 35.0 - 45.0 fL    Platelets 113 141 - 197 thou/mm3    MPV 10.1 9.4 - 12.4 fL Anion Gap    Collection Time: 02/18/23  8:22 AM   Result Value Ref Range    Anion Gap 13.0 8.0 - 16.0 meq/L   Glomerular Filtration Rate, Estimated    Collection Time: 02/18/23  8:22 AM   Result Value Ref Range    Est, Glom Filt Rate 55 (A) >60 ml/min/1.73m2        Microbiology:    Blood culture #1: No results found for: BC    Blood culture #2:No results found for: BLOODCULT2    Organism:    Lab Results   Component Value Date/Time    LABGRAM  10/22/2021 02:00 PM     Rare segmented neutrophils observed. Rare epithelial cells observed. No organisms observed. MRSA culture only:No results found for: [facility]    Urine culture: No results found for: LABURIN  Lab Results   Component Value Date/Time    ORG Serratia liquefaciens group 10/22/2021 02:00 PM    ORG Aspergillus species 10/22/2021 02:00 PM        Respiratory culture: No results found for: CULTRESP    Aerobic and Anaerobic :  No results found for: LABAERO  No results found for: LABANAE    Urinalysis:    No results found for: Rosalinda Midget, BACTERIA, RBCUA, BLOODU, [address], GLUCOSEU    Radiology:-  XR CHEST PORTABLE    Result Date: 2/16/2023  PROCEDURE: XR CHEST PORTABLE CLINICAL INFORMATION: CP & SOB on exertion COMPARISON: 10/22/2021 TECHNIQUE: A single mobile view of the chest was obtained. 1. Normal heart size. Evidence for old, healed granulomatous disease. 2. No acute findings. No infiltrates or effusions are seen. **This report has been created using voice recognition software. It may contain minor errors which are inherent in voice recognition technology. ** Final report electronically signed by Dr. Alexei Carroll on 2/16/2023 11:28 AM       Follow-up scheduled after discharge :-    in the next few days with Shay Bergeron, DO  in the next few weeks with Cardiology     Consultations during this hospital stay:-  [] NONE [x] Cardiology  [] Nephrology  [] Hemo onco   [] GI   [] ID  [] Endocrine  [] Pulm    [] Neuro    [] Psych   [] Urology  [] ENT [] G SURGERY   []Ortho    []CV surg    [] Palliative  [] Hospice [] Pain management   []    []TCU   [] PT/OT  OTHERS:-    Disposition: home  Condition at Discharge: Stable    Time Spent:- 40 minutes    Electronically signed by Elinor Fothergill, PA on 2/18/23 at 12:57 PM EST   Discharging Hospitalist

## 2023-02-27 LAB
ALBUMIN SERPL-MCNC: 4.4 G/DL
ALP BLD-CCNC: 82 U/L
ALT SERPL-CCNC: 11 U/L
ANION GAP SERPL CALCULATED.3IONS-SCNC: NORMAL MMOL/L
AST SERPL-CCNC: 15 U/L
BASOPHILS ABSOLUTE: 0.1 /ΜL
BASOPHILS RELATIVE PERCENT: 0.8 %
BILIRUB SERPL-MCNC: 0.7 MG/DL (ref 0.1–1.4)
BUN BLDV-MCNC: 20 MG/DL
C-REACTIVE PROTEIN: 2.28
CALCIUM SERPL-MCNC: 9.5 MG/DL
CHLORIDE BLD-SCNC: 104 MMOL/L
CO2: 28 MMOL/L
CREAT SERPL-MCNC: 1.4 MG/DL
EGFR: 50
EOSINOPHILS ABSOLUTE: 0.1 /ΜL
EOSINOPHILS RELATIVE PERCENT: 0.8 %
GLUCOSE BLD-MCNC: 94 MG/DL
HCT VFR BLD CALC: 35.6 % (ref 41–53)
HEMOGLOBIN: 11.9 G/DL (ref 13.5–17.5)
LYMPHOCYTES ABSOLUTE: 0.6 /ΜL
LYMPHOCYTES RELATIVE PERCENT: 9 %
MCH RBC QN AUTO: 30.7 PG
MCHC RBC AUTO-ENTMCNC: 33.3 G/DL
MCV RBC AUTO: 92.1 FL
MONOCYTES ABSOLUTE: 0.6 /ΜL
MONOCYTES RELATIVE PERCENT: 8.9 %
NEUTROPHILS ABSOLUTE: 5.5 /ΜL
NEUTROPHILS RELATIVE PERCENT: 80.5 %
PDW BLD-RTO: 15.2 %
PLATELET # BLD: 219 K/ΜL
PMV BLD AUTO: 8.3 FL
POTASSIUM SERPL-SCNC: 4.2 MMOL/L
RBC # BLD: 3.86 10^6/ΜL
SEDIMENTATION RATE, ERYTHROCYTE: 56
SODIUM BLD-SCNC: 140 MMOL/L
TOTAL PROTEIN: 7.5
WBC # BLD: 6.8 10^3/ML

## 2023-03-13 DIAGNOSIS — Z79.899 ENCOUNTER FOR LONG-TERM (CURRENT) USE OF HIGH-RISK MEDICATION: ICD-10-CM

## 2023-03-13 DIAGNOSIS — M19.90 INFLAMMATORY ARTHRITIS: ICD-10-CM

## 2023-03-13 RX ORDER — LEFLUNOMIDE 20 MG/1
TABLET ORAL
Qty: 30 TABLET | Refills: 2 | Status: SHIPPED | OUTPATIENT
Start: 2023-03-13

## 2023-03-16 ENCOUNTER — OFFICE VISIT (OUTPATIENT)
Dept: CARDIOLOGY CLINIC | Age: 83
End: 2023-03-16

## 2023-03-16 VITALS
DIASTOLIC BLOOD PRESSURE: 85 MMHG | HEIGHT: 70 IN | HEART RATE: 83 BPM | RESPIRATION RATE: 18 BRPM | BODY MASS INDEX: 27.35 KG/M2 | WEIGHT: 191 LBS | SYSTOLIC BLOOD PRESSURE: 129 MMHG

## 2023-03-16 DIAGNOSIS — I25.10 CORONARY ARTERY DISEASE INVOLVING NATIVE CORONARY ARTERY OF NATIVE HEART WITHOUT ANGINA PECTORIS: Primary | ICD-10-CM

## 2023-03-16 DIAGNOSIS — E78.5 DYSLIPIDEMIA (HIGH LDL; LOW HDL): ICD-10-CM

## 2023-03-16 RX ORDER — ATORVASTATIN CALCIUM 40 MG/1
40 TABLET, FILM COATED ORAL NIGHTLY
Qty: 90 TABLET | Refills: 3 | Status: SHIPPED | OUTPATIENT
Start: 2023-03-16

## 2023-03-16 RX ORDER — ISOSORBIDE MONONITRATE 30 MG/1
30 TABLET, EXTENDED RELEASE ORAL DAILY
Qty: 90 TABLET | Refills: 3 | Status: SHIPPED | OUTPATIENT
Start: 2023-03-16

## 2023-03-16 RX ORDER — CLOPIDOGREL BISULFATE 75 MG/1
75 TABLET ORAL DAILY
Qty: 90 TABLET | Refills: 3 | Status: SHIPPED | OUTPATIENT
Start: 2023-03-16

## 2023-03-16 ASSESSMENT — ENCOUNTER SYMPTOMS
NAUSEA: 0
ANAL BLEEDING: 0
COLOR CHANGE: 0
VOICE CHANGE: 0
ABDOMINAL PAIN: 0
VOMITING: 0
CHEST TIGHTNESS: 0
WHEEZING: 0
SHORTNESS OF BREATH: 0
COUGH: 0
STRIDOR: 0
BLOOD IN STOOL: 0
APNEA: 0
ABDOMINAL DISTENTION: 0
CHOKING: 0
TROUBLE SWALLOWING: 0

## 2023-03-16 NOTE — PROGRESS NOTES
[address]  Dept: [address]: [phone]     3/16/2023       Zacarias Rasheed is here today for   Chief Complaint   Patient presents with    Follow-up           Referring Physician:  No ref. provider found     Patient Active Problem List   Diagnosis    Extramedullary plasmacytoma (HCC)    Obesity (BMI 30.0-34. 9)    Chest pain    Unstable angina (HCC)       Review of Systems   Constitutional:  Negative for activity change, appetite change, fatigue, fever and unexpected weight change. HENT:  Negative for congestion, trouble swallowing and voice change. Eyes:  Negative for visual disturbance. Respiratory:  Negative for apnea, cough, choking, chest tightness, shortness of breath, wheezing and stridor. Cardiovascular:  Negative for chest pain, palpitations and leg swelling. Gastrointestinal:  Negative for abdominal distention, abdominal pain, anal bleeding, blood in stool, nausea and vomiting. Endocrine: Negative for cold intolerance and heat intolerance. Genitourinary:  Negative for hematuria. Musculoskeletal:  Negative for arthralgias, gait problem, joint swelling and myalgias. Skin:  Negative for color change and rash. Allergic/Immunologic: Negative for environmental allergies and food allergies. Neurological:  Negative for dizziness, tremors, syncope, facial asymmetry, weakness, light-headedness, numbness and headaches. Hematological:  Does not bruise/bleed easily. Psychiatric/Behavioral:  Negative for agitation, behavioral problems and sleep disturbance.        Past Medical History:   Diagnosis Date    CAD (coronary artery disease)     COVID-19 2022    Enlarged lymph nodes     Heart disease     Hyperlipidemia     Hypertension     Plasmacytoma ([facility] [address] 2021       Allergies   Allergen Reactions    No Known Allergies        Current Outpatient Medications   Medication Sig Dispense Refill    atorvastatin (LIPITOR) 40 MG tablet Take 1 tablet by mouth nightly 90 tablet 3    isosorbide mononitrate (IMDUR) 30 MG extended release tablet Take 1 tablet by mouth daily 90 tablet 3    clopidogrel (PLAVIX) 75 MG tablet Take 1 tablet by mouth daily 90 tablet 3    nitroGLYCERIN (NITROSTAT) 0.4 MG SL tablet Place 1 tablet under the tongue every 5 minutes as needed for Chest pain up to max of 3 total doses. If no relief after 1 dose, call 911. 25 tablet 3    predniSONE (DELTASONE) 5 MG tablet Take 1 tablet by mouth daily 30 tablet 5    lisinopril (PRINIVIL;ZESTRIL) 20 MG tablet Take 1 tablet by mouth daily 90 tablet 3    metoprolol succinate (TOPROL XL) 50 MG extended release tablet Take 1 tablet by mouth daily 90 tablet 3    LORATADINE ALLERGY RELIEF PO Take by mouth      acetaminophen (TYLENOL) 325 MG tablet Take 650 mg by mouth every 6 hours as needed for Pain      Multiple Vitamins-Minerals (THERAPEUTIC MULTIVITAMIN-MINERALS) tablet Take 1 tablet by mouth daily      aspirin 81 MG tablet Take 81 mg by mouth daily. leflunomide (ARAVA) 20 MG tablet Take 1 tablet by mouth once daily (Patient not taking: Reported on 3/16/2023) 30 tablet 2     No current facility-administered medications for this visit.        Social History     Socioeconomic History    Marital status:      Spouse name: Paula Casas    Number of children: 1    Years of education: None    Highest education level: None   Tobacco Use    Smoking status: Former     Packs/day: 1.00     Years: 37.00     Pack years: 37.00     Types: Cigarettes     Start date: 1964     Quit date: 2001     Years since quittin.4    Smokeless tobacco: Never   Vaping Use    Vaping Use: Never used   Substance and Sexual Activity    Alcohol use: Not Currently     Comment: beer every so often     Drug use: Never       Family History   Problem Relation Age of Onset    Arthritis Mother     Heart Disease Father        Blood pressure 129/85, pulse 83, resp. rate 18, height 5' 10\" (1.778 m), weight 191 lb (86.6 kg). Physical Exam:    General Appearance: alert and oriented to person, place and time, in no acute distress  Cardiovascular: normal rate, regular rhythm, normal S1 and S2, no murmurs, rubs, clicks, or gallops, distal pulses intact, no carotid bruits, no JVD  Pulmonary/Chest: clear to auscultation bilaterally- no wheezes, rales or rhonchi, normal air movement, no respiratory distress  Abdomen: soft, non-tender, non-distended, normal bowel sounds, no masses   Extremities: no cyanosis, clubbing or edema, pulse   Skin: warm and dry, no rash or erythema  Head: normocephalic and atraumatic  Eyes: pupils equal, round, and reactive to light  Neck: supple and non-tender without mass, no thyromegaly   Musculoskeletal: normal range of motion, no joint swelling, deformity or tenderness  Neurological: alert, oriented, normal speech, no focal findings or movement disorder noted    Lab Data:    Cardiac Enzymes:  No results for input(s): CKTOTAL, CKMB, CKMBINDEX, TROPONINI in the last 72 hours.     CBC:   Lab Results   Component Value Date/Time    WBC 6.8 02/27/2023 12:00 AM    RBC 3.86 02/27/2023 12:00 AM    HGB 11.9 02/27/2023 12:00 AM    HCT 35.6 02/27/2023 12:00 AM     02/27/2023 12:00 AM       CMP:    Lab Results   Component Value Date/Time     02/27/2023 12:00 AM    K 4.2 02/27/2023 12:00 AM    K 4.3 02/17/2023 05:57 AM     02/27/2023 12:00 AM    CO2 28 02/27/2023 12:00 AM    BUN 20 02/27/2023 12:00 AM    CREATININE 1.4 02/27/2023 12:00 AM    LABGLOM 50 02/27/2023 12:00 AM    GLUCOSE 94 02/27/2023 12:00 AM    CALCIUM 9.5 02/27/2023 12:00 AM       Hepatic Function Panel:    Lab Results   Component Value Date/Time    ALKPHOS 82 02/27/2023 12:00 AM    ALT 11 02/27/2023 12:00 AM    AST 15 02/27/2023 12:00 AM    PROT 6.3 01/10/2023 11:17 AM    BILITOT 0.7 02/27/2023 12:00 AM    BILIDIR <0.2 10/12/2022 08:50 AM    LABALBU 4.4 02/27/2023 12:00 AM Magnesium:  No results found for: MG    PT/INR:  No results found for: PROTIME, INR    HgBA1c:  No results found for: LABA1C    FLP:    Lab Results   Component Value Date/Time    TRIG 132 02/18/2023 05:46 AM    HDL 37 02/18/2023 05:46 AM    LDLCALC 70 02/18/2023 05:46 AM       TSH:  No results found for: TSH     Diagnosis Orders   1. Coronary artery disease involving native coronary artery of native heart without angina pectoris  67825 - [place] ELECTROCARDIOGRAM, COMPLETE    CBC    Basic Metabolic Panel    Lipid Panel    Hepatic Function Panel      2. Dyslipidemia (high LDL; low HDL)  CBC    Basic Metabolic Panel    Lipid Panel    Hepatic Function Panel           Assessment/Plan    Jose Ochoa is an 82years old gentleman who is patient had a stent to the LAD diagonal artery. He has been on the Brilinta he indicates he cannot afford it I gave him a prescription of the Plavix to take it 300 mg the first day then 75 mg a day he was instructed to stop with his omeprazole with the Plavix. Patient was offered to go to the cardiac rehab he wants to go to [place]. He does not want to go to cardiac rehab. His EKG today showed sinus rhythm no acute changes he will continue with the current medication the patient will be seen periodically and seek medical attention with any change in clinical condition thank    Orders Placed This Encounter   Procedures    CBC     Standing Status:   Future     Standing Expiration Date:   1/78/6110    Basic Metabolic Panel     Standing Status:   Future     Standing Expiration Date:   3/16/2024    Lipid Panel     Standing Status:   Future     Standing Expiration Date:   3/16/2024     Order Specific Question:   Is Patient Fasting?/# of Hours     Answer:   12 hours    Hepatic Function Panel     Standing Status:   Future     Standing Expiration Date:   3/16/2024    73893 - [place] ELECTROCARDIOGRAM, COMPLETE       Return in about 6 months (around 9/16/2023).      Raymundo Libman, MD

## 2023-05-16 ENCOUNTER — OFFICE VISIT (OUTPATIENT)
Dept: RHEUMATOLOGY | Age: 83
End: 2023-05-16
Payer: OTHER GOVERNMENT

## 2023-05-16 VITALS
OXYGEN SATURATION: 98 % | WEIGHT: 194 LBS | BODY MASS INDEX: 27.77 KG/M2 | HEART RATE: 86 BPM | SYSTOLIC BLOOD PRESSURE: 154 MMHG | HEIGHT: 70 IN | DIASTOLIC BLOOD PRESSURE: 68 MMHG

## 2023-05-16 DIAGNOSIS — M19.90 INFLAMMATORY ARTHRITIS: ICD-10-CM

## 2023-05-16 DIAGNOSIS — Z51.81 ENCOUNTER FOR MONITORING OF SYSTEMIC STEROID THERAPY: ICD-10-CM

## 2023-05-16 DIAGNOSIS — Z79.52 ENCOUNTER FOR MONITORING OF SYSTEMIC STEROID THERAPY: ICD-10-CM

## 2023-05-16 DIAGNOSIS — Z13.820 SCREENING FOR OSTEOPOROSIS: Primary | ICD-10-CM

## 2023-05-16 PROCEDURE — 99214 OFFICE O/P EST MOD 30 MIN: CPT | Performed by: INTERNAL MEDICINE

## 2023-05-16 PROCEDURE — 1036F TOBACCO NON-USER: CPT | Performed by: INTERNAL MEDICINE

## 2023-05-16 PROCEDURE — G8427 DOCREV CUR MEDS BY ELIG CLIN: HCPCS | Performed by: INTERNAL MEDICINE

## 2023-05-16 PROCEDURE — 1123F ACP DISCUSS/DSCN MKR DOCD: CPT | Performed by: INTERNAL MEDICINE

## 2023-05-16 PROCEDURE — G8417 CALC BMI ABV UP PARAM F/U: HCPCS | Performed by: INTERNAL MEDICINE

## 2023-05-16 RX ORDER — PREDNISONE 1 MG/1
5 TABLET ORAL DAILY
Qty: 30 TABLET | Refills: 5 | Status: SHIPPED | OUTPATIENT
Start: 2023-05-16 | End: 2023-06-15

## 2023-05-16 ASSESSMENT — ENCOUNTER SYMPTOMS
VOMITING: 0
ABDOMINAL PAIN: 0
NAUSEA: 0
COUGH: 0
SHORTNESS OF BREATH: 0

## 2023-05-16 NOTE — PROGRESS NOTES
release tablet, Take 1 tablet by mouth daily, Disp: 90 tablet, Rfl: 3    clopidogrel (PLAVIX) 75 MG tablet, Take 1 tablet by mouth daily, Disp: 90 tablet, Rfl: 3    nitroGLYCERIN (NITROSTAT) 0.4 MG SL tablet, Place 1 tablet under the tongue every 5 minutes as needed for Chest pain up to max of 3 total doses. If no relief after 1 dose, call 911., Disp: 25 tablet, Rfl: 3    lisinopril (PRINIVIL;ZESTRIL) 20 MG tablet, Take 1 tablet by mouth daily, Disp: 90 tablet, Rfl: 3    metoprolol succinate (TOPROL XL) 50 MG extended release tablet, Take 1 tablet by mouth daily, Disp: 90 tablet, Rfl: 3    LORATADINE ALLERGY RELIEF PO, Take by mouth, Disp: , Rfl:     acetaminophen (TYLENOL) 325 MG tablet, Take 2 tablets by mouth every 6 hours as needed for Pain, Disp: , Rfl:     Multiple Vitamins-Minerals (THERAPEUTIC MULTIVITAMIN-MINERALS) tablet, Take 1 tablet by mouth daily, Disp: , Rfl:     aspirin 81 MG tablet, Take 1 tablet by mouth daily, Disp: , Rfl:     Allergies:    No known allergies    Social History:     reports that he quit smoking about 21 years ago. His smoking use included cigarettes. He started smoking about 58 years ago. He has a 37.00 pack-year smoking history. He has never used smokeless tobacco. He reports that he does not currently use alcohol. He reports that he does not use drugs. Family History:   family history includes Arthritis in his mother; Heart Disease in his father. REVIEW OF SYSTEMS:    Review of Systems   Constitutional:  Positive for fatigue. Negative for chills, fever and unexpected weight change. HENT:  Negative for mouth sores. Respiratory:  Negative for cough and shortness of breath. Cardiovascular:  Negative for chest pain and leg swelling. Gastrointestinal:  Negative for abdominal pain, nausea and vomiting. Skin:  Negative for rash. Neurological:  Negative for headaches.         PHYSICAL EXAM:    Vitals:    BP (!) 154/68 (Site: Left Upper Arm, Position: Sitting, Cuff

## 2023-05-30 ENCOUNTER — HOSPITAL ENCOUNTER (OUTPATIENT)
Dept: RADIATION ONCOLOGY | Age: 83
Discharge: HOME | End: 2023-05-30
Attending: RADIOLOGY
Payer: MEDICARE

## 2023-05-30 VITALS
TEMPERATURE: 97.6 F | WEIGHT: 194 LBS | BODY MASS INDEX: 27.84 KG/M2 | RESPIRATION RATE: 18 BRPM | HEART RATE: 73 BPM | SYSTOLIC BLOOD PRESSURE: 169 MMHG | OXYGEN SATURATION: 96 % | DIASTOLIC BLOOD PRESSURE: 79 MMHG

## 2023-05-30 PROCEDURE — 99212 OFFICE O/P EST SF 10 MIN: CPT

## 2023-05-30 PROCEDURE — 99213 OFFICE O/P EST LOW 20 MIN: CPT

## 2023-05-30 ASSESSMENT — ENCOUNTER SYMPTOMS
TROUBLE SWALLOWING: 0
BLOOD IN STOOL: 0
SINUS PRESSURE: 0
SHORTNESS OF BREATH: 0
SINUS PAIN: 0
ABDOMINAL PAIN: 0
VOMITING: 0
NAUSEA: 0
BACK PAIN: 0
FACIAL SWELLING: 0
RECTAL PAIN: 0
COUGH: 0
SORE THROAT: 0
VOICE CHANGE: 0

## 2023-05-30 NOTE — PROGRESS NOTES
the patient at today's visit reviewing pertinent information related to their oncologic diagnosis, including any recent labs, imaging, follow ups and plan of care going forward.     CC:Dr. Mike Coreas ([facility]) Dr. Roberto Shukla (PCP)  ACC: [facility]

## 2023-05-31 ENCOUNTER — HOSPITAL ENCOUNTER (OUTPATIENT)
Dept: WOMENS IMAGING | Age: 83
Discharge: HOME | End: 2023-05-31
Payer: MEDICARE

## 2023-05-31 DIAGNOSIS — Z13.820 SCREENING FOR OSTEOPOROSIS: ICD-10-CM

## 2023-05-31 DIAGNOSIS — Z79.52 ENCOUNTER FOR MONITORING OF SYSTEMIC STEROID THERAPY: ICD-10-CM

## 2023-05-31 DIAGNOSIS — Z51.81 ENCOUNTER FOR MONITORING OF SYSTEMIC STEROID THERAPY: ICD-10-CM

## 2023-05-31 PROCEDURE — 77080 DXA BONE DENSITY AXIAL: CPT

## 2023-06-05 ENCOUNTER — HOSPITAL ENCOUNTER (OUTPATIENT)
Dept: CT IMAGING | Age: 83
Discharge: HOME | End: 2023-06-05
Payer: MEDICARE

## 2023-06-05 DIAGNOSIS — C90.20 EXTRAMEDULLARY PLASMACYTOMA NOT HAVING ACHIEVED REMISSION (HCC): ICD-10-CM

## 2023-06-05 DIAGNOSIS — R59.9 ADENOPATHY: ICD-10-CM

## 2023-06-05 PROCEDURE — 71250 CT THORAX DX C-: CPT

## 2023-06-06 ENCOUNTER — NURSE ONLY (OUTPATIENT)
Dept: LAB | Age: 83
End: 2023-06-06

## 2023-06-06 DIAGNOSIS — R59.9 ADENOPATHY: ICD-10-CM

## 2023-06-06 DIAGNOSIS — C90.20 EXTRAMEDULLARY PLASMACYTOMA NOT HAVING ACHIEVED REMISSION (HCC): ICD-10-CM

## 2023-06-06 LAB
ALBUMIN SERPL BCG-MCNC: 4.1 G/DL (ref 3.5–5.1)
ALP SERPL-CCNC: 84 U/L (ref 38–126)
ALT SERPL W/O P-5'-P-CCNC: 13 U/L (ref 11–66)
ANION GAP SERPL CALC-SCNC: 7 MEQ/L (ref 8–16)
AST SERPL-CCNC: 22 U/L (ref 5–40)
BASOPHILS ABSOLUTE: 0 THOU/MM3 (ref 0–0.1)
BASOPHILS NFR BLD AUTO: 0.3 %
BILIRUB SERPL-MCNC: 0.4 MG/DL (ref 0.3–1.2)
BUN SERPL-MCNC: 17 MG/DL (ref 7–22)
CALCIUM SERPL-MCNC: 9.6 MG/DL (ref 8.5–10.5)
CHLORIDE SERPL-SCNC: 106 MEQ/L (ref 98–111)
CO2 SERPL-SCNC: 28 MEQ/L (ref 23–33)
CREAT SERPL-MCNC: 1.2 MG/DL (ref 0.4–1.2)
DEPRECATED RDW RBC AUTO: 51 FL (ref 35–45)
EOSINOPHIL NFR BLD AUTO: 1 %
EOSINOPHILS ABSOLUTE: 0.1 THOU/MM3 (ref 0–0.4)
ERYTHROCYTE [DISTWIDTH] IN BLOOD BY AUTOMATED COUNT: 14.4 % (ref 11.5–14.5)
GFR SERPL CREATININE-BSD FRML MDRD: > 60 ML/MIN/1.73M2
GLUCOSE SERPL-MCNC: 116 MG/DL (ref 70–108)
HCT VFR BLD AUTO: 37.6 % (ref 42–52)
HGB BLD-MCNC: 11.6 GM/DL (ref 14–18)
IGA SERPL-MCNC: 138 MG/DL (ref 70–400)
IGG SERPL-MCNC: 916 MG/DL (ref 700–1600)
IMM GRANULOCYTES # BLD AUTO: 0.09 THOU/MM3 (ref 0–0.07)
IMM GRANULOCYTES NFR BLD AUTO: 1.3 %
LYMPHOCYTES ABSOLUTE: 0.9 THOU/MM3 (ref 1–4.8)
LYMPHOCYTES NFR BLD AUTO: 13.6 %
MCH RBC QN AUTO: 29.5 PG (ref 26–33)
MCHC RBC AUTO-ENTMCNC: 30.9 GM/DL (ref 32.2–35.5)
MCV RBC AUTO: 95.7 FL (ref 80–94)
MONOCYTES ABSOLUTE: 0.6 THOU/MM3 (ref 0.4–1.3)
MONOCYTES NFR BLD AUTO: 8.9 %
NEUTROPHILS NFR BLD AUTO: 74.9 %
NRBC BLD AUTO-RTO: 0 /100 WBC
PLATELET # BLD AUTO: 175 THOU/MM3 (ref 130–400)
PMV BLD AUTO: 11.2 FL (ref 9.4–12.4)
POTASSIUM SERPL-SCNC: 4.2 MEQ/L (ref 3.5–5.2)
PROT SERPL-MCNC: 7.1 G/DL (ref 6.1–8)
RBC # BLD AUTO: 3.93 MILL/MM3 (ref 4.7–6.1)
SEGMENTED NEUTROPHILS ABSOLUTE COUNT: 5.1 THOU/MM3 (ref 1.8–7.7)
SODIUM SERPL-SCNC: 141 MEQ/L (ref 135–145)
WBC # BLD AUTO: 6.8 THOU/MM3 (ref 4.8–10.8)

## 2023-06-08 LAB — IGM SERPL-MCNC: 45 MG/DL (ref 40–230)

## 2023-06-14 ENCOUNTER — HOSPITAL ENCOUNTER (OUTPATIENT)
Dept: INFUSION THERAPY | Age: 83
Discharge: HOME | End: 2023-06-14
Payer: MEDICARE

## 2023-06-14 VITALS
OXYGEN SATURATION: 97 % | RESPIRATION RATE: 16 BRPM | SYSTOLIC BLOOD PRESSURE: 133 MMHG | TEMPERATURE: 97.9 F | DIASTOLIC BLOOD PRESSURE: 65 MMHG | HEART RATE: 72 BPM

## 2023-06-14 PROCEDURE — 99211 OFF/OP EST MAY X REQ PHY/QHP: CPT

## 2023-08-17 ENCOUNTER — OFFICE VISIT (OUTPATIENT)
Dept: CARDIOLOGY CLINIC | Age: 83
End: 2023-08-17
Payer: MEDICARE

## 2023-08-17 VITALS
DIASTOLIC BLOOD PRESSURE: 75 MMHG | RESPIRATION RATE: 18 BRPM | TEMPERATURE: 97.9 F | OXYGEN SATURATION: 97 % | HEART RATE: 70 BPM | SYSTOLIC BLOOD PRESSURE: 148 MMHG

## 2023-08-17 DIAGNOSIS — E78.5 DYSLIPIDEMIA (HIGH LDL; LOW HDL): ICD-10-CM

## 2023-08-17 DIAGNOSIS — I20.0 UNSTABLE ANGINA (HCC): Primary | ICD-10-CM

## 2023-08-17 PROCEDURE — 1123F ACP DISCUSS/DSCN MKR DOCD: CPT | Performed by: INTERNAL MEDICINE

## 2023-08-17 PROCEDURE — 99214 OFFICE O/P EST MOD 30 MIN: CPT | Performed by: INTERNAL MEDICINE

## 2023-08-17 PROCEDURE — G8427 DOCREV CUR MEDS BY ELIG CLIN: HCPCS | Performed by: INTERNAL MEDICINE

## 2023-08-17 PROCEDURE — 1036F TOBACCO NON-USER: CPT | Performed by: INTERNAL MEDICINE

## 2023-08-17 PROCEDURE — G8417 CALC BMI ABV UP PARAM F/U: HCPCS | Performed by: INTERNAL MEDICINE

## 2023-08-17 RX ORDER — METOPROLOL SUCCINATE 50 MG/1
50 TABLET, EXTENDED RELEASE ORAL DAILY
Qty: 90 TABLET | Refills: 3 | Status: SHIPPED | OUTPATIENT
Start: 2023-08-17

## 2023-08-17 RX ORDER — ISOSORBIDE MONONITRATE 60 MG/1
60 TABLET, EXTENDED RELEASE ORAL DAILY
Qty: 90 TABLET | Refills: 3 | Status: SHIPPED | OUTPATIENT
Start: 2023-08-17

## 2023-08-17 RX ORDER — LISINOPRIL 20 MG/1
20 TABLET ORAL DAILY
Qty: 90 TABLET | Refills: 3 | Status: SHIPPED | OUTPATIENT
Start: 2023-08-17

## 2023-08-17 ASSESSMENT — ENCOUNTER SYMPTOMS
CHOKING: 0
APNEA: 0
BLOOD IN STOOL: 0
NAUSEA: 0
ABDOMINAL DISTENTION: 0
CHEST TIGHTNESS: 0
COLOR CHANGE: 0
ANAL BLEEDING: 0
TROUBLE SWALLOWING: 0
SHORTNESS OF BREATH: 0
COUGH: 0
STRIDOR: 0
WHEEZING: 0
VOMITING: 0
VOICE CHANGE: 0
ABDOMINAL PAIN: 0

## 2023-08-17 NOTE — PROGRESS NOTES
PROTIME, INR    HgBA1c:  No results found for: LABA1C    FLP:    Lab Results   Component Value Date/Time    TRIG 132 02/18/2023 05:46 AM    HDL 37 02/18/2023 05:46 AM    LDLCALC 70 02/18/2023 05:46 AM       TSH:  No results found for: TSH     Diagnosis Orders   1. Unstable angina (HCC)  CBC    Basic Metabolic Panel    Lipid Panel    Hepatic Function Panel    isosorbide mononitrate (IMDUR) 60 MG extended release tablet      2. Dyslipidemia (high LDL; low HDL)  CBC    Basic Metabolic Panel    Lipid Panel    Hepatic Function Panel    isosorbide mononitrate (IMDUR) 60 MG extended release tablet           Assessment/Plan    Gaurav eTe is an 82years old patient history of coronary artery disease has a history of prior intervention. He is here for a follow-up. He indicates that he is feeling well he denies chest pain or shortness of breath he has been physically active he has a history of hypercholesterolemia on a statin under control. The patient blood pressure on the high side I increase his Imdur to 60 mg a day. The patient lab finding the. The medication was reconciled with and sent to his pharmacy all his question were answered to his satisfaction he was advised about diet exercise risk factor modification he will be seen annually with the lab work to seek medical attention if he has any change in clinical condition thank    Orders Placed This Encounter   Procedures    CBC     Standing Status:   Future     Standing Expiration Date:   7/38/6429    Basic Metabolic Panel     Standing Status:   Future     Standing Expiration Date:   8/17/2024    Lipid Panel     Standing Status:   Future     Standing Expiration Date:   8/17/2024     Order Specific Question:   Is Patient Fasting?/# of Hours     Answer:   12 hours    Hepatic Function Panel     Standing Status:   Future     Standing Expiration Date:   8/17/2024       No follow-ups on file.      Genevieve Turner MD

## 2023-09-18 ENCOUNTER — OFFICE VISIT (OUTPATIENT)
Dept: RHEUMATOLOGY | Age: 83
End: 2023-09-18
Payer: MEDICARE

## 2023-09-18 VITALS
HEIGHT: 70 IN | OXYGEN SATURATION: 98 % | DIASTOLIC BLOOD PRESSURE: 66 MMHG | BODY MASS INDEX: 28.2 KG/M2 | SYSTOLIC BLOOD PRESSURE: 138 MMHG | HEART RATE: 64 BPM | WEIGHT: 197 LBS

## 2023-09-18 DIAGNOSIS — M19.90 INFLAMMATORY ARTHRITIS: ICD-10-CM

## 2023-09-18 PROCEDURE — 1036F TOBACCO NON-USER: CPT | Performed by: INTERNAL MEDICINE

## 2023-09-18 PROCEDURE — 1123F ACP DISCUSS/DSCN MKR DOCD: CPT | Performed by: INTERNAL MEDICINE

## 2023-09-18 PROCEDURE — G8417 CALC BMI ABV UP PARAM F/U: HCPCS | Performed by: INTERNAL MEDICINE

## 2023-09-18 PROCEDURE — 99214 OFFICE O/P EST MOD 30 MIN: CPT | Performed by: INTERNAL MEDICINE

## 2023-09-18 PROCEDURE — G8427 DOCREV CUR MEDS BY ELIG CLIN: HCPCS | Performed by: INTERNAL MEDICINE

## 2023-09-18 RX ORDER — PREDNISONE 5 MG/1
5 TABLET ORAL DAILY
Qty: 90 TABLET | Refills: 1 | Status: SHIPPED | OUTPATIENT
Start: 2023-09-18 | End: 2024-03-16

## 2023-09-18 ASSESSMENT — ENCOUNTER SYMPTOMS
SHORTNESS OF BREATH: 0
ABDOMINAL PAIN: 0
NAUSEA: 0
VOMITING: 0
COUGH: 0

## 2023-09-18 NOTE — PROGRESS NOTES
[address] [facility]   Rheumatology Clinic Note      9/18/2023       CHIEF COMPLAINT:    Chief Complaint   Patient presents with    Follow-up     4 month f/u Screening for osteoporosis    Other     Pt tolerating well           HISTORY OF PRESENT ILLNESS:    82 y.o. male with suspected seronegative rheumatoid arthritis (erosions on Xray Lt hand) presents for follow up. Presently, he is on prednsione 5 mg daily. Was on leflunomide 20 mg daily, but stopped due to running out for over 5 months. Past med: methotrexate (SOB, headaches, vision changes)    Overall, he is doing well. Denies any present joint pain, joint swelling. Has mild achiness and stiffness in his hands that lasts for few minutes. RECAP:  Presents for evaluation of intermittent joint pain. Pain in shoulders, wrists, elbows and fingers. Swelling in the hands. Started about 6 weeks ago, 3-days after getting flu vaccine. Was given \"a number of pills\" that did not help. Reports having stiffness all day. Following with oncology for extramedullary plasmacytoma. Completed radiation therapy this year. No psoriasis. No IBD-like symptoms. Past Medical History:     has a past medical history of CAD (coronary artery disease), COVID-19, Enlarged lymph nodes, Heart disease, Hyperlipidemia, Hypertension, and Plasmacytoma ([address]). Past Surgical History:     has a past surgical history that includes Abscess Drainage; Colonoscopy; hernia repair (05/18/2016); Carotid stent placement (2004); CT BIOPSY BONE MARROW (09/29/2021); and bronchoscopy (N/A, 10/22/2021).     Current Medications:      Current Outpatient Medications:     lisinopril (PRINIVIL;ZESTRIL) 20 MG tablet, Take 1 tablet by mouth daily, Disp: 90 tablet, Rfl: 3    metoprolol succinate (TOPROL XL) 50 MG extended release tablet, Take 1 tablet by mouth daily, Disp: 90 tablet, Rfl: 3    isosorbide mononitrate (IMDUR) 60 MG extended release tablet, Take 1 tablet by mouth daily, Disp: 90

## 2023-11-22 ENCOUNTER — HOSPITAL ENCOUNTER (OUTPATIENT)
Dept: CT IMAGING | Age: 83
Discharge: HOME | End: 2023-11-22
Attending: INTERNAL MEDICINE
Payer: MEDICARE

## 2023-11-22 ENCOUNTER — HOSPITAL ENCOUNTER (OUTPATIENT)
Age: 83
Discharge: HOME | End: 2023-11-22
Payer: MEDICARE

## 2023-11-22 DIAGNOSIS — C90.20 EXTRAMEDULLARY PLASMACYTOMA NOT HAVING ACHIEVED REMISSION (HCC): ICD-10-CM

## 2023-11-22 DIAGNOSIS — R91.8 LUNG MASS: ICD-10-CM

## 2023-11-22 LAB
ALBUMIN SERPL BCG-MCNC: 4.1 G/DL (ref 3.5–5.1)
ALP SERPL-CCNC: 76 U/L (ref 38–126)
ALT SERPL W/O P-5'-P-CCNC: 9 U/L (ref 11–66)
ANION GAP SERPL CALC-SCNC: 11 MEQ/L (ref 8–16)
AST SERPL-CCNC: 17 U/L (ref 5–40)
BASOPHILS ABSOLUTE: 0 THOU/MM3 (ref 0–0.1)
BASOPHILS NFR BLD AUTO: 0.2 %
BILIRUB SERPL-MCNC: 0.4 MG/DL (ref 0.3–1.2)
BUN SERPL-MCNC: 17 MG/DL (ref 7–22)
CALCIUM SERPL-MCNC: 9.6 MG/DL (ref 8.5–10.5)
CHLORIDE SERPL-SCNC: 108 MEQ/L (ref 98–111)
CO2 SERPL-SCNC: 26 MEQ/L (ref 23–33)
CREAT SERPL-MCNC: 1.3 MG/DL (ref 0.4–1.2)
DEPRECATED RDW RBC AUTO: 52.8 FL (ref 35–45)
EOSINOPHIL NFR BLD AUTO: 0.9 %
EOSINOPHILS ABSOLUTE: 0.1 THOU/MM3 (ref 0–0.4)
ERYTHROCYTE [DISTWIDTH] IN BLOOD BY AUTOMATED COUNT: 15 % (ref 11.5–14.5)
GFR SERPL CREATININE-BSD FRML MDRD: 55 ML/MIN/1.73M2
GLUCOSE SERPL-MCNC: 100 MG/DL (ref 70–108)
HCT VFR BLD AUTO: 33.7 % (ref 42–52)
HGB BLD-MCNC: 10.4 GM/DL (ref 14–18)
IGA SERPL-MCNC: 141 MG/DL (ref 70–400)
IGG SERPL-MCNC: 999 MG/DL (ref 700–1600)
IGM SERPL-MCNC: 47 MG/DL (ref 40–230)
IMM GRANULOCYTES # BLD AUTO: 0.07 THOU/MM3 (ref 0–0.07)
IMM GRANULOCYTES NFR BLD AUTO: 1.2 %
LYMPHOCYTES ABSOLUTE: 0.9 THOU/MM3 (ref 1–4.8)
LYMPHOCYTES NFR BLD AUTO: 15.4 %
MCH RBC QN AUTO: 29.4 PG (ref 26–33)
MCHC RBC AUTO-ENTMCNC: 30.9 GM/DL (ref 32.2–35.5)
MCV RBC AUTO: 95.2 FL (ref 80–94)
MONOCYTES ABSOLUTE: 0.6 THOU/MM3 (ref 0.4–1.3)
MONOCYTES NFR BLD AUTO: 10.6 %
NEUTROPHILS NFR BLD AUTO: 71.7 %
NRBC BLD AUTO-RTO: 0 /100 WBC
PLATELET # BLD AUTO: 189 THOU/MM3 (ref 130–400)
PMV BLD AUTO: 10.8 FL (ref 9.4–12.4)
POC CREATININE WHOLE BLOOD: 1.5 MG/DL (ref 0.5–1.2)
POTASSIUM SERPL-SCNC: 4.2 MEQ/L (ref 3.5–5.2)
PROT SERPL-MCNC: 7.1 G/DL (ref 6.1–8)
RBC # BLD AUTO: 3.54 MILL/MM3 (ref 4.7–6.1)
SEGMENTED NEUTROPHILS ABSOLUTE COUNT: 4.1 THOU/MM3 (ref 1.8–7.7)
SODIUM SERPL-SCNC: 145 MEQ/L (ref 135–145)
WBC # BLD AUTO: 5.7 THOU/MM3 (ref 4.8–10.8)

## 2023-11-22 PROCEDURE — 82784 ASSAY IGA/IGD/IGG/IGM EACH: CPT

## 2023-11-22 PROCEDURE — 86334 IMMUNOFIX E-PHORESIS SERUM: CPT

## 2023-11-22 PROCEDURE — 84165 PROTEIN E-PHORESIS SERUM: CPT

## 2023-11-22 PROCEDURE — 71260 CT THORAX DX C+: CPT

## 2023-11-22 PROCEDURE — 82565 ASSAY OF CREATININE: CPT

## 2023-11-22 PROCEDURE — 84155 ASSAY OF PROTEIN SERUM: CPT

## 2023-11-22 PROCEDURE — 6360000004 HC RX CONTRAST MEDICATION: Performed by: INTERNAL MEDICINE

## 2023-11-22 PROCEDURE — 83883 ASSAY NEPHELOMETRY NOT SPEC: CPT

## 2023-11-22 PROCEDURE — 80053 COMPREHEN METABOLIC PANEL: CPT

## 2023-11-22 PROCEDURE — 36415 COLL VENOUS BLD VENIPUNCTURE: CPT

## 2023-11-22 PROCEDURE — 85025 COMPLETE CBC W/AUTO DIFF WBC: CPT

## 2023-11-22 RX ADMIN — IOPAMIDOL 80 ML: 755 INJECTION, SOLUTION INTRAVENOUS at 09:25

## 2023-11-24 LAB
ALBUMIN SERPL ELPH-MCNC: 3.74 G/DL (ref 3.75–5.01)
ALPHA1 GLOB SERPL ELPH-MCNC: 0.37 G/DL (ref 0.19–0.46)
ALPHA2 GLOB SERPL ELPH-MCNC: 0.84 G/DL (ref 0.48–1.05)
B-GLOBULIN SERPL ELPH-MCNC: 0.75 G/DL (ref 0.48–1.1)
GAMMA GLOB SERPL ELPH-MCNC: 1 G/DL (ref 0.62–1.51)
INTERPRETATION SERPL IFE-IMP: ABNORMAL
KAPPA/LAMBDA FREE LIGHT CHAINS: NORMAL
M PROTEIN SERPL ELPH-MCNC: ABNORMAL G/DL
MONOCLON BAND OBS SERPL: ABNORMAL
PATHOLOGY STUDY: ABNORMAL
PROT SERPL-MCNC: 6.7 G/DL (ref 6.3–8.2)

## 2023-11-29 NOTE — PROGRESS NOTES
[facility]  [address] [place] [address]  Dept: [phone]  Loc: [phone]   Hematology/Oncology Progress Note (Clinic)        Rosa Yanes  1940  No ref. provider found   Shay Bergeron DO     Diagnosis/CC:   Chief Complaint   Patient presents with    Follow-up     Extramedullary plasmacytoma not having achieved remission       HPI:  DIAGNOSIS:  -Extramedullary plasmacytoma left neck (biopsy and t path at [facility]) 8/24/2021. Bone marrow biopsy-uninvolved. IgA 259/normal [id number]/minimally elevated, IgM 93/normal  Beta-2 microglobulin 3.1/mildly elevated  Serum immunofixation shows normal pattern with no monoclonal proteins. Serum free kappa light chains 84.82, lambda 37.03, ratio elevated at 2.29 (0.26-1.65)     -Abnormal PET scan (10/7/2021):  with multiple FDG avid mediastinal nodes. Representative subcarinal node 1.9 cm short axis with SUV of 4.9. Nonenlarged left hilar node with SUV of 3.2. Left inguinal node measuring 1 cm short axis with SUV of 4.6. Not palpable on exam.  Indeterminate 9 mm left apical lung nodule. No bone lesions. 10/22/2021 Dr. Ivet Shields EBUS-unremarkable although final cytology from station 7 core biopsy pending      -Indeterminate 9 mm left apex lung nodule mildly FDG avid with SUV of 1.8. Ongoing surveillance. TREATMENT:  - completed radiation to the left neck per Dr. Joey Benito 1/12/2022.  -s/p  [facility] EBUS for histology of the mediastinal nodes. Subcarinal node likely the best target. Path including subcarinal node all benign. PARAMETERS:  -Neck CT 6/21/2022-no evidence of disease  -PET/CT 10/7/2021  -Chest CT with contrast 10/4/2022  -Left neck exam; node excised. -Myeloma parameters ; serum light chains with elevated ratio. SUBJECTIVE: Patient is asymptomatic and feels great. Here for routine follow-up. No night sweats fevers or weight loss.   Sense of wellbeing

## 2023-11-30 ENCOUNTER — OFFICE VISIT (OUTPATIENT)
Dept: ONCOLOGY | Age: 83
End: 2023-11-30
Payer: MEDICARE

## 2023-11-30 ENCOUNTER — HOSPITAL ENCOUNTER (OUTPATIENT)
Dept: INFUSION THERAPY | Age: 83
Discharge: HOME | End: 2023-11-30
Payer: MEDICARE

## 2023-11-30 VITALS
SYSTOLIC BLOOD PRESSURE: 119 MMHG | OXYGEN SATURATION: 97 % | WEIGHT: 199.6 LBS | RESPIRATION RATE: 16 BRPM | HEART RATE: 59 BPM | DIASTOLIC BLOOD PRESSURE: 57 MMHG | HEIGHT: 70 IN | TEMPERATURE: 97.4 F | BODY MASS INDEX: 28.58 KG/M2

## 2023-11-30 VITALS
RESPIRATION RATE: 16 BRPM | SYSTOLIC BLOOD PRESSURE: 119 MMHG | HEART RATE: 59 BPM | TEMPERATURE: 97.4 F | DIASTOLIC BLOOD PRESSURE: 57 MMHG | OXYGEN SATURATION: 97 %

## 2023-11-30 DIAGNOSIS — R91.8 LUNG MASS: ICD-10-CM

## 2023-11-30 DIAGNOSIS — C90.20 EXTRAMEDULLARY PLASMACYTOMA NOT HAVING ACHIEVED REMISSION (HCC): Primary | ICD-10-CM

## 2023-11-30 PROCEDURE — 1123F ACP DISCUSS/DSCN MKR DOCD: CPT | Performed by: INTERNAL MEDICINE

## 2023-11-30 PROCEDURE — G8427 DOCREV CUR MEDS BY ELIG CLIN: HCPCS | Performed by: INTERNAL MEDICINE

## 2023-11-30 PROCEDURE — G8417 CALC BMI ABV UP PARAM F/U: HCPCS | Performed by: INTERNAL MEDICINE

## 2023-11-30 PROCEDURE — G8484 FLU IMMUNIZE NO ADMIN: HCPCS | Performed by: INTERNAL MEDICINE

## 2023-11-30 PROCEDURE — 99211 OFF/OP EST MAY X REQ PHY/QHP: CPT

## 2023-11-30 PROCEDURE — 1036F TOBACCO NON-USER: CPT | Performed by: INTERNAL MEDICINE

## 2023-11-30 PROCEDURE — 99214 OFFICE O/P EST MOD 30 MIN: CPT | Performed by: INTERNAL MEDICINE

## 2024-02-26 DIAGNOSIS — E78.5 DYSLIPIDEMIA (HIGH LDL; LOW HDL): ICD-10-CM

## 2024-02-26 DIAGNOSIS — I20.0 UNSTABLE ANGINA (HCC): ICD-10-CM

## 2024-02-26 RX ORDER — ISOSORBIDE MONONITRATE 60 MG/1
60 TABLET, EXTENDED RELEASE ORAL DAILY
Qty: 90 TABLET | Refills: 3 | Status: SHIPPED | OUTPATIENT
Start: 2024-02-26

## 2024-02-26 RX ORDER — METOPROLOL SUCCINATE 50 MG/1
50 TABLET, EXTENDED RELEASE ORAL DAILY
Qty: 90 TABLET | Refills: 3 | Status: SHIPPED | OUTPATIENT
Start: 2024-02-26

## 2024-02-26 RX ORDER — ATORVASTATIN CALCIUM 40 MG/1
40 TABLET, FILM COATED ORAL NIGHTLY
Qty: 90 TABLET | Refills: 3 | Status: SHIPPED | OUTPATIENT
Start: 2024-02-26

## 2024-02-26 RX ORDER — CLOPIDOGREL BISULFATE 75 MG/1
75 TABLET ORAL DAILY
Qty: 90 TABLET | Refills: 3 | Status: SHIPPED | OUTPATIENT
Start: 2024-02-26

## 2024-02-26 NOTE — TELEPHONE ENCOUNTER
Pt stopped by  and needs a refill on the following;    Rx: Metoprolol 50mg, Clopidogrel 75mg, Isosorbide 60mg, Atorvastatin 40mg    Pharmacy: [place], [place]     Pt next appt 8/22/24 with Dr. Reyez

## 2024-05-23 ENCOUNTER — HOSPITAL ENCOUNTER (OUTPATIENT)
Dept: CT IMAGING | Age: 84
Discharge: HOME | End: 2024-05-23
Attending: INTERNAL MEDICINE
Payer: MEDICARE

## 2024-05-23 ENCOUNTER — HOSPITAL ENCOUNTER (OUTPATIENT)
Age: 84
Discharge: HOME | End: 2024-05-23
Payer: MEDICARE

## 2024-05-23 DIAGNOSIS — C90.20 EXTRAMEDULLARY PLASMACYTOMA NOT HAVING ACHIEVED REMISSION (HCC): ICD-10-CM

## 2024-05-23 DIAGNOSIS — R91.8 LUNG MASS: ICD-10-CM

## 2024-05-23 LAB
ALBUMIN SERPL BCG-MCNC: 4.2 G/DL (ref 3.5–5.1)
ALP SERPL-CCNC: 78 U/L (ref 38–126)
ALT SERPL W/O P-5'-P-CCNC: 11 U/L (ref 11–66)
ANION GAP SERPL CALC-SCNC: 7 MEQ/L (ref 8–16)
AST SERPL-CCNC: 17 U/L (ref 5–40)
BASOPHILS ABSOLUTE: 0 THOU/MM3 (ref 0–0.1)
BASOPHILS NFR BLD AUTO: 0.3 %
BILIRUB SERPL-MCNC: 0.4 MG/DL (ref 0.3–1.2)
BUN SERPL-MCNC: 27 MG/DL (ref 7–22)
CALCIUM SERPL-MCNC: 10 MG/DL (ref 8.5–10.5)
CHLORIDE SERPL-SCNC: 106 MEQ/L (ref 98–111)
CO2 SERPL-SCNC: 26 MEQ/L (ref 23–33)
CREAT SERPL-MCNC: 1.3 MG/DL (ref 0.4–1.2)
DEPRECATED RDW RBC AUTO: 53.8 FL (ref 35–45)
EOSINOPHIL NFR BLD AUTO: 0.8 %
EOSINOPHILS ABSOLUTE: 0 THOU/MM3 (ref 0–0.4)
ERYTHROCYTE [DISTWIDTH] IN BLOOD BY AUTOMATED COUNT: 15.9 % (ref 11.5–14.5)
FERRITIN SERPL IA-MCNC: 157 NG/ML (ref 22–322)
FOLATE SERPL-MCNC: > 20 NG/ML (ref 4.8–24.2)
GFR SERPL CREATININE-BSD FRML MDRD: 55 ML/MIN/1.73M2
GLUCOSE SERPL-MCNC: 110 MG/DL (ref 70–108)
HCT VFR BLD AUTO: 35.9 % (ref 42–52)
HGB BLD-MCNC: 11.2 GM/DL (ref 14–18)
IGA SERPL-MCNC: 141 MG/DL (ref 70–400)
IGG SERPL-MCNC: 1040 MG/DL (ref 700–1600)
IGM SERPL-MCNC: 49 MG/DL (ref 40–230)
IMM GRANULOCYTES # BLD AUTO: 0.06 THOU/MM3 (ref 0–0.07)
IMM GRANULOCYTES NFR BLD AUTO: 1 %
IRON SATN MFR SERPL: 21 % (ref 20–50)
IRON SERPL-MCNC: 56 UG/DL (ref 65–195)
LYMPHOCYTES ABSOLUTE: 0.8 THOU/MM3 (ref 1–4.8)
LYMPHOCYTES NFR BLD AUTO: 12.3 %
MCH RBC QN AUTO: 29.3 PG (ref 26–33)
MCHC RBC AUTO-ENTMCNC: 31.2 GM/DL (ref 32.2–35.5)
MCV RBC AUTO: 94 FL (ref 80–94)
MONOCYTES ABSOLUTE: 0.6 THOU/MM3 (ref 0.4–1.3)
MONOCYTES NFR BLD AUTO: 10.5 %
NEUTROPHILS ABSOLUTE: 4.6 THOU/MM3 (ref 1.8–7.7)
NEUTROPHILS NFR BLD AUTO: 75.1 %
NRBC BLD AUTO-RTO: 0 /100 WBC
PLATELET # BLD AUTO: 186 THOU/MM3 (ref 130–400)
PMV BLD AUTO: 10.6 FL (ref 9.4–12.4)
POTASSIUM SERPL-SCNC: 4.7 MEQ/L (ref 3.5–5.2)
PROT SERPL-MCNC: 7.6 G/DL (ref 6.1–8)
RBC # BLD AUTO: 3.82 MILL/MM3 (ref 4.7–6.1)
SODIUM SERPL-SCNC: 139 MEQ/L (ref 135–145)
TIBC SERPL-MCNC: 264 UG/DL (ref 171–450)
VIT B12 SERPL-MCNC: 421 PG/ML (ref 211–911)
WBC # BLD AUTO: 6.1 THOU/MM3 (ref 4.8–10.8)

## 2024-05-23 PROCEDURE — 80053 COMPREHEN METABOLIC PANEL: CPT

## 2024-05-23 PROCEDURE — 83521 IG LIGHT CHAINS FREE EACH: CPT

## 2024-05-23 PROCEDURE — 86334 IMMUNOFIX E-PHORESIS SERUM: CPT

## 2024-05-23 PROCEDURE — 82746 ASSAY OF FOLIC ACID SERUM: CPT

## 2024-05-23 PROCEDURE — 82607 VITAMIN B-12: CPT

## 2024-05-23 PROCEDURE — 84155 ASSAY OF PROTEIN SERUM: CPT

## 2024-05-23 PROCEDURE — 83540 ASSAY OF IRON: CPT

## 2024-05-23 PROCEDURE — 82728 ASSAY OF FERRITIN: CPT

## 2024-05-23 PROCEDURE — 71250 CT THORAX DX C-: CPT

## 2024-05-23 PROCEDURE — 82784 ASSAY IGA/IGD/IGG/IGM EACH: CPT

## 2024-05-23 PROCEDURE — 83550 IRON BINDING TEST: CPT

## 2024-05-23 PROCEDURE — 85025 COMPLETE CBC W/AUTO DIFF WBC: CPT

## 2024-05-23 PROCEDURE — 36415 COLL VENOUS BLD VENIPUNCTURE: CPT

## 2024-05-23 PROCEDURE — 84165 PROTEIN E-PHORESIS SERUM: CPT

## 2024-05-25 LAB — KAPPA/LAMBDA FREE LIGHT CHAINS: NORMAL

## 2024-05-26 LAB — IMMUNOFIXATION WITH QUANT: NORMAL

## 2024-05-30 ENCOUNTER — HOSPITAL ENCOUNTER (OUTPATIENT)
Dept: INFUSION THERAPY | Age: 84
Discharge: HOME | End: 2024-05-30
Payer: MEDICARE

## 2024-05-30 ENCOUNTER — OFFICE VISIT (OUTPATIENT)
Dept: ONCOLOGY | Age: 84
End: 2024-05-30
Payer: MEDICARE

## 2024-05-30 VITALS
HEART RATE: 60 BPM | OXYGEN SATURATION: 96 % | SYSTOLIC BLOOD PRESSURE: 132 MMHG | BODY MASS INDEX: 28.46 KG/M2 | TEMPERATURE: 97.7 F | WEIGHT: 198.8 LBS | DIASTOLIC BLOOD PRESSURE: 60 MMHG | HEIGHT: 70 IN | RESPIRATION RATE: 18 BRPM

## 2024-05-30 VITALS
HEART RATE: 60 BPM | RESPIRATION RATE: 18 BRPM | DIASTOLIC BLOOD PRESSURE: 60 MMHG | TEMPERATURE: 97.7 F | OXYGEN SATURATION: 96 % | SYSTOLIC BLOOD PRESSURE: 132 MMHG

## 2024-05-30 DIAGNOSIS — D47.2 MGUS (MONOCLONAL GAMMOPATHY OF UNKNOWN SIGNIFICANCE): ICD-10-CM

## 2024-05-30 DIAGNOSIS — C90.20 EXTRAMEDULLARY PLASMACYTOMA NOT HAVING ACHIEVED REMISSION (HCC): ICD-10-CM

## 2024-05-30 DIAGNOSIS — C90.20 EXTRAMEDULLARY PLASMACYTOMA NOT HAVING ACHIEVED REMISSION (HCC): Primary | ICD-10-CM

## 2024-05-30 PROCEDURE — 99211 OFF/OP EST MAY X REQ PHY/QHP: CPT

## 2024-05-30 PROCEDURE — 99214 OFFICE O/P EST MOD 30 MIN: CPT | Performed by: INTERNAL MEDICINE

## 2024-05-30 PROCEDURE — 1123F ACP DISCUSS/DSCN MKR DOCD: CPT | Performed by: INTERNAL MEDICINE

## 2024-05-30 PROCEDURE — 84156 ASSAY OF PROTEIN URINE: CPT

## 2024-05-30 PROCEDURE — 84166 PROTEIN E-PHORESIS/URINE/CSF: CPT

## 2024-05-30 PROCEDURE — 86335 IMMUNFIX E-PHORSIS/URINE/CSF: CPT

## 2024-05-30 RX ORDER — MAG HYDROX/ALUMINUM HYD/SIMETH 400-400-40
450 SUSPENSION, ORAL (FINAL DOSE FORM) ORAL DAILY
COMMUNITY

## 2024-05-30 NOTE — PROGRESS NOTES
mediastinal nodes.  Representative subcarinal node 1.9 cm short axis with SUV of 4.9.  Nonenlarged left hilar node with SUV of 3.2.  Left inguinal node measuring 1 cm short axis with SUV of 4.6.  Not palpable on exam.  Indeterminate 9 mm left apical lung nodule.  No bone lesions.  10/22/2021 Dr. Peñaloza [facility] EBUS-unremarkable although final cytology from station 7 core biopsy pending      -Indeterminate 9 mm left apex lung nodule mildly FDG avid with SUV of 1.8.  Ongoing surveillance.     TREATMENT:  - completed radiation to the left neck per Dr. Ge 1/12/2022.  -s/p  [facility] EBUS for histology of the mediastinal nodes.  Subcarinal node likely the best target.  Path including subcarinal node all benign.     PARAMETERS:  -Neck CT 6/21/2022-no evidence of disease  -PET/CT 10/7/2021  -Chest CT with contrast 10/4/2022  -Left neck exam; node excised.  -Myeloma parameters ; serum light chains with elevated ratio.           Review of systems:  14 point system ROS was done and negative except for the positive pertinent history noted in subjective/ HPI.     Immunizations:  Immunization History   Administered Date(s) Administered    COVID-19, MODERNA BLUE border, Primary or Immunocompromised, (age 12y+), IM, 100 mcg/0.5mL 11/17/2021        Allergies:  Allergies   Allergen Reactions    No Known Allergies         Medications:  Current Outpatient Medications   Medication Sig Dispense Refill    atorvastatin (LIPITOR) 40 MG tablet Take 1 tablet by mouth nightly 90 tablet 3    clopidogrel (PLAVIX) 75 MG tablet Take 1 tablet by mouth daily 90 tablet 3    isosorbide mononitrate (IMDUR) 60 MG extended release tablet Take 1 tablet by mouth daily 90 tablet 3    metoprolol succinate (TOPROL XL) 50 MG extended release tablet Take 1 tablet by mouth daily 90 tablet 3    predniSONE (DELTASONE) 5 MG tablet Take 1 tablet by mouth daily 90 tablet 1    lisinopril (PRINIVIL;ZESTRIL) 20 MG tablet Take 1 tablet by mouth daily 90 tablet 3    
No

## 2024-06-05 LAB — PROTEIN ELECTROPHORESIS, URINE: NORMAL

## 2024-08-15 ENCOUNTER — HOSPITAL ENCOUNTER (OUTPATIENT)
Age: 84
Discharge: HOME | End: 2024-08-15
Payer: MEDICARE

## 2024-08-15 DIAGNOSIS — E78.5 DYSLIPIDEMIA (HIGH LDL; LOW HDL): ICD-10-CM

## 2024-08-15 DIAGNOSIS — I20.0 UNSTABLE ANGINA (HCC): ICD-10-CM

## 2024-08-15 LAB
ALBUMIN SERPL BCG-MCNC: 4.3 G/DL (ref 3.5–5.1)
ALP SERPL-CCNC: 82 U/L (ref 38–126)
ALT SERPL W/O P-5'-P-CCNC: 11 U/L (ref 11–66)
ANION GAP SERPL CALC-SCNC: 10 MEQ/L (ref 8–16)
AST SERPL-CCNC: 18 U/L (ref 5–40)
BILIRUB CONJ SERPL-MCNC: < 0.1 MG/DL (ref 0.1–13.8)
BILIRUB SERPL-MCNC: 0.4 MG/DL (ref 0.3–1.2)
BUN SERPL-MCNC: 28 MG/DL (ref 7–22)
CALCIUM SERPL-MCNC: 9.4 MG/DL (ref 8.5–10.5)
CHLORIDE SERPL-SCNC: 104 MEQ/L (ref 98–111)
CHOLEST SERPL-MCNC: 112 MG/DL (ref 100–199)
CO2 SERPL-SCNC: 25 MEQ/L (ref 23–33)
CREAT SERPL-MCNC: 1.5 MG/DL (ref 0.4–1.2)
DEPRECATED RDW RBC AUTO: 53.5 FL (ref 35–45)
ERYTHROCYTE [DISTWIDTH] IN BLOOD BY AUTOMATED COUNT: 15.8 % (ref 11.5–14.5)
GFR SERPL CREATININE-BSD FRML MDRD: 46 ML/MIN/1.73M2
GLUCOSE SERPL-MCNC: 106 MG/DL (ref 70–108)
HCT VFR BLD AUTO: 37 % (ref 42–52)
HDLC SERPL-MCNC: 38 MG/DL
HGB BLD-MCNC: 11.7 GM/DL (ref 14–18)
LDLC SERPL CALC-MCNC: 52 MG/DL
MCH RBC QN AUTO: 29.3 PG (ref 26–33)
MCHC RBC AUTO-ENTMCNC: 31.6 GM/DL (ref 32.2–35.5)
MCV RBC AUTO: 92.7 FL (ref 80–94)
PLATELET # BLD AUTO: 162 THOU/MM3 (ref 130–400)
PMV BLD AUTO: 10.5 FL (ref 9.4–12.4)
POTASSIUM SERPL-SCNC: 5.1 MEQ/L (ref 3.5–5.2)
PROT SERPL-MCNC: 7.3 G/DL (ref 6.1–8)
RBC # BLD AUTO: 3.99 MILL/MM3 (ref 4.7–6.1)
SODIUM SERPL-SCNC: 139 MEQ/L (ref 135–145)
TRIGL SERPL-MCNC: 110 MG/DL (ref 0–199)
WBC # BLD AUTO: 6.5 THOU/MM3 (ref 4.8–10.8)

## 2024-08-15 PROCEDURE — 80061 LIPID PANEL: CPT

## 2024-08-15 PROCEDURE — 80053 COMPREHEN METABOLIC PANEL: CPT

## 2024-08-15 PROCEDURE — 82248 BILIRUBIN DIRECT: CPT

## 2024-08-15 PROCEDURE — 85027 COMPLETE CBC AUTOMATED: CPT

## 2024-08-15 PROCEDURE — 36415 COLL VENOUS BLD VENIPUNCTURE: CPT

## 2024-08-22 ENCOUNTER — OFFICE VISIT (OUTPATIENT)
Dept: CARDIOLOGY CLINIC | Age: 84
End: 2024-08-22

## 2024-08-22 VITALS
HEART RATE: 76 BPM | HEIGHT: 69 IN | DIASTOLIC BLOOD PRESSURE: 70 MMHG | BODY MASS INDEX: 29.51 KG/M2 | WEIGHT: 199.2 LBS | SYSTOLIC BLOOD PRESSURE: 124 MMHG

## 2024-08-22 DIAGNOSIS — I20.0 UNSTABLE ANGINA (HCC): ICD-10-CM

## 2024-08-22 DIAGNOSIS — R42 DIZZINESS: Primary | ICD-10-CM

## 2024-08-22 DIAGNOSIS — E78.5 DYSLIPIDEMIA (HIGH LDL; LOW HDL): ICD-10-CM

## 2024-08-22 RX ORDER — ISOSORBIDE MONONITRATE 60 MG/1
60 TABLET, EXTENDED RELEASE ORAL DAILY
Qty: 90 TABLET | Refills: 3 | Status: SHIPPED | OUTPATIENT
Start: 2024-08-22

## 2024-08-22 RX ORDER — CLOPIDOGREL BISULFATE 75 MG/1
75 TABLET ORAL DAILY
Qty: 90 TABLET | Refills: 3 | Status: SHIPPED | OUTPATIENT
Start: 2024-08-22

## 2024-08-22 RX ORDER — NITROGLYCERIN 0.4 MG/1
0.4 TABLET SUBLINGUAL EVERY 5 MIN PRN
Qty: 25 TABLET | Refills: 3 | Status: SHIPPED | OUTPATIENT
Start: 2024-08-22

## 2024-08-22 RX ORDER — ATORVASTATIN CALCIUM 40 MG/1
40 TABLET, FILM COATED ORAL NIGHTLY
Qty: 90 TABLET | Refills: 3 | Status: SHIPPED | OUTPATIENT
Start: 2024-08-22

## 2024-08-22 RX ORDER — FAMOTIDINE 20 MG/1
20 TABLET, FILM COATED ORAL DAILY
COMMUNITY

## 2024-08-22 RX ORDER — METOPROLOL SUCCINATE 50 MG/1
50 TABLET, EXTENDED RELEASE ORAL DAILY
Qty: 90 TABLET | Refills: 3 | Status: SHIPPED | OUTPATIENT
Start: 2024-08-22

## 2024-08-22 NOTE — PATIENT INSTRUCTIONS
Your staff today were HOLLIS Feldman and BLAIRE Cedillo  Your provider today was Dr. Reyez  Phone number: [phone]      You may receive a survey regarding the care you received during your visit.  Your input is valuable to us.  We encourage you to complete and return your survey.  We hope you will choose us in the future for your healthcare needs.

## 2024-08-22 NOTE — PROGRESS NOTES
NP, former patient of Dr. Coker.   He has intermittent dizziness.     EKG completed.   
agitation, confusion, decreased concentration and dysphoric mood.     Objective:     /70   Pulse 76   Ht 1.753 m (5' 9\")   Wt 90.4 kg (199 lb 3.2 oz)   BMI 29.42 kg/m²     Wt Readings from Last 3 Encounters:   08/22/24 90.4 kg (199 lb 3.2 oz)   05/30/24 90.2 kg (198 lb 12.8 oz)   11/30/23 90.5 kg (199 lb 9.6 oz)     BP Readings from Last 3 Encounters:   08/22/24 124/70   05/30/24 132/60   05/30/24 132/60       Nursing note and vitals reviewed.    Physical Exam   Constitutional: Oriented to person, place, and time. Appears well-developed and well-nourished.   HENT:   Head: Normocephalic and atraumatic.   Eyes: EOM are normal. Pupils are equal, round, and reactive to light.   Neck: Normal range of motion. Neck supple. No JVD present.   Cardiovascular: Normal rate, regular rhythm, normal heart sounds and intact distal pulses.    No murmur heard.  Pulmonary/Chest: Effort normal and breath sounds normal. No respiratory distress. No wheezes. No rales.   Abdominal: Soft. Bowel sounds are normal. No distension. There is no tenderness.   Musculoskeletal: Normal range of motion. No edema.   Neurological: Alert and oriented to person, place, and time. No cranial nerve deficit. Coordination normal.   Skin: Skin is warm and dry.   Psychiatric: Normal mood and affect.       No results found for: \"CKTOTAL\", \"CKMB\", \"CKMBINDEX\"    Lab Results   Component Value Date/Time    WBC 6.5 08/15/2024 09:48 AM    RBC 3.99 08/15/2024 09:48 AM    HGB 11.7 08/15/2024 09:48 AM    HCT 37.0 08/15/2024 09:48 AM    MCV 92.7 08/15/2024 09:48 AM    MCH 29.3 08/15/2024 09:48 AM    MCHC 31.6 08/15/2024 09:48 AM    RDW 16.1 02/23/2024 11:08 AM     08/15/2024 09:48 AM    MPV 10.5 08/15/2024 09:48 AM       Lab Results   Component Value Date/Time     08/15/2024 09:48 AM    K 5.1 08/15/2024 09:48 AM    K 4.3 02/17/2023 05:57 AM     08/15/2024 09:48 AM    CO2 25 08/15/2024 09:48 AM    BUN 28 08/15/2024 09:48 AM    CREATININE 1.5

## 2024-11-22 ENCOUNTER — HOSPITAL ENCOUNTER (OUTPATIENT)
Dept: PET IMAGING | Age: 84
Discharge: HOME | End: 2024-11-22
Attending: INTERNAL MEDICINE
Payer: MEDICARE

## 2024-11-22 DIAGNOSIS — C90.20 EXTRAMEDULLARY PLASMACYTOMA NOT HAVING ACHIEVED REMISSION (HCC): ICD-10-CM

## 2024-11-22 DIAGNOSIS — D47.2 MGUS (MONOCLONAL GAMMOPATHY OF UNKNOWN SIGNIFICANCE): ICD-10-CM

## 2024-11-22 PROCEDURE — 78815 PET IMAGE W/CT SKULL-THIGH: CPT

## 2024-11-22 PROCEDURE — A9609 HC RX DIAGNOSTIC RADIOPHARMACEUTICAL: HCPCS | Performed by: INTERNAL MEDICINE

## 2024-11-22 PROCEDURE — 3430000000 HC RX DIAGNOSTIC RADIOPHARMACEUTICAL: Performed by: INTERNAL MEDICINE

## 2024-11-22 RX ORDER — FLUDEOXYGLUCOSE F 18 200 MCI/ML
9.7 INJECTION, SOLUTION INTRAVENOUS
Status: COMPLETED | OUTPATIENT
Start: 2024-11-22 | End: 2024-11-22

## 2024-11-22 RX ADMIN — FLUDEOXYGLUCOSE F 18 9.7 MILLICURIE: 200 INJECTION, SOLUTION INTRAVENOUS at 07:29

## 2024-11-26 ENCOUNTER — HOSPITAL ENCOUNTER (OUTPATIENT)
Age: 84
Discharge: HOME | End: 2024-11-26
Payer: MEDICARE

## 2024-11-26 PROCEDURE — 86335 IMMUNFIX E-PHORSIS/URINE/CSF: CPT

## 2024-11-26 PROCEDURE — 84156 ASSAY OF PROTEIN URINE: CPT

## 2024-11-26 PROCEDURE — 84166 PROTEIN E-PHORESIS/URINE/CSF: CPT

## 2024-12-01 LAB — PROTEIN ELECTROPHORESIS, URINE: NORMAL

## 2024-12-02 ENCOUNTER — HOSPITAL ENCOUNTER (OUTPATIENT)
Dept: INFUSION THERAPY | Age: 84
Discharge: HOME | End: 2024-12-02
Payer: MEDICARE

## 2024-12-02 ENCOUNTER — OFFICE VISIT (OUTPATIENT)
Dept: ONCOLOGY | Age: 84
End: 2024-12-02
Payer: MEDICARE

## 2024-12-02 VITALS
OXYGEN SATURATION: 97 % | SYSTOLIC BLOOD PRESSURE: 140 MMHG | BODY MASS INDEX: 29.18 KG/M2 | TEMPERATURE: 97.6 F | HEIGHT: 69 IN | HEART RATE: 69 BPM | WEIGHT: 197 LBS | RESPIRATION RATE: 16 BRPM | DIASTOLIC BLOOD PRESSURE: 75 MMHG

## 2024-12-02 VITALS
DIASTOLIC BLOOD PRESSURE: 75 MMHG | TEMPERATURE: 97.6 F | SYSTOLIC BLOOD PRESSURE: 140 MMHG | RESPIRATION RATE: 16 BRPM | HEART RATE: 69 BPM | OXYGEN SATURATION: 97 %

## 2024-12-02 DIAGNOSIS — R91.8 LUNG MASS: Primary | ICD-10-CM

## 2024-12-02 DIAGNOSIS — C90.20 EXTRAMEDULLARY PLASMACYTOMA NOT HAVING ACHIEVED REMISSION (HCC): ICD-10-CM

## 2024-12-02 LAB
BASOPHILS ABSOLUTE: 0 THOU/MM3 (ref 0–0.1)
BASOPHILS NFR BLD AUTO: 0.1 %
DEPRECATED RDW RBC AUTO: 53 FL (ref 35–45)
EOSINOPHIL NFR BLD AUTO: 0.5 %
EOSINOPHILS ABSOLUTE: 0 THOU/MM3 (ref 0–0.4)
ERYTHROCYTE [DISTWIDTH] IN BLOOD BY AUTOMATED COUNT: 15.7 % (ref 11.5–14.5)
FREE KAPPA/LAMBDA RATIO: 1.63 (ref 0.22–1.74)
HCT VFR BLD AUTO: 33.7 % (ref 42–52)
HGB BLD-MCNC: 10.6 GM/DL (ref 14–18)
IGA SERPL-MCNC: 139 MG/DL (ref 70–400)
IGG SERPL-MCNC: 1051 MG/DL (ref 700–1600)
IGM SERPL-MCNC: 63 MG/DL (ref 40–230)
IMM GRANULOCYTES # BLD AUTO: 0.08 THOU/MM3 (ref 0–0.07)
IMM GRANULOCYTES NFR BLD AUTO: 1 %
KAPPA LC FREE SER-MCNC: 45.9 MG/L
LAMBDA LC FREE SERPL-MCNC: 28.2 MG/L (ref 4.2–27.7)
LYMPHOCYTES ABSOLUTE: 0.9 THOU/MM3 (ref 1–4.8)
LYMPHOCYTES NFR BLD AUTO: 11.7 %
MCH RBC QN AUTO: 29 PG (ref 26–33)
MCHC RBC AUTO-ENTMCNC: 31.5 GM/DL (ref 32.2–35.5)
MCV RBC AUTO: 92.1 FL (ref 80–94)
MONOCYTES ABSOLUTE: 0.8 THOU/MM3 (ref 0.4–1.3)
MONOCYTES NFR BLD AUTO: 10.5 %
NEUTROPHILS ABSOLUTE: 6 THOU/MM3 (ref 1.8–7.7)
NEUTROPHILS NFR BLD AUTO: 76.2 %
NRBC BLD AUTO-RTO: 0 /100 WBC
PLATELET # BLD AUTO: 174 THOU/MM3 (ref 130–400)
PMV BLD AUTO: 11.3 FL (ref 9.4–12.4)
RBC # BLD AUTO: 3.66 MILL/MM3 (ref 4.7–6.1)
WBC # BLD AUTO: 7.9 THOU/MM3 (ref 4.8–10.8)

## 2024-12-02 PROCEDURE — 99214 OFFICE O/P EST MOD 30 MIN: CPT | Performed by: INTERNAL MEDICINE

## 2024-12-02 PROCEDURE — 83883 ASSAY NEPHELOMETRY NOT SPEC: CPT

## 2024-12-02 PROCEDURE — G8417 CALC BMI ABV UP PARAM F/U: HCPCS | Performed by: INTERNAL MEDICINE

## 2024-12-02 PROCEDURE — 1123F ACP DISCUSS/DSCN MKR DOCD: CPT | Performed by: INTERNAL MEDICINE

## 2024-12-02 PROCEDURE — 85025 COMPLETE CBC W/AUTO DIFF WBC: CPT

## 2024-12-02 PROCEDURE — 82784 ASSAY IGA/IGD/IGG/IGM EACH: CPT

## 2024-12-02 PROCEDURE — 1159F MED LIST DOCD IN RCRD: CPT | Performed by: INTERNAL MEDICINE

## 2024-12-02 PROCEDURE — G8427 DOCREV CUR MEDS BY ELIG CLIN: HCPCS | Performed by: INTERNAL MEDICINE

## 2024-12-02 PROCEDURE — 86334 IMMUNOFIX E-PHORESIS SERUM: CPT

## 2024-12-02 PROCEDURE — 84155 ASSAY OF PROTEIN SERUM: CPT

## 2024-12-02 PROCEDURE — 36415 COLL VENOUS BLD VENIPUNCTURE: CPT

## 2024-12-02 PROCEDURE — 99211 OFF/OP EST MAY X REQ PHY/QHP: CPT

## 2024-12-02 PROCEDURE — 1036F TOBACCO NON-USER: CPT | Performed by: INTERNAL MEDICINE

## 2024-12-02 PROCEDURE — G8484 FLU IMMUNIZE NO ADMIN: HCPCS | Performed by: INTERNAL MEDICINE

## 2024-12-02 PROCEDURE — 84165 PROTEIN E-PHORESIS SERUM: CPT

## 2024-12-02 PROCEDURE — 1125F AMNT PAIN NOTED PAIN PRSNT: CPT | Performed by: INTERNAL MEDICINE

## 2024-12-02 NOTE — PATIENT INSTRUCTIONS
Orders Placed This Encounter   Procedures    CBC with Auto Differential    Immunofixation w/ Quant    Kappa/Lambda Quantitative Free Light Chains, Serum    IgA    IgG    IgM    Ambulatory referral to Pulmonology   Please call Dr. Comer and request for a next available appointment( previous patient).  Return in about 3 weeks (around 12/23/2024). ( Please place the patient on Dr. Palencia's schedule)+ to follow up on recommendations from pulmonology and review labs.  Pt will need labs today.

## 2024-12-03 NOTE — PROGRESS NOTES
[facility] [place] [facility]  [facility]  [address]  [address]  [place] [id number]  Dept: [phone]  Loc: [phone]   Hematology/Oncology Progress Note (Clinic)        Bruce Yap  1940  No ref. provider found   Sergio Mosquera MD     Diagnosis/CC:   Follow up on Plasmacytoma    HPI:    Patient is coming today for 6 months follow-up with his wife.  He has history of plasmacytoma diagnosed 2021 status postradiation.  History of lung nodule under surveillance.  History of anemia related to chronic kidney disease.  He has been in remission without any evidence of progression.  Routine labs performed on May 23, 2024 came back stable and no evidence of progression.  Kidney function stable anemia is a stable he has history of lung nodule and CAT scan of the chest performed on May 23, 2024 and showed a stable lung nodule.  In the left upper lobe measures 14 x 17 mm.  No new lung nodules or chest adenopathy.  Bony structures of the chest within normal limits.     He has been stable.  He reported no concerns or pain in regard to growing masses in his neck or elsewhere in his body.  No peripheral lymphadenopathy.  No drenching night sweats.  No loss of weight or loss of appetite.  He denied any chest pain cough or hemoptysis.  He has minimal peripheral neuropathy in the hands.  He denies any headache dizziness or blurring of vision.    He has generalized arthralgias.  Peripheral neuropathy.        Oncology History :      DIAGNOSIS:  -Extramedullary plasmacytoma left neck (biopsy and t path at [facility]) 8/24/2021.  Bone marrow biopsy-uninvolved.     IgA 259/normal [id number]/minimally elevated, IgM 93/normal  Beta-2 microglobulin 3.1/mildly elevated  Serum immunofixation shows normal pattern with no monoclonal proteins.  Serum free kappa light chains 84.82, lambda 37.03, ratio elevated at 2.29 (0.26-1.65)     -Abnormal PET scan (10/7/2021):  with multiple FDG avid mediastinal

## 2024-12-05 LAB — IMMUNOFIXATION WITH QUANT: NORMAL

## 2024-12-23 ENCOUNTER — OFFICE VISIT (OUTPATIENT)
Dept: ONCOLOGY | Age: 84
End: 2024-12-23
Payer: MEDICARE

## 2024-12-23 ENCOUNTER — HOSPITAL ENCOUNTER (OUTPATIENT)
Dept: INFUSION THERAPY | Age: 84
Discharge: HOME | End: 2024-12-23
Payer: MEDICARE

## 2024-12-23 VITALS
BODY MASS INDEX: 29.18 KG/M2 | HEART RATE: 69 BPM | WEIGHT: 197 LBS | HEIGHT: 69 IN | TEMPERATURE: 97.8 F | RESPIRATION RATE: 18 BRPM | SYSTOLIC BLOOD PRESSURE: 160 MMHG | OXYGEN SATURATION: 98 % | DIASTOLIC BLOOD PRESSURE: 77 MMHG

## 2024-12-23 VITALS
RESPIRATION RATE: 18 BRPM | HEIGHT: 69 IN | BODY MASS INDEX: 29.18 KG/M2 | DIASTOLIC BLOOD PRESSURE: 77 MMHG | OXYGEN SATURATION: 98 % | HEART RATE: 69 BPM | WEIGHT: 197 LBS | SYSTOLIC BLOOD PRESSURE: 160 MMHG | TEMPERATURE: 97.8 F

## 2024-12-23 DIAGNOSIS — C90.20 EXTRAMEDULLARY PLASMACYTOMA NOT HAVING ACHIEVED REMISSION (HCC): ICD-10-CM

## 2024-12-23 DIAGNOSIS — C90.20 EXTRAMEDULLARY PLASMACYTOMA NOT HAVING ACHIEVED REMISSION (HCC): Primary | ICD-10-CM

## 2024-12-23 LAB
FERRITIN SERPL IA-MCNC: 181 NG/ML (ref 22–322)
FOLATE SERPL-MCNC: > 20 NG/ML (ref 4.8–24.2)
HGB RETIC QN AUTO: 31.7 PG (ref 28.2–35.7)
IMM RETICS NFR: 20.7 % (ref 2.3–13.4)
IRON SATN MFR SERPL: 18 % (ref 20–50)
IRON SERPL-MCNC: 46 UG/DL (ref 65–195)
RETICS # AUTO: 79 THOU/MM3 (ref 20–115)
RETICS/RBC NFR AUTO: 2.2 % (ref 0.5–2)
TIBC SERPL-MCNC: 250 UG/DL (ref 171–450)
VIT B12 SERPL-MCNC: 438 PG/ML (ref 211–911)

## 2024-12-23 PROCEDURE — G8484 FLU IMMUNIZE NO ADMIN: HCPCS | Performed by: INTERNAL MEDICINE

## 2024-12-23 PROCEDURE — 82728 ASSAY OF FERRITIN: CPT

## 2024-12-23 PROCEDURE — 99214 OFFICE O/P EST MOD 30 MIN: CPT | Performed by: INTERNAL MEDICINE

## 2024-12-23 PROCEDURE — G8417 CALC BMI ABV UP PARAM F/U: HCPCS | Performed by: INTERNAL MEDICINE

## 2024-12-23 PROCEDURE — 1159F MED LIST DOCD IN RCRD: CPT | Performed by: INTERNAL MEDICINE

## 2024-12-23 PROCEDURE — 1126F AMNT PAIN NOTED NONE PRSNT: CPT | Performed by: INTERNAL MEDICINE

## 2024-12-23 PROCEDURE — G8427 DOCREV CUR MEDS BY ELIG CLIN: HCPCS | Performed by: INTERNAL MEDICINE

## 2024-12-23 PROCEDURE — 85046 RETICYTE/HGB CONCENTRATE: CPT

## 2024-12-23 PROCEDURE — 83540 ASSAY OF IRON: CPT

## 2024-12-23 PROCEDURE — 82746 ASSAY OF FOLIC ACID SERUM: CPT

## 2024-12-23 PROCEDURE — 83550 IRON BINDING TEST: CPT

## 2024-12-23 PROCEDURE — 82607 VITAMIN B-12: CPT

## 2024-12-23 PROCEDURE — 99211 OFF/OP EST MAY X REQ PHY/QHP: CPT

## 2024-12-23 PROCEDURE — 1036F TOBACCO NON-USER: CPT | Performed by: INTERNAL MEDICINE

## 2024-12-23 PROCEDURE — 1123F ACP DISCUSS/DSCN MKR DOCD: CPT | Performed by: INTERNAL MEDICINE

## 2024-12-23 PROCEDURE — 36415 COLL VENOUS BLD VENIPUNCTURE: CPT

## 2024-12-23 NOTE — PROGRESS NOTES
to call for questions or concerns.        NOTE: This report was transcribed using voice recognition software.  Every effort was made to ensure accuracy; however, inadvertent computerized transcription errors may be present.     Angel Garcia MD

## 2025-01-23 ENCOUNTER — OFFICE VISIT (OUTPATIENT)
Dept: PULMONOLOGY | Age: 85
End: 2025-01-23
Payer: MEDICARE

## 2025-01-23 VITALS
BODY MASS INDEX: 29.33 KG/M2 | WEIGHT: 198 LBS | SYSTOLIC BLOOD PRESSURE: 124 MMHG | DIASTOLIC BLOOD PRESSURE: 68 MMHG | OXYGEN SATURATION: 96 % | HEART RATE: 71 BPM | HEIGHT: 69 IN | TEMPERATURE: 97.5 F

## 2025-01-23 DIAGNOSIS — R59.0 LAD (LYMPHADENOPATHY), MEDIASTINAL: ICD-10-CM

## 2025-01-23 DIAGNOSIS — C90.31 PLASMACYTOMA IN REMISSION, UNSPECIFIED PLASMACYTOMA TYPE (HCC): ICD-10-CM

## 2025-01-23 DIAGNOSIS — R91.1 LUNG NODULE SEEN ON IMAGING STUDY: Primary | ICD-10-CM

## 2025-01-23 DIAGNOSIS — J84.10 GRANULOMATOUS LUNG DISEASE (HCC): ICD-10-CM

## 2025-01-23 PROCEDURE — 1159F MED LIST DOCD IN RCRD: CPT | Performed by: INTERNAL MEDICINE

## 2025-01-23 PROCEDURE — 99204 OFFICE O/P NEW MOD 45 MIN: CPT | Performed by: INTERNAL MEDICINE

## 2025-01-23 PROCEDURE — 1123F ACP DISCUSS/DSCN MKR DOCD: CPT | Performed by: INTERNAL MEDICINE

## 2025-01-23 NOTE — PROGRESS NOTES
[facility]    Patient: AMAYA BEARD, 85 y.o.   : 1940    Patient of Sergio Mosquera MD   Referring Provider: Belinda Mast MD       Subjective     Chief Complaint   Patient presents with    New Patient     New patient, referred for Lung Mass by Dr. Mast.   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  In  EBUS bronchoscopy was completed with sampling of mediastinal lymph nodes revealing benign lymphocytes.    Patient is a retired smoker quit about 10 years ago, otherwise has no respiratory issues    Immunizations:  Immunization History   Administered Date(s) Administered    COVID-19, MODERNA BLUE border, Primary or Immunocompromised, (age 12y+), IM, 100 mcg/0.5mL 2021    COVID-19, PFIZER, (age 12y+), IM, 30mcg/0.3mL 2024      Past Medical hx   PMH:  Past Medical History:   Diagnosis Date    CAD (coronary artery disease)     COVID-19     Enlarged lymph nodes     Heart disease     Hyperlipidemia     Hypertension     Plasmacytoma (HCC)      SURGICAL HISTORY:  Past Surgical History:   Procedure Laterality Date    ABSCESS DRAINAGE      SPIDER BITE    BRONCHOSCOPY N/A 10/22/2021    BRONCHOSCOPY W/EBUS FNA performed by Jose Eduardo Comer MD at [facility]    CAROTID STENT PLACEMENT  2004    x2    COLONOSCOPY      CT BIOPSY BONE MARROW  2021    CT BONE MARROW BIOPSY 2021 [facility] CT SCAN    HERNIA REPAIR  2016     SOCIAL HISTORY:  Social History     Tobacco Use    Smoking status: Former

## 2025-03-10 ENCOUNTER — OFFICE VISIT (OUTPATIENT)
Dept: FAMILY MEDICINE CLINIC | Age: 85
End: 2025-03-10
Payer: MEDICARE

## 2025-03-10 VITALS
OXYGEN SATURATION: 96 % | WEIGHT: 196 LBS | TEMPERATURE: 98.3 F | DIASTOLIC BLOOD PRESSURE: 64 MMHG | BODY MASS INDEX: 28.94 KG/M2 | SYSTOLIC BLOOD PRESSURE: 136 MMHG | HEART RATE: 76 BPM | RESPIRATION RATE: 18 BRPM

## 2025-03-10 DIAGNOSIS — R53.83 OTHER FATIGUE: Primary | ICD-10-CM

## 2025-03-10 LAB
Lab: NORMAL
QC PASS/FAIL: NORMAL
SARS-COV-2 RDRP RESP QL NAA+PROBE: NEGATIVE

## 2025-03-10 PROCEDURE — 1123F ACP DISCUSS/DSCN MKR DOCD: CPT | Performed by: STUDENT IN AN ORGANIZED HEALTH CARE EDUCATION/TRAINING PROGRAM

## 2025-03-10 PROCEDURE — G8427 DOCREV CUR MEDS BY ELIG CLIN: HCPCS | Performed by: STUDENT IN AN ORGANIZED HEALTH CARE EDUCATION/TRAINING PROGRAM

## 2025-03-10 PROCEDURE — 1036F TOBACCO NON-USER: CPT | Performed by: STUDENT IN AN ORGANIZED HEALTH CARE EDUCATION/TRAINING PROGRAM

## 2025-03-10 PROCEDURE — 99202 OFFICE O/P NEW SF 15 MIN: CPT | Performed by: STUDENT IN AN ORGANIZED HEALTH CARE EDUCATION/TRAINING PROGRAM

## 2025-03-10 PROCEDURE — 87635 SARS-COV-2 COVID-19 AMP PRB: CPT | Performed by: STUDENT IN AN ORGANIZED HEALTH CARE EDUCATION/TRAINING PROGRAM

## 2025-03-10 PROCEDURE — 1159F MED LIST DOCD IN RCRD: CPT | Performed by: STUDENT IN AN ORGANIZED HEALTH CARE EDUCATION/TRAINING PROGRAM

## 2025-03-10 PROCEDURE — G8417 CALC BMI ABV UP PARAM F/U: HCPCS | Performed by: STUDENT IN AN ORGANIZED HEALTH CARE EDUCATION/TRAINING PROGRAM

## 2025-03-10 ASSESSMENT — PATIENT HEALTH QUESTIONNAIRE - PHQ9
SUM OF ALL RESPONSES TO PHQ QUESTIONS 1-9: 1
2. FEELING DOWN, DEPRESSED OR HOPELESS: SEVERAL DAYS
1. LITTLE INTEREST OR PLEASURE IN DOING THINGS: NOT AT ALL

## 2025-03-10 NOTE — PROGRESS NOTES
[facility]  [address] [address]  [phone]     Bruce Yap is a 85 y.o. male who presents today for:  Chief Complaint   Patient presents with    Fatigue     X 2-3 days     Nasal Congestion       Assessment/Plan:     Bruce was seen today for fatigue and nasal congestion.    Diagnoses and all orders for this visit:    Other fatigue  -     POCT COVID-19 Rapid, NAAT      Fatigue: Acute/uncontrolled, COVID-negative, discussed with patient this is most likely viral URI, patient declined other testing at this time.  Did recommend Flonase two puffs per nostril twice daily for one week then once daily for one week for nasal congestion as well as supportive care recommendations.           No follow-ups on file.      Medications Prescribed:  No orders of the defined types were placed in this encounter.      Future Appointments   Date Time Provider Department Center   4/16/2025 11:00 AM STR CT IMAGING RM1  [facility] EXP STR Rad/Card   4/23/2025 10:00 AM Jose Eduardo Comer MD N Pulm Med [facility]   6/23/2025 10:30 AM Angel Garcia MD N Oncology [facility]   8/26/2025  9:30 AM Carmelo Ray, ONEL N [facility]       HPI:     HPI  85-year-old male with past medical history significant for extra medullary plasmacytoma, heart disease, HTN, HLD, obesity, unstable angina who presents as a walk-in visit for fatigue and nasal congestion.    Symptoms have been ongoing for the last 2 to 3 days    No sick contacts. No fevers.         Subjective:      Review of Systems   Constitutional:  Positive for fatigue. Negative for chills, diaphoresis and fever.   HENT:  Positive for congestion and rhinorrhea. Negative for sinus pressure, sinus pain and sore throat.    Respiratory:  Negative for cough and shortness of breath.    Cardiovascular:  Negative for chest pain and leg swelling.   Gastrointestinal:  Negative for

## 2025-03-11 ASSESSMENT — ENCOUNTER SYMPTOMS
SINUS PAIN: 0
RHINORRHEA: 1
DIARRHEA: 0
NAUSEA: 0
SINUS PRESSURE: 0
SORE THROAT: 0
CONSTIPATION: 0
SHORTNESS OF BREATH: 0
VOMITING: 0
COUGH: 0

## 2025-04-16 ENCOUNTER — HOSPITAL ENCOUNTER (OUTPATIENT)
Dept: CT IMAGING | Age: 85
Discharge: HOME | End: 2025-04-16
Attending: INTERNAL MEDICINE
Payer: MEDICARE

## 2025-04-16 DIAGNOSIS — R91.1 LUNG NODULE SEEN ON IMAGING STUDY: ICD-10-CM

## 2025-04-16 DIAGNOSIS — R59.0 LAD (LYMPHADENOPATHY), MEDIASTINAL: ICD-10-CM

## 2025-04-16 DIAGNOSIS — C90.31 PLASMACYTOMA IN REMISSION, UNSPECIFIED PLASMACYTOMA TYPE (HCC): ICD-10-CM

## 2025-04-16 LAB — POC CREATININE WHOLE BLOOD: 1.5 MG/DL (ref 0.5–1.2)

## 2025-04-16 PROCEDURE — 71260 CT THORAX DX C+: CPT

## 2025-04-16 PROCEDURE — 82565 ASSAY OF CREATININE: CPT

## 2025-04-16 PROCEDURE — 6360000004 HC RX CONTRAST MEDICATION: Performed by: INTERNAL MEDICINE

## 2025-04-16 RX ORDER — IOPAMIDOL 755 MG/ML
80 INJECTION, SOLUTION INTRAVASCULAR
Status: COMPLETED | OUTPATIENT
Start: 2025-04-16 | End: 2025-04-16

## 2025-04-16 RX ADMIN — IOPAMIDOL 80 ML: 755 INJECTION, SOLUTION INTRAVENOUS at 10:51

## 2025-04-23 ENCOUNTER — OFFICE VISIT (OUTPATIENT)
Dept: PULMONOLOGY | Age: 85
End: 2025-04-23
Payer: MEDICARE

## 2025-04-23 VITALS
DIASTOLIC BLOOD PRESSURE: 82 MMHG | HEIGHT: 70 IN | HEART RATE: 63 BPM | WEIGHT: 199 LBS | OXYGEN SATURATION: 96 % | TEMPERATURE: 97.8 F | SYSTOLIC BLOOD PRESSURE: 124 MMHG | BODY MASS INDEX: 28.49 KG/M2

## 2025-04-23 DIAGNOSIS — R91.1 LUNG NODULE, SOLITARY: Primary | ICD-10-CM

## 2025-04-23 PROCEDURE — G8427 DOCREV CUR MEDS BY ELIG CLIN: HCPCS | Performed by: INTERNAL MEDICINE

## 2025-04-23 PROCEDURE — 99213 OFFICE O/P EST LOW 20 MIN: CPT | Performed by: INTERNAL MEDICINE

## 2025-04-23 PROCEDURE — 1159F MED LIST DOCD IN RCRD: CPT | Performed by: INTERNAL MEDICINE

## 2025-04-23 PROCEDURE — 1123F ACP DISCUSS/DSCN MKR DOCD: CPT | Performed by: INTERNAL MEDICINE

## 2025-04-23 PROCEDURE — 1036F TOBACCO NON-USER: CPT | Performed by: INTERNAL MEDICINE

## 2025-04-23 PROCEDURE — G8417 CALC BMI ABV UP PARAM F/U: HCPCS | Performed by: INTERNAL MEDICINE

## 2025-04-23 NOTE — PROGRESS NOTES
[facility]    Patient: BRUCE BEARD, 85 y.o.   : 1940    Patient of Sergio Mosquera MD   Referring Provider: No ref. 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  In  EBUS bronchoscopy was completed with sampling of mediastinal lymph nodes revealing benign lymphocytes.    Patient is a retired smoker quit about 10 years ago, otherwise has no respiratory issues    Immunizations:  Immunization History   Administered Date(s) Administered    COVID-19, MODERNA BLUE border, Primary or Immunocompromised, (age 12y+), IM, 100 mcg/0.5mL 2021    COVID-19, PFIZER, , (age 12y+), IM, 30mcg/0.3mL 2024    Influenza, FLUZONE High Dose (age 65 y+), IM, Quadv, 0.7mL 2023    Influenza, FLUZONE High Dose, (age 65 y+), IM, Trivalent PF, 0.5mL 2024    Pneumococcal, PCV-13, PREVNAR 13, (age 6w+), IM, 0.5mL 2016    Td vaccine (adult) 10/30/2009    Zoster Live (Zostavax) 2014      Past Medical hx   PMH:  Past Medical History:

## 2025-04-23 NOTE — PROGRESS NOTES
Neck Circumference - 13.25  Mallampati - 3    Lung Nodule Screening     [] Qualifies    [x] Does not qualify   [] Declined    [] Completed

## 2025-06-23 ENCOUNTER — OFFICE VISIT (OUTPATIENT)
Dept: ONCOLOGY | Age: 85
End: 2025-06-23
Payer: MEDICARE

## 2025-06-23 ENCOUNTER — HOSPITAL ENCOUNTER (OUTPATIENT)
Dept: INFUSION THERAPY | Age: 85
Discharge: HOME | End: 2025-06-23
Payer: MEDICARE

## 2025-06-23 VITALS
BODY MASS INDEX: 28 KG/M2 | TEMPERATURE: 98.1 F | SYSTOLIC BLOOD PRESSURE: 155 MMHG | OXYGEN SATURATION: 97 % | HEIGHT: 70 IN | RESPIRATION RATE: 20 BRPM | DIASTOLIC BLOOD PRESSURE: 71 MMHG | WEIGHT: 195.6 LBS | HEART RATE: 66 BPM

## 2025-06-23 VITALS
DIASTOLIC BLOOD PRESSURE: 71 MMHG | OXYGEN SATURATION: 97 % | RESPIRATION RATE: 20 BRPM | SYSTOLIC BLOOD PRESSURE: 155 MMHG | HEART RATE: 66 BPM | TEMPERATURE: 98.1 F

## 2025-06-23 DIAGNOSIS — C90.20 EXTRAMEDULLARY PLASMACYTOMA NOT HAVING ACHIEVED REMISSION (HCC): ICD-10-CM

## 2025-06-23 DIAGNOSIS — C90.20 EXTRAMEDULLARY PLASMACYTOMA NOT HAVING ACHIEVED REMISSION (HCC): Primary | ICD-10-CM

## 2025-06-23 LAB
ALBUMIN SERPL BCG-MCNC: 4.2 G/DL (ref 3.4–4.9)
ALP SERPL-CCNC: 84 U/L (ref 40–129)
ALT SERPL W/O P-5'-P-CCNC: 16 U/L (ref 10–50)
AST SERPL-CCNC: 25 U/L (ref 10–50)
BASOPHILS ABSOLUTE: 0 THOU/MM3 (ref 0–0.1)
BASOPHILS NFR BLD AUTO: 0 % (ref 0–3)
BILIRUB CONJ SERPL-MCNC: 0.2 MG/DL (ref 0–0.2)
BILIRUB SERPL-MCNC: 0.5 MG/DL (ref 0.3–1.2)
BUN BLDP-MCNC: 25 MG/DL (ref 8–26)
CHLORIDE BLD-SCNC: 109 MEQ/L (ref 98–109)
CREAT BLD-MCNC: 1.4 MG/DL (ref 0.5–1.2)
EOSINOPHIL NFR BLD AUTO: 0 % (ref 0–4)
EOSINOPHILS ABSOLUTE: 0 THOU/MM3 (ref 0–0.4)
ERYTHROCYTE [DISTWIDTH] IN BLOOD BY AUTOMATED COUNT: 15.3 % (ref 11.5–14.5)
GFR SERPL CREATININE-BSD FRML MDRD: 50 ML/MIN/1.73M2
GLUCOSE BLD-MCNC: 100 MG/DL (ref 70–108)
HCT VFR BLD AUTO: 35 % (ref 42–52)
HGB BLD-MCNC: 11.1 GM/DL (ref 14–18)
IMMATURE GRANULOCYTES %: 0 %
IMMATURE GRANULOCYTES ABSOLUTE: 0.03 THOU/MM3 (ref 0–0.07)
IONIZED CALCIUM, WHOLE BLOOD: 1.3 MMOL/L (ref 1.12–1.32)
LYMPHOCYTES ABSOLUTE: 0.8 THOU/MM3 (ref 1–4.8)
LYMPHOCYTES NFR BLD AUTO: 11 % (ref 15–47)
MCH RBC QN AUTO: 28.7 PG (ref 26–33)
MCHC RBC AUTO-ENTMCNC: 31.7 GM/DL (ref 32.2–35.5)
MCV RBC AUTO: 90 FL (ref 80–94)
MONOCYTES ABSOLUTE: 0.6 THOU/MM3 (ref 0.4–1.3)
MONOCYTES NFR BLD AUTO: 9 % (ref 0–12)
NEUTROPHILS ABSOLUTE: 5.6 THOU/MM3 (ref 1.8–7.7)
NEUTROPHILS NFR BLD AUTO: 80 % (ref 43–75)
PLATELET # BLD AUTO: 176 THOU/MM3 (ref 130–400)
PMV BLD AUTO: 10.3 FL (ref 9.4–12.4)
POTASSIUM BLD-SCNC: 4.5 MEQ/L (ref 3.5–4.9)
PROT SERPL-MCNC: 7.2 G/DL (ref 6.4–8.3)
RBC # BLD AUTO: 3.87 MILL/MM3 (ref 4.7–6.1)
SODIUM BLD-SCNC: 145 MEQ/L (ref 138–146)
TOTAL CO2, WHOLE BLOOD: 31 MEQ/L (ref 23–33)
WBC # BLD AUTO: 7 THOU/MM3 (ref 4.8–10.8)

## 2025-06-23 PROCEDURE — 1036F TOBACCO NON-USER: CPT | Performed by: INTERNAL MEDICINE

## 2025-06-23 PROCEDURE — 1159F MED LIST DOCD IN RCRD: CPT | Performed by: INTERNAL MEDICINE

## 2025-06-23 PROCEDURE — 84165 PROTEIN E-PHORESIS SERUM: CPT

## 2025-06-23 PROCEDURE — 36415 COLL VENOUS BLD VENIPUNCTURE: CPT

## 2025-06-23 PROCEDURE — 80076 HEPATIC FUNCTION PANEL: CPT

## 2025-06-23 PROCEDURE — G8417 CALC BMI ABV UP PARAM F/U: HCPCS | Performed by: INTERNAL MEDICINE

## 2025-06-23 PROCEDURE — 99211 OFF/OP EST MAY X REQ PHY/QHP: CPT

## 2025-06-23 PROCEDURE — G8427 DOCREV CUR MEDS BY ELIG CLIN: HCPCS | Performed by: INTERNAL MEDICINE

## 2025-06-23 PROCEDURE — 84155 ASSAY OF PROTEIN SERUM: CPT

## 2025-06-23 PROCEDURE — 83883 ASSAY NEPHELOMETRY NOT SPEC: CPT

## 2025-06-23 PROCEDURE — 80047 BASIC METABLC PNL IONIZED CA: CPT

## 2025-06-23 PROCEDURE — 99214 OFFICE O/P EST MOD 30 MIN: CPT | Performed by: INTERNAL MEDICINE

## 2025-06-23 PROCEDURE — 1126F AMNT PAIN NOTED NONE PRSNT: CPT | Performed by: INTERNAL MEDICINE

## 2025-06-23 PROCEDURE — 85025 COMPLETE CBC W/AUTO DIFF WBC: CPT

## 2025-06-23 PROCEDURE — 1123F ACP DISCUSS/DSCN MKR DOCD: CPT | Performed by: INTERNAL MEDICINE

## 2025-06-23 NOTE — PROGRESS NOTES
[facility] [place] [facility]  [facility]  [address]  [address]  [place] [id number]  Dept: [phone]  Loc: [phone]   Hematology/Oncology Progress Note (Clinic)        Bruce Yap  1940  No ref. provider found   Sergio Mosquera MD     Diagnosis/CC:   Follow up on Plasmacytoma    HPI:    Patient is coming today for 6 months follow-up with his wife.  He has history of plasmacytoma diagnosed 2021 status postradiation.  History of lung nodule under surveillance.  History of anemia related to chronic kidney disease.  He has been in remission without any evidence of progression.  Routine labs performed on May 23, 2024 came back stable and no evidence of progression.  Kidney function stable anemia is a stable he has history of lung nodule and CAT scan of the chest performed on May 23, 2024 and showed a stable lung nodule.  In the left upper lobe measures 14 x 17 mm.  No new lung nodules or chest adenopathy.  Bony structures of the chest within normal limits.     He has been stable.  He reported no concerns or pain in regard to growing masses in his neck or elsewhere in his body.  No peripheral lymphadenopathy.  No drenching night sweats.  No loss of weight or loss of appetite.  He denied any chest pain cough or hemoptysis.  He has minimal peripheral neuropathy in the hands.  He denies any headache dizziness or blurring of vision.    He has generalized arthralgias.  Peripheral neuropathy.        Oncology History :      DIAGNOSIS:  -Extramedullary plasmacytoma left neck (biopsy and t path at [facility]) 8/24/2021.  Bone marrow biopsy-uninvolved.     IgA 259/normal [id number]/minimally elevated, IgM 93/normal  Beta-2 microglobulin 3.1/mildly elevated  Serum immunofixation shows normal pattern with no monoclonal proteins.  Serum free kappa light chains 84.82, lambda 37.03, ratio elevated at 2.29 (0.26-1.65)     -Abnormal PET scan (10/7/2021):  with multiple FDG avid mediastinal  Gen: AAOX3, NAD  Head: NCAT  ENT: Airway patent, moist mucous membranes, nasal passageways clear, eyes PERRLA, eomi, no nystagmus  Cardiac: Normal rate, normal rhythm, no murmurs  Respiratory: Lungs CTA B/L  Gastrointestinal: Abdomen soft, nontender, nondistended, no rebound, no guarding  MSK: No gross abnormalities, FROM of all four extremities, no edema  HEME: Extremities warm, pulses intact and symmetrical in upper extremities  Skin: No rashes, no lesions  Neuro: No gross neurologic deficits, CN II-XII intact, no facial asymmetry, no dysmetria, no drift, strength equal in all four extremities, no gait abnormality, no sensory deficits, no tongue fasciculations, no distal extremity tremors

## 2025-06-24 LAB
FREE KAPPA/LAMBDA RATIO: 1.45 (ref 0.22–1.74)
KAPPA LC FREE SER-MCNC: 41.2 MG/L
LAMBDA LC FREE SERPL-MCNC: 28.5 MG/L (ref 4.2–27.7)

## 2025-06-28 LAB
ALBUMIN SERPL ELPH-MCNC: 4.07 G/DL (ref 3.75–5.01)
ALPHA1 GLOB SERPL ELPH-MCNC: 0.36 G/DL (ref 0.19–0.46)
ALPHA2 GLOB SERPL ELPH-MCNC: 0.85 G/DL (ref 0.48–1.05)
B-GLOBULIN SERPL ELPH-MCNC: 0.81 G/DL (ref 0.48–1.1)
GAMMA GLOB SERPL ELPH-MCNC: 1.01 G/DL (ref 0.62–1.51)
INTERPRETATION SERPL IFE-IMP: NORMAL
M PROTEIN SERPL ELPH-MCNC: NORMAL G/DL
MONOCLON BAND OBS SERPL: NORMAL
PATHOLOGY STUDY: NORMAL
PROT SERPL-MCNC: 7.1 G/DL (ref 6.3–8.2)

## 2025-08-09 DIAGNOSIS — E78.5 DYSLIPIDEMIA (HIGH LDL; LOW HDL): ICD-10-CM

## 2025-08-09 DIAGNOSIS — I20.0 UNSTABLE ANGINA (HCC): ICD-10-CM

## 2025-08-11 RX ORDER — CLOPIDOGREL BISULFATE 75 MG/1
75 TABLET ORAL DAILY
Qty: 90 TABLET | Refills: 0 | Status: SHIPPED | OUTPATIENT
Start: 2025-08-11

## 2025-08-11 RX ORDER — METOPROLOL SUCCINATE 50 MG/1
50 TABLET, EXTENDED RELEASE ORAL DAILY
Qty: 90 TABLET | Refills: 0 | Status: SHIPPED | OUTPATIENT
Start: 2025-08-11

## 2025-08-11 RX ORDER — ATORVASTATIN CALCIUM 40 MG/1
40 TABLET, FILM COATED ORAL NIGHTLY
Qty: 90 TABLET | Refills: 0 | Status: SHIPPED | OUTPATIENT
Start: 2025-08-11

## 2025-08-11 RX ORDER — ISOSORBIDE MONONITRATE 60 MG/1
60 TABLET, EXTENDED RELEASE ORAL DAILY
Qty: 90 TABLET | Refills: 0 | Status: SHIPPED | OUTPATIENT
Start: 2025-08-11

## 2025-08-26 ENCOUNTER — OFFICE VISIT (OUTPATIENT)
Dept: CARDIOLOGY CLINIC | Age: 85
End: 2025-08-26
Payer: MEDICARE

## 2025-08-26 VITALS
BODY MASS INDEX: 27.63 KG/M2 | HEIGHT: 70 IN | DIASTOLIC BLOOD PRESSURE: 60 MMHG | HEART RATE: 70 BPM | SYSTOLIC BLOOD PRESSURE: 114 MMHG | WEIGHT: 193 LBS

## 2025-08-26 DIAGNOSIS — I10 PRIMARY HYPERTENSION: ICD-10-CM

## 2025-08-26 DIAGNOSIS — I25.10 CORONARY ARTERY DISEASE INVOLVING NATIVE CORONARY ARTERY OF NATIVE HEART WITHOUT ANGINA PECTORIS: ICD-10-CM

## 2025-08-26 DIAGNOSIS — E78.5 DYSLIPIDEMIA (HIGH LDL; LOW HDL): Primary | ICD-10-CM

## 2025-08-26 DIAGNOSIS — Z95.820 S/P ANGIOPLASTY WITH STENT: ICD-10-CM

## 2025-08-26 PROCEDURE — 3074F SYST BP LT 130 MM HG: CPT | Performed by: PHYSICIAN ASSISTANT

## 2025-08-26 PROCEDURE — G8427 DOCREV CUR MEDS BY ELIG CLIN: HCPCS | Performed by: PHYSICIAN ASSISTANT

## 2025-08-26 PROCEDURE — 93000 ELECTROCARDIOGRAM COMPLETE: CPT | Performed by: PHYSICIAN ASSISTANT

## 2025-08-26 PROCEDURE — 1036F TOBACCO NON-USER: CPT | Performed by: PHYSICIAN ASSISTANT

## 2025-08-26 PROCEDURE — G8417 CALC BMI ABV UP PARAM F/U: HCPCS | Performed by: PHYSICIAN ASSISTANT

## 2025-08-26 PROCEDURE — 99214 OFFICE O/P EST MOD 30 MIN: CPT | Performed by: PHYSICIAN ASSISTANT

## 2025-08-26 PROCEDURE — 3078F DIAST BP <80 MM HG: CPT | Performed by: PHYSICIAN ASSISTANT

## 2025-08-26 PROCEDURE — 1159F MED LIST DOCD IN RCRD: CPT | Performed by: PHYSICIAN ASSISTANT

## 2025-08-26 PROCEDURE — 1123F ACP DISCUSS/DSCN MKR DOCD: CPT | Performed by: PHYSICIAN ASSISTANT

## 2025-08-26 RX ORDER — ISOSORBIDE MONONITRATE 60 MG/1
60 TABLET, EXTENDED RELEASE ORAL DAILY
Qty: 90 TABLET | Refills: 3 | Status: SHIPPED | OUTPATIENT
Start: 2025-08-26

## 2025-08-26 RX ORDER — METOPROLOL SUCCINATE 50 MG/1
50 TABLET, EXTENDED RELEASE ORAL DAILY
Qty: 90 TABLET | Refills: 3 | Status: SHIPPED | OUTPATIENT
Start: 2025-08-26

## 2025-08-26 RX ORDER — ATORVASTATIN CALCIUM 40 MG/1
40 TABLET, FILM COATED ORAL NIGHTLY
Qty: 90 TABLET | Refills: 3 | Status: SHIPPED | OUTPATIENT
Start: 2025-08-26

## 2025-08-26 RX ORDER — CLOPIDOGREL BISULFATE 75 MG/1
75 TABLET ORAL DAILY
Qty: 90 TABLET | Refills: 3 | Status: SHIPPED | OUTPATIENT
Start: 2025-08-26

## (undated) DEVICE — NEEDLE ASPIR 21GA L700MM US GUID TREAT DST END FOR EFFICIENT